# Patient Record
Sex: FEMALE | Race: WHITE | NOT HISPANIC OR LATINO | Employment: OTHER | URBAN - METROPOLITAN AREA
[De-identification: names, ages, dates, MRNs, and addresses within clinical notes are randomized per-mention and may not be internally consistent; named-entity substitution may affect disease eponyms.]

---

## 2017-01-03 ENCOUNTER — PATIENT MESSAGE (OUTPATIENT)
Dept: RHEUMATOLOGY | Facility: CLINIC | Age: 47
End: 2017-01-03

## 2017-01-09 ENCOUNTER — PATIENT MESSAGE (OUTPATIENT)
Dept: RHEUMATOLOGY | Facility: CLINIC | Age: 47
End: 2017-01-09

## 2017-02-21 ENCOUNTER — PATIENT MESSAGE (OUTPATIENT)
Dept: RHEUMATOLOGY | Facility: CLINIC | Age: 47
End: 2017-02-21

## 2017-02-21 DIAGNOSIS — M06.021 RHEUMATOID ARTHRITIS INVOLVING BOTH ELBOWS WITH NEGATIVE RHEUMATOID FACTOR: ICD-10-CM

## 2017-02-21 DIAGNOSIS — M47.817 LUMBAR AND SACRAL OSTEOARTHRITIS: ICD-10-CM

## 2017-02-21 DIAGNOSIS — M06.022 RHEUMATOID ARTHRITIS INVOLVING BOTH ELBOWS WITH NEGATIVE RHEUMATOID FACTOR: ICD-10-CM

## 2017-02-28 RX ORDER — HYDROCODONE BITARTRATE AND ACETAMINOPHEN 7.5; 325 MG/1; MG/1
1 TABLET ORAL EVERY 8 HOURS PRN
Qty: 90 TABLET | Refills: 0 | Status: SHIPPED | OUTPATIENT
Start: 2017-02-28 | End: 2017-03-03 | Stop reason: SDUPTHER

## 2017-03-01 NOTE — TELEPHONE ENCOUNTER
. I have  check [place] prescription monitoring program site and no unusual or abnormal behavior has occured

## 2017-03-03 ENCOUNTER — OFFICE VISIT (OUTPATIENT)
Dept: RHEUMATOLOGY | Facility: CLINIC | Age: 47
End: 2017-03-03
Payer: COMMERCIAL

## 2017-03-03 ENCOUNTER — PATIENT MESSAGE (OUTPATIENT)
Dept: RHEUMATOLOGY | Facility: CLINIC | Age: 47
End: 2017-03-03

## 2017-03-03 VITALS
BODY MASS INDEX: 27.04 KG/M2 | DIASTOLIC BLOOD PRESSURE: 87 MMHG | WEIGHT: 138.44 LBS | HEART RATE: 74 BPM | SYSTOLIC BLOOD PRESSURE: 128 MMHG

## 2017-03-03 DIAGNOSIS — M06.022 RHEUMATOID ARTHRITIS INVOLVING BOTH ELBOWS WITH NEGATIVE RHEUMATOID FACTOR: Primary | ICD-10-CM

## 2017-03-03 DIAGNOSIS — M47.817 LUMBAR AND SACRAL OSTEOARTHRITIS: ICD-10-CM

## 2017-03-03 DIAGNOSIS — L40.50 PSORIATIC ARTHRITIS: ICD-10-CM

## 2017-03-03 DIAGNOSIS — L40.8 PSORIASIS WITH PUSTULES: ICD-10-CM

## 2017-03-03 DIAGNOSIS — N20.0 KIDNEY STONES: ICD-10-CM

## 2017-03-03 DIAGNOSIS — M06.021 RHEUMATOID ARTHRITIS INVOLVING BOTH ELBOWS WITH NEGATIVE RHEUMATOID FACTOR: Primary | ICD-10-CM

## 2017-03-03 PROCEDURE — 99999 PR PBB SHADOW E&M-EST. PATIENT-LVL III: CPT | Mod: PBBFAC,,, | Performed by: INTERNAL MEDICINE

## 2017-03-03 PROCEDURE — 1160F RVW MEDS BY RX/DR IN RCRD: CPT | Mod: S$GLB,,, | Performed by: INTERNAL MEDICINE

## 2017-03-03 PROCEDURE — 99214 OFFICE O/P EST MOD 30 MIN: CPT | Mod: 25,S$GLB,, | Performed by: INTERNAL MEDICINE

## 2017-03-03 PROCEDURE — 96372 THER/PROPH/DIAG INJ SC/IM: CPT | Mod: S$GLB,,, | Performed by: INTERNAL MEDICINE

## 2017-03-03 RX ORDER — HYDROCODONE BITARTRATE AND ACETAMINOPHEN 7.5; 325 MG/1; MG/1
1 TABLET ORAL EVERY 8 HOURS PRN
Qty: 90 TABLET | Refills: 0 | Status: SHIPPED | OUTPATIENT
Start: 2017-03-24 | End: 2017-04-26 | Stop reason: SDUPTHER

## 2017-03-03 RX ORDER — METHYLPREDNISOLONE ACETATE 80 MG/ML
160 INJECTION, SUSPENSION INTRA-ARTICULAR; INTRALESIONAL; INTRAMUSCULAR; SOFT TISSUE
Status: COMPLETED | OUTPATIENT
Start: 2017-03-03 | End: 2017-03-03

## 2017-03-03 RX ORDER — DEXMETHYLPHENIDATE HYDROCHLORIDE 5 MG/1
TABLET ORAL
Refills: 0 | COMMUNITY
Start: 2017-01-18 | End: 2017-07-14

## 2017-03-03 RX ORDER — TRIAMCINOLONE ACETONIDE 0.25 MG/G
CREAM TOPICAL 2 TIMES DAILY
Qty: 80 G | Refills: 5 | Status: SHIPPED | OUTPATIENT
Start: 2017-03-03 | End: 2017-11-06 | Stop reason: SDUPTHER

## 2017-03-03 RX ORDER — LINACLOTIDE 145 UG/1
CAPSULE, GELATIN COATED ORAL
Refills: 11 | COMMUNITY
Start: 2016-12-29 | End: 2018-11-01

## 2017-03-03 RX ORDER — FLUOXETINE HYDROCHLORIDE 20 MG/1
CAPSULE ORAL
Refills: 2 | COMMUNITY
Start: 2017-02-02 | End: 2017-07-14

## 2017-03-03 RX ORDER — CYANOCOBALAMIN 1000 UG/ML
1000 INJECTION, SOLUTION INTRAMUSCULAR; SUBCUTANEOUS
Status: COMPLETED | OUTPATIENT
Start: 2017-03-03 | End: 2017-03-03

## 2017-03-03 RX ORDER — POTASSIUM CITRATE 10 MEQ/1
20 TABLET, EXTENDED RELEASE ORAL 2 TIMES DAILY WITH MEALS
Qty: 120 TABLET | Refills: 5 | Status: SHIPPED | OUTPATIENT
Start: 2017-03-03 | End: 2018-11-01

## 2017-03-03 RX ORDER — KETOROLAC TROMETHAMINE 30 MG/ML
60 INJECTION, SOLUTION INTRAMUSCULAR; INTRAVENOUS
Status: COMPLETED | OUTPATIENT
Start: 2017-03-03 | End: 2017-03-03

## 2017-03-03 RX ADMIN — CYANOCOBALAMIN 1000 MCG: 1000 INJECTION, SOLUTION INTRAMUSCULAR; SUBCUTANEOUS at 05:03

## 2017-03-03 RX ADMIN — METHYLPREDNISOLONE ACETATE 160 MG: 80 INJECTION, SUSPENSION INTRA-ARTICULAR; INTRALESIONAL; INTRAMUSCULAR; SOFT TISSUE at 05:03

## 2017-03-03 RX ADMIN — KETOROLAC TROMETHAMINE 60 MG: 30 INJECTION, SOLUTION INTRAMUSCULAR; INTRAVENOUS at 05:03

## 2017-03-03 ASSESSMENT — ROUTINE ASSESSMENT OF PATIENT INDEX DATA (RAPID3)
AM STIFFNESS SCORE: 1, YES
FATIGUE SCORE: 5
PSYCHOLOGICAL DISTRESS SCORE: 4.4
WHEN YOU AWAKENED IN THE MORNING OVER THE LAST WEEK, PLEASE INDICATE THE AMOUNT OF TIME IT TAKES UNTIL YOU ARE AS LIMBER AS YOU WILL BE FOR THE DAY: ONE HOUR AFTER WAKING
PATIENT GLOBAL ASSESSMENT SCORE: 5
MDHAQ FUNCTION SCORE: 1.5
TOTAL RAPID3 SCORE: 5.5
PAIN SCORE: 6.5

## 2017-03-03 NOTE — PROGRESS NOTES
Subjective:       Patient ID: Osiris Billingsley is a 46 y.o. female.    Chief Complaint: Follow-up    HPI Comments: Follow up: psa and ra, newly dx uveitis.Patient complains of arthralgias and myalgias for which has been present for a few years. Pain is located in multiple joints, both shoulder(s), both elbow(s), both wrist(s), both MCP(s): 1st, 2nd, 3rd, 4th and 5th, both PIP(s): 1st, 2nd, 3rd, 4th and 5th, both DIP(s): 1st and 2nd, both hip(s), both knee(s) and both MTP(s): 1st, 2nd, 3rd, 4th and 5th, is described as aching, pulsating, shooting and throbbing, and is constant, moderate .  Associated symptoms include: crepitation, decreased range of motion, edema, effusion, tenderness and warmth.  Uveitis flare              She complains of lasting between 1 and 2 hours after awakening.    Past treatments include methotrexate (plaquenil, azulfidine, humira).           She complains of lasting between 1 and 2 hours after awakening.              She complains of lasting between 1 and 2 hours after awakening.        Review of Systems   Constitutional: Positive for activity change. Negative for appetite change, chills, diaphoresis and unexpected weight change.   HENT: Negative for congestion, dental problem, ear discharge, ear pain, facial swelling, mouth sores, nosebleeds, postnasal drip, rhinorrhea, sinus pressure, sneezing, sore throat, tinnitus and voice change.    Eyes: Positive for pain and redness. Negative for photophobia, discharge and itching.   Respiratory: Negative for apnea, cough, chest tightness, shortness of breath and wheezing.    Cardiovascular: Positive for leg swelling. Negative for chest pain and palpitations.   Gastrointestinal: Negative for abdominal distention, abdominal pain, constipation, diarrhea, nausea and vomiting.   Endocrine: Negative for cold intolerance, heat intolerance, polydipsia and polyuria.   Genitourinary: Negative for decreased urine volume, difficulty urinating, flank pain,  frequency, hematuria and urgency.   Musculoskeletal: Positive for arthralgias, back pain, gait problem, neck pain and neck stiffness.   Skin: Negative for pallor, rash and wound.   Allergic/Immunologic: Negative for immunocompromised state.   Neurological: Positive for weakness. Negative for dizziness, tremors and numbness.   Hematological: Negative for adenopathy. Does not bruise/bleed easily.   Psychiatric/Behavioral: Negative for sleep disturbance. The patient is not nervous/anxious.          Objective:     /87 (BP Location: Left arm, Patient Position: Sitting, BP Method: Automatic)  Pulse 74  Wt 62.8 kg (138 lb 7.2 oz)  BMI 27.04 kg/m2     Physical Exam   Vitals reviewed.  Constitutional: She is oriented to person, place, and time and well-developed, well-nourished, and in no distress.   HENT:   Head: Normocephalic and atraumatic.   Mouth/Throat: Oropharynx is clear and moist.   Eyes: EOM are normal. Pupils are equal, round, and reactive to light.   Neck: Neck supple. No thyromegaly present.   Cardiovascular: Normal rate, regular rhythm and normal heart sounds.  Exam reveals no gallop and no friction rub.    No murmur heard.  Pulmonary/Chest: Breath sounds normal. She has no wheezes. She has no rales. She exhibits no tenderness.   Abdominal: There is no tenderness. There is no rebound and no guarding.       Right Side Rheumatological Exam     Examination finds the elbow, 1st MCP, 2nd MCP, 3rd MCP, 4th MCP and 5th MCP normal.    The patient is tender to palpation of the shoulder, wrist, knee, 1st PIP, 1st MCP, 2nd PIP, 2nd MCP, 3rd PIP, 3rd MCP, 4th PIP, 4th MCP, 5th PIP and 5th MCP    She has swelling of the knee    The patient has an enlarged wrist, 1st PIP, 2nd PIP, 3rd PIP, 4th PIP and 5th PIP    Shoulder Exam   Tenderness Location: acromion    Range of Motion   Active Abduction: abnormal   Adduction: abnormal  Effusion: negative  Sensation: normal    Knee Exam   Tenderness Location: medial joint  line  Patellofemoral Crepitus: positive  Effusion: positive  Range of Motion: decreased range of motion  Sensation: normal    Hip Exam   Tenderness Location: no tenderness  Sensation: normal    Elbow/Wrist Exam   Tenderness Location: no tenderness  Sensation: normal    Left Side Rheumatological Exam     Examination finds the elbow, knee, 1st MCP, 2nd MCP, 3rd MCP, 4th MCP and 5th MCP normal.    The patient is tender to palpation of the shoulder, elbow, wrist, knee, 1st PIP, 1st MCP, 2nd PIP, 2nd MCP, 3rd PIP, 3rd MCP, 4th PIP, 4th MCP, 5th PIP and 5th MCP.    The patient has an enlarged wrist, 1st PIP, 2nd PIP, 3rd PIP, 4th PIP and 5th PIP.    Shoulder Exam   Tenderness Location: acromion    Range of Motion   Active Abduction: abnormal   Sensation: normal    Knee Exam   Tenderness Location: lateral joint line and medial joint line    Patellofemoral Crepitus: positive  Effusion: positive  Range of Motion: decreased range of motion  Sensation: normal    Hip Exam   Tenderness Location: no tenderness  Sensation: normal    Elbow/Wrist Exam   Sensation: normal      Back/Neck Exam   General Inspection   Gait: normal       Tenderness Right paramedian tenderness of the Lower C-Spine and Lower L-Spine.Left paramedian tenderness of the Upper C-Spine and Lower L-Spine.      Lymphadenopathy:     She has no cervical adenopathy.   Neurological: She is alert and oriented to person, place, and time. Gait normal.   Reflex Scores:       Patellar reflexes are 3+ on the right side and 3+ on the left side.  Skin: Rash noted. No erythema. There is pallor.     Psoriasis B knee   Psychiatric: Mood, memory, affect and judgment normal.   Musculoskeletal: She exhibits edema, tenderness and deformity.           Results for orders placed or performed in visit on 04/05/16   LUTEINIZING HORMONE   Result Value Ref Range    LH 37.4 See Text mIU/mL   FOLLICLE STIMULATING HORMONE   Result Value Ref Range    FSH 87.10 See Text mIU/mL   Estrogens, total    Result Value Ref Range    Estrogen 41 pg/mL   PROGESTERONE   Result Value Ref Range    Progesterone <0.1 See Text ng/mL   Protein electrophoresis, serum   Result Value Ref Range    Protein, Serum 6.8 6.0 - 8.4 g/dL    Albumin grams/dl 4.28 3.35 - 5.55 g/dL    Alpha-1 grams/dl 0.22 0.17 - 0.41 g/dL    Alpha-2 grams/dl 0.58 0.43 - 0.99 g/dL    Beta grams/dl 0.65 0.50 - 1.10 g/dL    Gamma grams/dl 1.07 0.67 - 1.58 g/dL   PTH, intact   Result Value Ref Range    PTH, Intact 75.0 9.0 - 77.0 pg/mL   CBC W/ AUTO DIFFERENTIAL   Result Value Ref Range    WBC 6.22 3.90 - 12.70 K/uL    RBC 4.37 4.00 - 5.40 M/uL    Hemoglobin 12.9 12.0 - 16.0 g/dL    Hematocrit 38.5 37.0 - 48.5 %    MCV 88 82 - 98 fL    MCH 29.5 27.0 - 31.0 pg    MCHC 33.5 32.0 - 36.0 %    RDW 12.8 11.5 - 14.5 %    Platelets 230 150 - 350 K/uL    MPV 11.6 9.2 - 12.9 fL    Gran # 2.5 1.8 - 7.7 K/uL    Lymph # 3.2 1.0 - 4.8 K/uL    Mono # 0.3 0.3 - 1.0 K/uL    Eos # 0.2 0.0 - 0.5 K/uL    Baso # 0.02 0.00 - 0.20 K/uL    Gran% 39.9 38.0 - 73.0 %    Lymph% 51.9 (H) 18.0 - 48.0 %    Mono% 5.5 4.0 - 15.0 %    Eosinophil% 2.4 0.0 - 8.0 %    Basophil% 0.3 0.0 - 1.9 %    Differential Method Automated    COMPREHENSIVE METABOLIC PANEL   Result Value Ref Range    Sodium 139 136 - 145 mmol/L    Potassium 4.0 3.5 - 5.1 mmol/L    Chloride 104 95 - 110 mmol/L    CO2 25 23 - 29 mmol/L    Glucose 93 70 - 110 mg/dL    BUN, Bld 15 6 - 20 mg/dL    Creatinine 0.9 0.5 - 1.4 mg/dL    Calcium 9.3 8.7 - 10.5 mg/dL    Total Protein 7.0 6.0 - 8.4 g/dL    Albumin 4.0 3.5 - 5.2 g/dL    Total Bilirubin 0.9 0.1 - 1.0 mg/dL    Alkaline Phosphatase 69 55 - 135 U/L    AST 19 10 - 40 U/L    ALT 19 10 - 44 U/L    Anion Gap 10 8 - 16 mmol/L    eGFR if African American >60.0 >60 mL/min/1.73 m^2    eGFR if non African American >60.0 >60 mL/min/1.73 m^2   SEDIMENTATION RATE, MANUAL   Result Value Ref Range    Sed Rate 8 0 - 20 mm/Hr   C-REACTIVE PROTEIN   Result Value Ref Range    CRP 0.5 0.0 - 8.2  mg/L   RHEUMATOID FACTOR   Result Value Ref Range    Rheumatoid Factor <10.0 0.0 - 15.0 IU/mL   CYCLIC CITRUL PEPTIDE ANTIBODY, IGG   Result Value Ref Range    CCP Antibodies <0.5 <5.0 U/mL   Pathologist Interpretation SPE   Result Value Ref Range    Pathologist Interpretation SPE REVIEWED        Assessment:       Encounter Diagnoses   Name Primary?    Rheumatoid arthritis involving both elbows with negative rheumatoid factor Yes    Psoriatic arthritis     Kidney stones     Psoriasis with pustules     Lumbar and sacral osteoarthritis            Plan:       Rheumatoid arthritis involving both elbows with negative rheumatoid factor  -     triamcinolone acetonide 0.025% (KENALOG) 0.025 % cream; Apply topically 2 (two) times daily.  Dispense: 80 g; Refill: 5  -     CBC auto differential; Future; Expected date: 3/3/17  -     Comprehensive metabolic panel; Future; Expected date: 3/3/17  -     TSH; Future; Expected date: 3/3/17  -     T4, free; Future; Expected date: 3/3/17  -     T3, free; Future; Expected date: 3/3/17  -     C-reactive protein; Future; Expected date: 3/3/17  -     Sedimentation rate, manual; Future; Expected date: 3/3/17  -     hydrocodone-acetaminophen 7.5-325mg (NORCO) 7.5-325 mg per tablet; Take 1 tablet by mouth every 8 (eight) hours as needed for Pain.  Dispense: 90 tablet; Refill: 0    Psoriatic arthritis  -     triamcinolone acetonide 0.025% (KENALOG) 0.025 % cream; Apply topically 2 (two) times daily.  Dispense: 80 g; Refill: 5  -     CBC auto differential; Future; Expected date: 3/3/17  -     Comprehensive metabolic panel; Future; Expected date: 3/3/17  -     TSH; Future; Expected date: 3/3/17  -     T4, free; Future; Expected date: 3/3/17  -     T3, free; Future; Expected date: 3/3/17  -     C-reactive protein; Future; Expected date: 3/3/17  -     Sedimentation rate, manual; Future; Expected date: 3/3/17  -     hydrocodone-acetaminophen 7.5-325mg (NORCO) 7.5-325 mg per tablet; Take 1  tablet by mouth every 8 (eight) hours as needed for Pain.  Dispense: 90 tablet; Refill: 0    Kidney stones  -     triamcinolone acetonide 0.025% (KENALOG) 0.025 % cream; Apply topically 2 (two) times daily.  Dispense: 80 g; Refill: 5  -     CBC auto differential; Future; Expected date: 3/3/17  -     Comprehensive metabolic panel; Future; Expected date: 3/3/17  -     TSH; Future; Expected date: 3/3/17  -     T4, free; Future; Expected date: 3/3/17  -     T3, free; Future; Expected date: 3/3/17  -     C-reactive protein; Future; Expected date: 3/3/17  -     Sedimentation rate, manual; Future; Expected date: 3/3/17  -     hydrocodone-acetaminophen 7.5-325mg (NORCO) 7.5-325 mg per tablet; Take 1 tablet by mouth every 8 (eight) hours as needed for Pain.  Dispense: 90 tablet; Refill: 0    Psoriasis with pustules  -     triamcinolone acetonide 0.025% (KENALOG) 0.025 % cream; Apply topically 2 (two) times daily.  Dispense: 80 g; Refill: 5  -     CBC auto differential; Future; Expected date: 3/3/17  -     Comprehensive metabolic panel; Future; Expected date: 3/3/17  -     TSH; Future; Expected date: 3/3/17  -     T4, free; Future; Expected date: 3/3/17  -     T3, free; Future; Expected date: 3/3/17  -     C-reactive protein; Future; Expected date: 3/3/17  -     Sedimentation rate, manual; Future; Expected date: 3/3/17  -     hydrocodone-acetaminophen 7.5-325mg (NORCO) 7.5-325 mg per tablet; Take 1 tablet by mouth every 8 (eight) hours as needed for Pain.  Dispense: 90 tablet; Refill: 0    Lumbar and sacral osteoarthritis  -     CBC auto differential; Future; Expected date: 3/3/17  -     Comprehensive metabolic panel; Future; Expected date: 3/3/17  -     TSH; Future; Expected date: 3/3/17  -     T4, free; Future; Expected date: 3/3/17  -     T3, free; Future; Expected date: 3/3/17  -     C-reactive protein; Future; Expected date: 3/3/17  -     Sedimentation rate, manual; Future; Expected date: 3/3/17  -      hydrocodone-acetaminophen 7.5-325mg (NORCO) 7.5-325 mg per tablet; Take 1 tablet by mouth every 8 (eight) hours as needed for Pain.  Dispense: 90 tablet; Refill: 0    Other orders  -     potassium citrate (UROCIT-K) 10 mEq (1,080 mg) TbSR; Take 2 tablets (20 mEq total) by mouth 2 (two) times daily with meals.  Dispense: 120 tablet; Refill: 5

## 2017-03-03 NOTE — PROGRESS NOTES
Administered 1 cc ( 1000 mcg/ml ) of b 12 to the right upper outer gluteal. Informed of s/s to report verbalized understanding. No adverse reactions noted.    Lot # [id number]  Expiration jul 18    Administered 2 cc ( 80 mg/ml ) of depomedrol to the right upper outer gluteal. Informed of s/s to report verbalized understanding. No adverse reactions noted.    Lot # [id number]  Expiration 01/2018    Administered 2 cc ( 30 mg/ml ) of toradol to the left upper outer gluteal. Informed of s/s to report verbalized understanding. No adverse reactions noted.    Lot # [id number]  Expiration 1 sep 2017

## 2017-03-07 ENCOUNTER — HOSPITAL ENCOUNTER (OUTPATIENT)
Dept: RADIOLOGY | Facility: CLINIC | Age: 47
Discharge: HOME | End: 2017-03-07
Attending: INTERNAL MEDICINE
Payer: COMMERCIAL

## 2017-03-07 DIAGNOSIS — M06.021 RHEUMATOID ARTHRITIS INVOLVING BOTH ELBOWS WITH NEGATIVE RHEUMATOID FACTOR: ICD-10-CM

## 2017-03-07 DIAGNOSIS — M06.022 RHEUMATOID ARTHRITIS INVOLVING BOTH ELBOWS WITH NEGATIVE RHEUMATOID FACTOR: ICD-10-CM

## 2017-03-07 DIAGNOSIS — N20.0 KIDNEY STONES: ICD-10-CM

## 2017-03-07 DIAGNOSIS — L40.50 PSORIATIC ARTHRITIS: ICD-10-CM

## 2017-03-07 DIAGNOSIS — M47.817 LUMBAR AND SACRAL OSTEOARTHRITIS: ICD-10-CM

## 2017-03-07 DIAGNOSIS — L40.8 PSORIASIS WITH PUSTULES: ICD-10-CM

## 2017-03-07 PROCEDURE — 77080 DXA BONE DENSITY AXIAL: CPT | Mod: TC,PO

## 2017-03-07 PROCEDURE — 77080 DXA BONE DENSITY AXIAL: CPT | Mod: 26,,, | Performed by: RADIOLOGY

## 2017-04-25 ENCOUNTER — PATIENT MESSAGE (OUTPATIENT)
Dept: RHEUMATOLOGY | Facility: CLINIC | Age: 47
End: 2017-04-25

## 2017-04-25 DIAGNOSIS — L40.50 PSORIATIC ARTHRITIS: ICD-10-CM

## 2017-04-25 DIAGNOSIS — N20.0 KIDNEY STONES: ICD-10-CM

## 2017-04-25 DIAGNOSIS — M47.817 LUMBAR AND SACRAL OSTEOARTHRITIS: ICD-10-CM

## 2017-04-25 DIAGNOSIS — M06.021 RHEUMATOID ARTHRITIS INVOLVING BOTH ELBOWS WITH NEGATIVE RHEUMATOID FACTOR: ICD-10-CM

## 2017-04-25 DIAGNOSIS — M06.022 RHEUMATOID ARTHRITIS INVOLVING BOTH ELBOWS WITH NEGATIVE RHEUMATOID FACTOR: ICD-10-CM

## 2017-04-25 DIAGNOSIS — L40.8 PSORIASIS WITH PUSTULES: ICD-10-CM

## 2017-04-27 RX ORDER — HYDROCODONE BITARTRATE AND ACETAMINOPHEN 7.5; 325 MG/1; MG/1
1 TABLET ORAL EVERY 8 HOURS PRN
Qty: 90 TABLET | Refills: 0 | Status: SHIPPED | OUTPATIENT
Start: 2017-04-27 | End: 2017-06-08 | Stop reason: SDUPTHER

## 2017-05-09 ENCOUNTER — PATIENT MESSAGE (OUTPATIENT)
Dept: RHEUMATOLOGY | Facility: CLINIC | Age: 47
End: 2017-05-09

## 2017-06-08 DIAGNOSIS — L40.8 PSORIASIS WITH PUSTULES: ICD-10-CM

## 2017-06-08 DIAGNOSIS — M06.021 RHEUMATOID ARTHRITIS INVOLVING BOTH ELBOWS WITH NEGATIVE RHEUMATOID FACTOR: ICD-10-CM

## 2017-06-08 DIAGNOSIS — M47.817 LUMBAR AND SACRAL OSTEOARTHRITIS: ICD-10-CM

## 2017-06-08 DIAGNOSIS — M06.022 RHEUMATOID ARTHRITIS INVOLVING BOTH ELBOWS WITH NEGATIVE RHEUMATOID FACTOR: ICD-10-CM

## 2017-06-08 DIAGNOSIS — N20.0 KIDNEY STONES: ICD-10-CM

## 2017-06-08 DIAGNOSIS — L40.50 PSORIATIC ARTHRITIS: ICD-10-CM

## 2017-06-09 RX ORDER — HYDROCODONE BITARTRATE AND ACETAMINOPHEN 7.5; 325 MG/1; MG/1
1 TABLET ORAL EVERY 8 HOURS PRN
Qty: 90 TABLET | Refills: 0 | Status: SHIPPED | OUTPATIENT
Start: 2017-06-09 | End: 2017-07-14 | Stop reason: SDUPTHER

## 2017-06-11 DIAGNOSIS — M06.9 RHEUMATOID ARTHRITIS: ICD-10-CM

## 2017-06-11 DIAGNOSIS — M47.817 LUMBAR AND SACRAL OSTEOARTHRITIS: ICD-10-CM

## 2017-06-13 RX ORDER — TRAZODONE HYDROCHLORIDE 100 MG/1
TABLET ORAL
Qty: 30 TABLET | Refills: 3 | Status: SHIPPED | OUTPATIENT
Start: 2017-06-13 | End: 2017-10-22 | Stop reason: SDUPTHER

## 2017-07-06 ENCOUNTER — PATIENT MESSAGE (OUTPATIENT)
Dept: RHEUMATOLOGY | Facility: CLINIC | Age: 47
End: 2017-07-06

## 2017-07-10 ENCOUNTER — PATIENT MESSAGE (OUTPATIENT)
Dept: RHEUMATOLOGY | Facility: CLINIC | Age: 47
End: 2017-07-10

## 2017-07-14 ENCOUNTER — OFFICE VISIT (OUTPATIENT)
Dept: RHEUMATOLOGY | Facility: CLINIC | Age: 47
End: 2017-07-14
Payer: COMMERCIAL

## 2017-07-14 ENCOUNTER — LAB VISIT (OUTPATIENT)
Dept: LAB | Facility: HOSPITAL | Age: 47
End: 2017-07-14
Attending: INTERNAL MEDICINE
Payer: COMMERCIAL

## 2017-07-14 VITALS
DIASTOLIC BLOOD PRESSURE: 80 MMHG | WEIGHT: 140.31 LBS | HEART RATE: 90 BPM | BODY MASS INDEX: 27.4 KG/M2 | SYSTOLIC BLOOD PRESSURE: 135 MMHG

## 2017-07-14 DIAGNOSIS — M47.817 LUMBAR AND SACRAL OSTEOARTHRITIS: ICD-10-CM

## 2017-07-14 DIAGNOSIS — L40.8 PSORIASIS WITH PUSTULES: ICD-10-CM

## 2017-07-14 DIAGNOSIS — F32.A DEPRESSION, UNSPECIFIED DEPRESSION TYPE: ICD-10-CM

## 2017-07-14 DIAGNOSIS — M47.817 LUMBAR AND SACRAL OSTEOARTHRITIS: Primary | ICD-10-CM

## 2017-07-14 DIAGNOSIS — N20.0 KIDNEY STONES: ICD-10-CM

## 2017-07-14 DIAGNOSIS — M79.7 FIBROMYALGIA: ICD-10-CM

## 2017-07-14 DIAGNOSIS — L40.50 PSORIATIC ARTHRITIS: ICD-10-CM

## 2017-07-14 LAB
ALBUMIN SERPL BCP-MCNC: 3.9 G/DL
ALP SERPL-CCNC: 63 U/L
ALT SERPL W/O P-5'-P-CCNC: 40 U/L
ANION GAP SERPL CALC-SCNC: 13 MMOL/L
AST SERPL-CCNC: 30 U/L
BASOPHILS # BLD AUTO: 0.02 K/UL
BASOPHILS NFR BLD: 0.3 %
BILIRUB SERPL-MCNC: 0.5 MG/DL
BUN SERPL-MCNC: 16 MG/DL
CALCIUM SERPL-MCNC: 9.5 MG/DL
CHLORIDE SERPL-SCNC: 103 MMOL/L
CO2 SERPL-SCNC: 25 MMOL/L
CREAT SERPL-MCNC: 0.9 MG/DL
CRP SERPL-MCNC: 1.2 MG/L
DIFFERENTIAL METHOD: NORMAL
EOSINOPHIL # BLD AUTO: 0.1 K/UL
EOSINOPHIL NFR BLD: 1.5 %
ERYTHROCYTE [DISTWIDTH] IN BLOOD BY AUTOMATED COUNT: 12.9 %
ERYTHROCYTE [SEDIMENTATION RATE] IN BLOOD BY WESTERGREN METHOD: 16 MM/HR
EST. GFR  (AFRICAN AMERICAN): >60 ML/MIN/1.73 M^2
EST. GFR  (NON AFRICAN AMERICAN): >60 ML/MIN/1.73 M^2
GLUCOSE SERPL-MCNC: 96 MG/DL
HCT VFR BLD AUTO: 38.4 %
HGB BLD-MCNC: 12.7 G/DL
LYMPHOCYTES # BLD AUTO: 3 K/UL
LYMPHOCYTES NFR BLD: 41.5 %
MCH RBC QN AUTO: 30 PG
MCHC RBC AUTO-ENTMCNC: 33.1 %
MCV RBC AUTO: 91 FL
MONOCYTES # BLD AUTO: 0.5 K/UL
MONOCYTES NFR BLD: 6.8 %
NEUTROPHILS # BLD AUTO: 3.6 K/UL
NEUTROPHILS NFR BLD: 49.6 %
PLATELET # BLD AUTO: 279 K/UL
PMV BLD AUTO: 11 FL
POTASSIUM SERPL-SCNC: 3.7 MMOL/L
PROT SERPL-MCNC: 7.6 G/DL
RBC # BLD AUTO: 4.23 M/UL
SODIUM SERPL-SCNC: 141 MMOL/L
T3FREE SERPL-MCNC: 2.8 PG/ML
T4 FREE SERPL-MCNC: 0.9 NG/DL
TSH SERPL DL<=0.005 MIU/L-ACNC: 0.91 UIU/ML
WBC # BLD AUTO: 7.25 K/UL

## 2017-07-14 PROCEDURE — 86617 LYME DISEASE ANTIBODY: CPT | Mod: 91

## 2017-07-14 PROCEDURE — 86235 NUCLEAR ANTIGEN ANTIBODY: CPT | Mod: 59

## 2017-07-14 PROCEDURE — 86140 C-REACTIVE PROTEIN: CPT

## 2017-07-14 PROCEDURE — 86376 MICROSOMAL ANTIBODY EACH: CPT

## 2017-07-14 PROCEDURE — 85025 COMPLETE CBC W/AUTO DIFF WBC: CPT

## 2017-07-14 PROCEDURE — 86038 ANTINUCLEAR ANTIBODIES: CPT

## 2017-07-14 PROCEDURE — 96372 THER/PROPH/DIAG INJ SC/IM: CPT | Mod: S$GLB,,, | Performed by: INTERNAL MEDICINE

## 2017-07-14 PROCEDURE — 84443 ASSAY THYROID STIM HORMONE: CPT

## 2017-07-14 PROCEDURE — 86225 DNA ANTIBODY NATIVE: CPT

## 2017-07-14 PROCEDURE — 80053 COMPREHEN METABOLIC PANEL: CPT

## 2017-07-14 PROCEDURE — 86039 ANTINUCLEAR ANTIBODIES (ANA): CPT

## 2017-07-14 PROCEDURE — 84439 ASSAY OF FREE THYROXINE: CPT

## 2017-07-14 PROCEDURE — 86235 NUCLEAR ANTIGEN ANTIBODY: CPT

## 2017-07-14 PROCEDURE — 84481 FREE ASSAY (FT-3): CPT

## 2017-07-14 PROCEDURE — 99999 PR PBB SHADOW E&M-EST. PATIENT-LVL III: CPT | Mod: PBBFAC,,, | Performed by: INTERNAL MEDICINE

## 2017-07-14 PROCEDURE — 85651 RBC SED RATE NONAUTOMATED: CPT

## 2017-07-14 PROCEDURE — 99215 OFFICE O/P EST HI 40 MIN: CPT | Mod: 25,S$GLB,, | Performed by: INTERNAL MEDICINE

## 2017-07-14 RX ORDER — VALACYCLOVIR HYDROCHLORIDE 1 G/1
TABLET, FILM COATED ORAL
Refills: 2 | COMMUNITY
Start: 2017-04-10 | End: 2018-11-01

## 2017-07-14 RX ORDER — CLONAZEPAM 0.5 MG/1
0.5 TABLET ORAL 2 TIMES DAILY
Qty: 60 TABLET | Refills: 2 | Status: SHIPPED | OUTPATIENT
Start: 2017-07-14 | End: 2019-03-08

## 2017-07-14 RX ORDER — METHYLPREDNISOLONE ACETATE 80 MG/ML
160 INJECTION, SUSPENSION INTRA-ARTICULAR; INTRALESIONAL; INTRAMUSCULAR; SOFT TISSUE
Status: COMPLETED | OUTPATIENT
Start: 2017-07-14 | End: 2017-07-14

## 2017-07-14 RX ORDER — KETOROLAC TROMETHAMINE 30 MG/ML
60 INJECTION, SOLUTION INTRAMUSCULAR; INTRAVENOUS
Status: COMPLETED | OUTPATIENT
Start: 2017-07-14 | End: 2017-07-14

## 2017-07-14 RX ORDER — HYDROCODONE BITARTRATE AND ACETAMINOPHEN 7.5; 325 MG/1; MG/1
1 TABLET ORAL EVERY 8 HOURS PRN
Qty: 90 TABLET | Refills: 0 | Status: SHIPPED | OUTPATIENT
Start: 2017-07-14 | End: 2017-09-20 | Stop reason: SDUPTHER

## 2017-07-14 RX ORDER — CYANOCOBALAMIN 1000 UG/ML
1000 INJECTION, SOLUTION INTRAMUSCULAR; SUBCUTANEOUS
Status: COMPLETED | OUTPATIENT
Start: 2017-07-14 | End: 2017-07-14

## 2017-07-14 RX ADMIN — CYANOCOBALAMIN 1000 MCG: 1000 INJECTION, SOLUTION INTRAMUSCULAR; SUBCUTANEOUS at 05:07

## 2017-07-14 RX ADMIN — METHYLPREDNISOLONE ACETATE 160 MG: 80 INJECTION, SUSPENSION INTRA-ARTICULAR; INTRALESIONAL; INTRAMUSCULAR; SOFT TISSUE at 05:07

## 2017-07-14 RX ADMIN — KETOROLAC TROMETHAMINE 60 MG: 30 INJECTION, SOLUTION INTRAMUSCULAR; INTRAVENOUS at 05:07

## 2017-07-14 ASSESSMENT — ROUTINE ASSESSMENT OF PATIENT INDEX DATA (RAPID3)
AM STIFFNESS SCORE: 1, YES
MDHAQ FUNCTION SCORE: 1.6
PATIENT GLOBAL ASSESSMENT SCORE: 3
FATIGUE SCORE: 3
TOTAL RAPID3 SCORE: 5.78
WHEN YOU AWAKENED IN THE MORNING OVER THE LAST WEEK, PLEASE INDICATE THE AMOUNT OF TIME IT TAKES UNTIL YOU ARE AS LIMBER AS YOU WILL BE FOR THE DAY: ONE HOUR AFTER WAKING
PAIN SCORE: 9
PSYCHOLOGICAL DISTRESS SCORE: 3.3

## 2017-07-14 NOTE — PROGRESS NOTES
Subjective:       Patient ID: Osiris Billingsley is a 46 y.o. female.    Chief Complaint: Follow-up    Follow up: psa and ra, newly dx uveitis.Patient complains of arthralgias and myalgias for which has been present for a few years. Pain is located in multiple joints, both shoulder(s), both elbow(s), both wrist(s), both MCP(s): 1st, 2nd, 3rd, 4th and 5th, both PIP(s): 1st, 2nd, 3rd, 4th and 5th, both DIP(s): 1st and 2nd, both hip(s), both knee(s) and both MTP(s): 1st, 2nd, 3rd, 4th and 5th, is described as aching, pulsating, shooting and throbbing, and is constant, moderate .  Associated symptoms include: crepitation, decreased range of motion, edema, effusion, tenderness and warmth.  Uveitis flare        Review of Systems   Constitutional: Positive for activity change. Negative for appetite change, chills, diaphoresis and unexpected weight change.   HENT: Negative for congestion, dental problem, ear discharge, ear pain, facial swelling, mouth sores, nosebleeds, postnasal drip, rhinorrhea, sinus pressure, sneezing, sore throat, tinnitus and voice change.    Eyes: Positive for pain and redness. Negative for photophobia, discharge and itching.   Respiratory: Negative for apnea, cough, chest tightness, shortness of breath and wheezing.    Cardiovascular: Positive for leg swelling. Negative for chest pain and palpitations.   Gastrointestinal: Negative for abdominal distention, abdominal pain, constipation, diarrhea, nausea and vomiting.   Endocrine: Negative for cold intolerance, heat intolerance, polydipsia and polyuria.   Genitourinary: Negative for decreased urine volume, difficulty urinating, flank pain, frequency, hematuria and urgency.   Musculoskeletal: Positive for arthralgias, back pain, gait problem, neck pain and neck stiffness.   Skin: Negative for pallor, rash and wound.   Allergic/Immunologic: Negative for immunocompromised state.   Neurological: Positive for weakness. Negative for dizziness, tremors and  numbness.   Hematological: Negative for adenopathy. Does not bruise/bleed easily.   Psychiatric/Behavioral: Negative for sleep disturbance. The patient is not nervous/anxious.          Objective:     /80 (BP Location: Left arm, Patient Position: Sitting, BP Method: Automatic)   Pulse 90   Wt 63.6 kg (140 lb 4.8 oz)   BMI 27.40 kg/m²      Physical Exam   Vitals reviewed.  Constitutional: She is oriented to person, place, and time and well-developed, well-nourished, and in no distress.   HENT:   Head: Normocephalic and atraumatic.   Mouth/Throat: Oropharynx is clear and moist.   Eyes: EOM are normal. Pupils are equal, round, and reactive to light.   Neck: Neck supple. No thyromegaly present.   Cardiovascular: Normal rate, regular rhythm and normal heart sounds.  Exam reveals no gallop and no friction rub.    No murmur heard.  Pulmonary/Chest: Breath sounds normal. She has no wheezes. She has no rales. She exhibits no tenderness.   Abdominal: There is no tenderness. There is no rebound and no guarding.       Right Side Rheumatological Exam     Examination finds the elbow, 1st MCP, 2nd MCP, 3rd MCP, 4th MCP and 5th MCP normal.    The patient is tender to palpation of the shoulder, wrist, knee, 1st PIP, 1st MCP, 2nd PIP, 2nd MCP, 3rd PIP, 3rd MCP, 4th PIP, 4th MCP, 5th PIP and 5th MCP    She has swelling of the knee    The patient has an enlarged wrist, 1st PIP, 2nd PIP, 3rd PIP, 4th PIP and 5th PIP    Shoulder Exam   Tenderness Location: acromion    Range of Motion   Active Abduction: abnormal   Adduction: abnormal  Sensation: normal    Knee Exam   Tenderness Location: medial joint line  Patellofemoral Crepitus: positive  Effusion: positive  Sensation: normal    Hip Exam   Tenderness Location: no tenderness  Sensation: normal    Elbow/Wrist Exam   Tenderness Location: no tenderness  Sensation: normal    Left Side Rheumatological Exam     Examination finds the elbow, knee, 1st MCP, 2nd MCP, 3rd MCP, 4th MCP and  5th MCP normal.    The patient is tender to palpation of the shoulder, elbow, wrist, knee, 1st PIP, 1st MCP, 2nd PIP, 2nd MCP, 3rd PIP, 3rd MCP, 4th PIP, 4th MCP, 5th PIP and 5th MCP.    The patient has an enlarged wrist, 1st PIP, 2nd PIP, 3rd PIP, 4th PIP and 5th PIP.    Shoulder Exam   Tenderness Location: acromion    Range of Motion   Active Abduction: abnormal   Sensation: normal    Knee Exam   Tenderness Location: lateral joint line and medial joint line    Patellofemoral Crepitus: positive  Effusion: positive  Sensation: normal    Hip Exam   Tenderness Location: no tenderness  Sensation: normal    Elbow/Wrist Exam   Sensation: normal      Back/Neck Exam   General Inspection   Gait: normal       Tenderness Right paramedian tenderness of the Lower C-Spine and Lower L-Spine.Left paramedian tenderness of the Upper C-Spine and Lower L-Spine.      Lymphadenopathy:     She has no cervical adenopathy.   Neurological: She is alert and oriented to person, place, and time. Gait normal.   Reflex Scores:       Patellar reflexes are 3+ on the right side and 3+ on the left side.  Skin: Rash noted. No erythema. There is pallor.     Psoriasis B knee   Psychiatric: Mood, memory, affect and judgment normal.   Musculoskeletal: She exhibits edema, tenderness and deformity.           Results for orders placed or performed in visit on 03/07/17   Comprehensive metabolic panel   Result Value Ref Range    Sodium 140 136 - 145 mmol/L    Potassium 3.8 3.5 - 5.1 mmol/L    Chloride 103 95 - 110 mmol/L    CO2 29 23 - 29 mmol/L    Glucose 113 (H) 70 - 110 mg/dL    BUN, Bld 11 6 - 20 mg/dL    Creatinine 0.9 0.5 - 1.4 mg/dL    Calcium 9.3 8.7 - 10.5 mg/dL    Total Protein 7.4 6.0 - 8.4 g/dL    Albumin 3.9 3.5 - 5.2 g/dL    Total Bilirubin 0.7 0.1 - 1.0 mg/dL    Alkaline Phosphatase 61 55 - 135 U/L    AST 14 10 - 40 U/L    ALT 16 10 - 44 U/L    Anion Gap 8 8 - 16 mmol/L    eGFR if African American >60.0 >60 mL/min/1.73 m^2    eGFR if non  African American >60.0 >60 mL/min/1.73 m^2   TSH   Result Value Ref Range    TSH 1.478 0.400 - 4.000 uIU/mL   T4, free   Result Value Ref Range    Free T4 1.03 0.71 - 1.51 ng/dL   T3, free   Result Value Ref Range    T3, Free 2.6 2.3 - 4.2 pg/mL   C-reactive protein   Result Value Ref Range    CRP 0.7 0.0 - 8.2 mg/L   Sedimentation rate, manual   Result Value Ref Range    Sed Rate 13 0 - 20 mm/Hr   CBC auto differential   Result Value Ref Range    WBC 6.31 3.90 - 12.70 K/uL    RBC 4.12 4.00 - 5.40 M/uL    Hemoglobin 12.3 12.0 - 16.0 g/dL    Hematocrit 37.1 37.0 - 48.5 %    MCV 90 82 - 98 fL    MCH 29.9 27.0 - 31.0 pg    MCHC 33.2 32.0 - 36.0 %    RDW 12.9 11.5 - 14.5 %    Platelets 270 150 - 350 K/uL    MPV 11.1 9.2 - 12.9 fL    Gran # 3.0 1.8 - 7.7 K/uL    Lymph # 2.6 1.0 - 4.8 K/uL    Mono # 0.5 0.3 - 1.0 K/uL    Eos # 0.1 0.0 - 0.5 K/uL    Baso # 0.02 0.00 - 0.20 K/uL    Gran% 48.1 38.0 - 73.0 %    Lymph% 41.7 18.0 - 48.0 %    Mono% 8.4 4.0 - 15.0 %    Eosinophil% 1.3 0.0 - 8.0 %    Basophil% 0.3 0.0 - 1.9 %    Differential Method Automated        Assessment:       Encounter Diagnoses   Name Primary?    Lumbar and sacral osteoarthritis Yes    Fibromyalgia     Depression, unspecified depression type     Psoriatic arthritis     Kidney stones     Psoriasis with pustules            Plan:       Lumbar and sacral osteoarthritis  -     cyanocobalamin injection 1,000 mcg; Inject 1 mL (1,000 mcg total) into the muscle one time.  -     methylPREDNISolone acetate injection 160 mg; Inject 2 mLs (160 mg total) into the muscle one time.  -     ketorolac injection 60 mg; Inject 2 mLs (60 mg total) into the muscle one time.  -     Comprehensive metabolic panel; Future; Expected date: 07/14/2017  -     CBC auto differential; Future; Expected date: 07/14/2017  -     C-reactive protein; Future; Expected date: 07/14/2017  -     Sedimentation rate, manual; Future; Expected date: 07/14/2017  -     TSH; Future; Expected date:  07/14/2017  -     T4, free; Future; Expected date: 07/14/2017  -     T3, free; Future; Expected date: 07/14/2017  -     Thyroid peroxidase antibody; Future; Expected date: 07/14/2017  -     clonazePAM (KLONOPIN) 0.5 MG tablet; Take 1 tablet (0.5 mg total) by mouth 2 (two) times daily.  Dispense: 60 tablet; Refill: 2  -     LYME DISEASE WESTERN BLOT; Future; Expected date: 07/14/2017  -     hydrocodone-acetaminophen 7.5-325mg (NORCO) 7.5-325 mg per tablet; Take 1 tablet by mouth every 8 (eight) hours as needed for Pain.  Dispense: 90 tablet; Refill: 0  -     ARLIN; Future; Expected date: 07/14/2017  -     Sjogrens syndrome-A extractable nuclear antibody; Future; Expected date: 07/14/2017  -     Sjogrens syndrome-B extractable nuclear antibody; Future; Expected date: 07/14/2017    Fibromyalgia  -     cyanocobalamin injection 1,000 mcg; Inject 1 mL (1,000 mcg total) into the muscle one time.  -     methylPREDNISolone acetate injection 160 mg; Inject 2 mLs (160 mg total) into the muscle one time.  -     ketorolac injection 60 mg; Inject 2 mLs (60 mg total) into the muscle one time.  -     Comprehensive metabolic panel; Future; Expected date: 07/14/2017  -     CBC auto differential; Future; Expected date: 07/14/2017  -     C-reactive protein; Future; Expected date: 07/14/2017  -     Sedimentation rate, manual; Future; Expected date: 07/14/2017  -     TSH; Future; Expected date: 07/14/2017  -     T4, free; Future; Expected date: 07/14/2017  -     T3, free; Future; Expected date: 07/14/2017  -     Thyroid peroxidase antibody; Future; Expected date: 07/14/2017  -     clonazePAM (KLONOPIN) 0.5 MG tablet; Take 1 tablet (0.5 mg total) by mouth 2 (two) times daily.  Dispense: 60 tablet; Refill: 2  -     LYME DISEASE WESTERN BLOT; Future; Expected date: 07/14/2017  -     ARLIN; Future; Expected date: 07/14/2017  -     Sjogrens syndrome-A extractable nuclear antibody; Future; Expected date: 07/14/2017  -     Sjogrens syndrome-B  extractable nuclear antibody; Future; Expected date: 07/14/2017    Depression, unspecified depression type  -     cyanocobalamin injection 1,000 mcg; Inject 1 mL (1,000 mcg total) into the muscle one time.  -     methylPREDNISolone acetate injection 160 mg; Inject 2 mLs (160 mg total) into the muscle one time.  -     ketorolac injection 60 mg; Inject 2 mLs (60 mg total) into the muscle one time.  -     Comprehensive metabolic panel; Future; Expected date: 07/14/2017  -     CBC auto differential; Future; Expected date: 07/14/2017  -     C-reactive protein; Future; Expected date: 07/14/2017  -     Sedimentation rate, manual; Future; Expected date: 07/14/2017  -     TSH; Future; Expected date: 07/14/2017  -     T4, free; Future; Expected date: 07/14/2017  -     T3, free; Future; Expected date: 07/14/2017  -     Thyroid peroxidase antibody; Future; Expected date: 07/14/2017  -     clonazePAM (KLONOPIN) 0.5 MG tablet; Take 1 tablet (0.5 mg total) by mouth 2 (two) times daily.  Dispense: 60 tablet; Refill: 2  -     LYME DISEASE WESTERN BLOT; Future; Expected date: 07/14/2017  -     ARLIN; Future; Expected date: 07/14/2017  -     Sjogrens syndrome-A extractable nuclear antibody; Future; Expected date: 07/14/2017  -     Sjogrens syndrome-B extractable nuclear antibody; Future; Expected date: 07/14/2017    Psoriatic arthritis  -     LYME DISEASE WESTERN BLOT; Future; Expected date: 07/14/2017  -     hydrocodone-acetaminophen 7.5-325mg (NORCO) 7.5-325 mg per tablet; Take 1 tablet by mouth every 8 (eight) hours as needed for Pain.  Dispense: 90 tablet; Refill: 0  -     ARLIN; Future; Expected date: 07/14/2017  -     Sjogrens syndrome-A extractable nuclear antibody; Future; Expected date: 07/14/2017  -     Sjogrens syndrome-B extractable nuclear antibody; Future; Expected date: 07/14/2017    Kidney stones  -     hydrocodone-acetaminophen 7.5-325mg (NORCO) 7.5-325 mg per tablet; Take 1 tablet by mouth every 8 (eight) hours as needed  for Pain.  Dispense: 90 tablet; Refill: 0    Psoriasis with pustules  -     hydrocodone-acetaminophen 7.5-325mg (NORCO) 7.5-325 mg per tablet; Take 1 tablet by mouth every 8 (eight) hours as needed for Pain.  Dispense: 90 tablet; Refill: 0      Will DC Cimzia,  Restart Humira,, if no good will start Remicade, and if still depressed trinrellix

## 2017-07-14 NOTE — PROGRESS NOTES
Administered 1 cc ( 1000 mcg/ml ) of b12 to the right upper outer gluteal. Informed of s/s to report verbalized understanding. No adverse reactions noted.    Lot # [id number]  Expiration aug 18    Administered 2 cc ( 80 mg/ml ) of depomedrol to the right upper outer gluteal. Informed of s/s to report verbalized understanding. No adverse reactions noted.    Lot # [id number]  Expiration 06/2018    Administered 2 cc ( 30 mg/ml ) of toradol to the left upper outer gluteal. Informed of s/s to report verbalized understanding. No adverse reactions noted.    Lot # [id number]  Expiration 1 oct 2018

## 2017-07-15 LAB — THYROPEROXIDASE IGG SERPL-ACNC: <6 IU/ML

## 2017-07-17 LAB
ANA SER QL IF: POSITIVE
ANA TITR SER IF: NORMAL {TITER}

## 2017-07-18 LAB
ANTI SM ANTIBODY: 1.05 EU
ANTI SM/RNP ANTIBODY: 0.68 EU
ANTI-SM INTERPRETATION: NEGATIVE
ANTI-SM/RNP INTERPRETATION: NEGATIVE
ANTI-SSA ANTIBODY: 1.37 EU
ANTI-SSA ANTIBODY: 1.37 EU
ANTI-SSA INTERPRETATION: NEGATIVE
ANTI-SSA INTERPRETATION: NEGATIVE
ANTI-SSB ANTIBODY: 4.1 EU
ANTI-SSB ANTIBODY: 4.1 EU
ANTI-SSB INTERPRETATION: NEGATIVE
ANTI-SSB INTERPRETATION: NEGATIVE
B BURGDOR AB PATRN SER IB-IMP: NORMAL
B BURGDOR IGG PATRN SER IB-IMP: NORMAL KDA
B BURGDOR IGG SER QL IB: NEGATIVE
B BURGDOR IGM PATRN SER IB-IMP: NORMAL KDA
B BURGDOR IGM SER QL IB: NEGATIVE
DSDNA AB SER-ACNC: NORMAL [IU]/ML

## 2017-07-19 ENCOUNTER — PATIENT MESSAGE (OUTPATIENT)
Dept: RHEUMATOLOGY | Facility: CLINIC | Age: 47
End: 2017-07-19

## 2017-08-01 ENCOUNTER — TELEPHONE (OUTPATIENT)
Dept: RHEUMATOLOGY | Facility: CLINIC | Age: 47
End: 2017-08-01

## 2017-08-01 ENCOUNTER — PATIENT MESSAGE (OUTPATIENT)
Dept: RHEUMATOLOGY | Facility: CLINIC | Age: 47
End: 2017-08-01

## 2017-08-01 NOTE — TELEPHONE ENCOUNTER
----- Message from Brenda José sent at 8/1/2017 11:36 AM CDT -----  Contact: Osiris Billingsley said she has been at the [facility] for hours, but the paper Dr. Devi gave is not able to be used for handicap tag she said she needs to have a form faxed directly to them from the office. The copy that she has is not valid because it is not an original. Please fax a for to the [facility] directly [phone] and please call her to let her know.[phone] (home)   Thanks!

## 2017-08-28 RX ORDER — CERTOLIZUMAB PEGOL 200 MG/ML
INJECTION, SOLUTION SUBCUTANEOUS
Qty: 2 EACH | Refills: 11 | Status: SHIPPED | OUTPATIENT
Start: 2017-08-28 | End: 2017-10-27

## 2017-09-17 ENCOUNTER — PATIENT MESSAGE (OUTPATIENT)
Dept: RHEUMATOLOGY | Facility: CLINIC | Age: 47
End: 2017-09-17

## 2017-09-20 ENCOUNTER — PATIENT MESSAGE (OUTPATIENT)
Dept: RHEUMATOLOGY | Facility: CLINIC | Age: 47
End: 2017-09-20

## 2017-09-20 DIAGNOSIS — M47.817 LUMBAR AND SACRAL OSTEOARTHRITIS: ICD-10-CM

## 2017-09-20 DIAGNOSIS — N20.0 KIDNEY STONES: ICD-10-CM

## 2017-09-20 DIAGNOSIS — L40.8 PSORIASIS WITH PUSTULES: ICD-10-CM

## 2017-09-20 DIAGNOSIS — L40.50 PSORIATIC ARTHRITIS: ICD-10-CM

## 2017-09-21 RX ORDER — HYDROCODONE BITARTRATE AND ACETAMINOPHEN 7.5; 325 MG/1; MG/1
1 TABLET ORAL EVERY 8 HOURS PRN
Qty: 90 TABLET | Refills: 0 | Status: SHIPPED | OUTPATIENT
Start: 2017-09-21 | End: 2017-10-27 | Stop reason: SDUPTHER

## 2017-09-27 ENCOUNTER — TELEPHONE (OUTPATIENT)
Dept: RHEUMATOLOGY | Facility: CLINIC | Age: 47
End: 2017-09-27

## 2017-10-02 ENCOUNTER — PATIENT MESSAGE (OUTPATIENT)
Dept: RHEUMATOLOGY | Facility: CLINIC | Age: 47
End: 2017-10-02

## 2017-10-02 DIAGNOSIS — R10.9 ABDOMINAL CRAMPS: ICD-10-CM

## 2017-10-02 DIAGNOSIS — R14.0 BLOATING: Primary | ICD-10-CM

## 2017-10-03 ENCOUNTER — PATIENT MESSAGE (OUTPATIENT)
Dept: RHEUMATOLOGY | Facility: CLINIC | Age: 47
End: 2017-10-03

## 2017-10-04 ENCOUNTER — LAB VISIT (OUTPATIENT)
Dept: LAB | Facility: HOSPITAL | Age: 47
End: 2017-10-04
Attending: INTERNAL MEDICINE
Payer: COMMERCIAL

## 2017-10-04 DIAGNOSIS — R14.0 BLOATING: ICD-10-CM

## 2017-10-04 DIAGNOSIS — R10.9 ABDOMINAL CRAMPS: ICD-10-CM

## 2017-10-04 LAB — IGA SERPL-MCNC: 186 MG/DL

## 2017-10-04 PROCEDURE — 81376 HLA II TYPING 1 LOCUS LR: CPT | Mod: 91

## 2017-10-04 PROCEDURE — 83516 IMMUNOASSAY NONANTIBODY: CPT

## 2017-10-04 PROCEDURE — 82784 ASSAY IGA/IGD/IGG/IGM EACH: CPT

## 2017-10-05 LAB — TTG IGA SER IA-ACNC: 4 UNITS

## 2017-10-17 LAB
HLA CELIAC INTERPRETATION: ABNORMAL
HLA CELIAC SPECIMEN: ABNORMAL
HLA-DQ8 QL: POSITIVE
HLA-DQA1*05:01 QL: NEGATIVE
HLA-DQB1*02:01 QL: NEGATIVE

## 2017-10-18 ENCOUNTER — PATIENT MESSAGE (OUTPATIENT)
Dept: RHEUMATOLOGY | Facility: CLINIC | Age: 47
End: 2017-10-18

## 2017-10-22 DIAGNOSIS — M06.9 RHEUMATOID ARTHRITIS: ICD-10-CM

## 2017-10-22 DIAGNOSIS — M47.817 LUMBAR AND SACRAL OSTEOARTHRITIS: ICD-10-CM

## 2017-10-22 RX ORDER — TRAZODONE HYDROCHLORIDE 100 MG/1
TABLET ORAL
Qty: 30 TABLET | Refills: 3 | Status: SHIPPED | OUTPATIENT
Start: 2017-10-22 | End: 2018-11-24 | Stop reason: SDUPTHER

## 2017-10-23 NOTE — TELEPHONE ENCOUNTER
Yes looks like you likely have celiac disease. If you are able to take the gluten out of your diet it will help your gut and  joints

## 2017-10-25 ENCOUNTER — PATIENT MESSAGE (OUTPATIENT)
Dept: RHEUMATOLOGY | Facility: CLINIC | Age: 47
End: 2017-10-25

## 2017-10-27 ENCOUNTER — PATIENT MESSAGE (OUTPATIENT)
Dept: RHEUMATOLOGY | Facility: CLINIC | Age: 47
End: 2017-10-27

## 2017-10-27 DIAGNOSIS — M47.817 LUMBAR AND SACRAL OSTEOARTHRITIS: ICD-10-CM

## 2017-10-27 DIAGNOSIS — N20.0 KIDNEY STONES: ICD-10-CM

## 2017-10-27 DIAGNOSIS — L40.50 PSORIATIC ARTHRITIS: ICD-10-CM

## 2017-10-27 DIAGNOSIS — L40.8 PSORIASIS WITH PUSTULES: ICD-10-CM

## 2017-10-27 RX ORDER — HYDROCODONE BITARTRATE AND ACETAMINOPHEN 7.5; 325 MG/1; MG/1
1 TABLET ORAL EVERY 8 HOURS PRN
Qty: 90 TABLET | Refills: 0 | Status: SHIPPED | OUTPATIENT
Start: 2017-10-27 | End: 2017-12-13 | Stop reason: SDUPTHER

## 2017-10-30 NOTE — TELEPHONE ENCOUNTER
LVM- Hello [facility] received a prescription for Humira and we will contact their insurance company to find out if the medication is covered. We will update patient of status as more information is received. feel free to give us a call with  any questions at [phone].

## 2017-11-02 NOTE — TELEPHONE ENCOUNTER
DOCUMENTATION ONLY:  Prior authorization for Humira approved from 10/03/2017 to 11/02/2018    Case Id: [id number]    Co-pay: unkown- must fill at [facility]    Patient Assistance IS required  LEN

## 2017-11-03 ENCOUNTER — PATIENT MESSAGE (OUTPATIENT)
Dept: ADMINISTRATIVE | Facility: OTHER | Age: 47
End: 2017-11-03

## 2017-11-03 NOTE — TELEPHONE ENCOUNTER
FOR DOCUMENTATION ONLY:  Financial Assistance for Humira approved  Source: Copay Card  BIN: [id number]  PCN: [id number]  ID: [id number]  Group: [id number]  $5.00 copay    Must use [facility]  Ph: [phone]  Fx: [phone]

## 2017-11-06 ENCOUNTER — OFFICE VISIT (OUTPATIENT)
Dept: RHEUMATOLOGY | Facility: CLINIC | Age: 47
End: 2017-11-06
Payer: COMMERCIAL

## 2017-11-06 VITALS
BODY MASS INDEX: 27.66 KG/M2 | SYSTOLIC BLOOD PRESSURE: 128 MMHG | WEIGHT: 140.88 LBS | HEART RATE: 58 BPM | DIASTOLIC BLOOD PRESSURE: 80 MMHG | HEIGHT: 60 IN

## 2017-11-06 DIAGNOSIS — L40.50 PSORIATIC ARTHRITIS: ICD-10-CM

## 2017-11-06 DIAGNOSIS — M06.021 RHEUMATOID ARTHRITIS INVOLVING BOTH ELBOWS WITH NEGATIVE RHEUMATOID FACTOR: ICD-10-CM

## 2017-11-06 DIAGNOSIS — M06.022 RHEUMATOID ARTHRITIS INVOLVING BOTH ELBOWS WITH NEGATIVE RHEUMATOID FACTOR: ICD-10-CM

## 2017-11-06 DIAGNOSIS — N20.0 KIDNEY STONES: ICD-10-CM

## 2017-11-06 DIAGNOSIS — G56.01 CARPAL TUNNEL SYNDROME OF RIGHT WRIST: Primary | ICD-10-CM

## 2017-11-06 DIAGNOSIS — M47.817 LUMBAR AND SACRAL OSTEOARTHRITIS: ICD-10-CM

## 2017-11-06 DIAGNOSIS — L40.8 PSORIASIS WITH PUSTULES: ICD-10-CM

## 2017-11-06 PROCEDURE — 99214 OFFICE O/P EST MOD 30 MIN: CPT | Mod: 25,S$GLB,, | Performed by: INTERNAL MEDICINE

## 2017-11-06 PROCEDURE — 99999 PR PBB SHADOW E&M-EST. PATIENT-LVL III: CPT | Mod: PBBFAC,,, | Performed by: INTERNAL MEDICINE

## 2017-11-06 PROCEDURE — 96372 THER/PROPH/DIAG INJ SC/IM: CPT | Mod: S$GLB,,, | Performed by: INTERNAL MEDICINE

## 2017-11-06 RX ORDER — KETOROLAC TROMETHAMINE 30 MG/ML
60 INJECTION, SOLUTION INTRAMUSCULAR; INTRAVENOUS
Status: COMPLETED | OUTPATIENT
Start: 2017-11-06 | End: 2017-11-06

## 2017-11-06 RX ORDER — ESCITALOPRAM OXALATE 10 MG/1
TABLET ORAL
COMMUNITY
Start: 2017-10-31 | End: 2019-03-08 | Stop reason: SDUPTHER

## 2017-11-06 RX ORDER — CYANOCOBALAMIN 1000 UG/ML
1000 INJECTION, SOLUTION INTRAMUSCULAR; SUBCUTANEOUS
Status: COMPLETED | OUTPATIENT
Start: 2017-11-06 | End: 2017-11-06

## 2017-11-06 RX ORDER — DEXMETHYLPHENIDATE HYDROCHLORIDE 10 MG/1
TABLET ORAL
Refills: 0 | COMMUNITY
Start: 2017-10-09 | End: 2018-11-01

## 2017-11-06 RX ORDER — LOTEPREDNOL ETABONATE 5 MG/ML
SUSPENSION/ DROPS OPHTHALMIC
Refills: 0 | COMMUNITY
Start: 2017-09-23 | End: 2019-05-17 | Stop reason: SDUPTHER

## 2017-11-06 RX ORDER — METHYLPREDNISOLONE ACETATE 80 MG/ML
160 INJECTION, SUSPENSION INTRA-ARTICULAR; INTRALESIONAL; INTRAMUSCULAR; SOFT TISSUE
Status: COMPLETED | OUTPATIENT
Start: 2017-11-06 | End: 2017-11-06

## 2017-11-06 RX ORDER — TRIAMCINOLONE ACETONIDE 0.25 MG/G
CREAM TOPICAL 2 TIMES DAILY
Qty: 80 G | Refills: 5 | Status: SHIPPED | OUTPATIENT
Start: 2017-11-06 | End: 2019-03-08

## 2017-11-06 RX ORDER — DEXMETHYLPHENIDATE HYDROCHLORIDE 20 MG/1
CAPSULE, EXTENDED RELEASE ORAL
Refills: 0 | COMMUNITY
Start: 2017-10-09 | End: 2018-11-01

## 2017-11-06 RX ADMIN — CYANOCOBALAMIN 1000 MCG: 1000 INJECTION, SOLUTION INTRAMUSCULAR; SUBCUTANEOUS at 01:11

## 2017-11-06 RX ADMIN — KETOROLAC TROMETHAMINE 60 MG: 30 INJECTION, SOLUTION INTRAMUSCULAR; INTRAVENOUS at 01:11

## 2017-11-06 RX ADMIN — METHYLPREDNISOLONE ACETATE 160 MG: 80 INJECTION, SUSPENSION INTRA-ARTICULAR; INTRALESIONAL; INTRAMUSCULAR; SOFT TISSUE at 01:11

## 2017-11-06 NOTE — PROGRESS NOTES
Administered 1 cc ( 1000 mcg/ml ) of b12 to the right upper outer gluteal. Informed of s/s to report verbalized understanding. No adverse reactions noted.    Lot # [id number]  Expiration apr 19    Administered 2 cc ( 80 mg/ml ) of depomedrol to the right upper outer gluteal. Informed of s/s to report verbalized understanding. No adverse reactions noted.    Lot # [id number]  Expiration 09/2018    Administered 2 cc ( 30 mg/ml ) of toradol to the left upper outer gluteal. Informed of s/s to report verbalized understanding. No adverse reactions noted.    Lot # [id number]  Expiration 05/19

## 2017-11-06 NOTE — PROGRESS NOTES
Subjective:       Patient ID: Osiris Billingsley is a 47 y.o. female.    Chief Complaint: Follow-up    Follow up: psa and ra,uveitis is stable. Newly dx  Carpal tunnel .Patient complains of arthralgias and myalgias for which has been present for a few years. Pain is located in multiple joints, both shoulder(s), both elbow(s), both wrist(s), both MCP(s): 1st, 2nd, 3rd, 4th and 5th, both PIP(s): 1st, 2nd, 3rd, 4th and 5th, both DIP(s): 1st and 2nd, both hip(s), both knee(s) and both MTP(s): 1st, 2nd, 3rd, 4th and 5th, is described as aching, pulsating, shooting and throbbing, and is constant, moderate .  Associated symptoms include: crepitation, decreased range of motion, edema, effusion, tenderness and warmth.  Uveitis flare        Review of Systems   Constitutional: Positive for activity change. Negative for appetite change, chills, diaphoresis and unexpected weight change.   HENT: Negative for congestion, dental problem, ear discharge, ear pain, facial swelling, mouth sores, nosebleeds, postnasal drip, rhinorrhea, sinus pressure, sneezing, sore throat, tinnitus and voice change.    Eyes: Positive for pain and redness. Negative for photophobia, discharge and itching.   Respiratory: Negative for apnea, cough, chest tightness, shortness of breath and wheezing.    Cardiovascular: Positive for leg swelling. Negative for chest pain and palpitations.   Gastrointestinal: Negative for abdominal distention, abdominal pain, constipation, diarrhea, nausea and vomiting.   Endocrine: Negative for cold intolerance, heat intolerance, polydipsia and polyuria.   Genitourinary: Negative for decreased urine volume, difficulty urinating, flank pain, frequency, hematuria and urgency.   Musculoskeletal: Positive for arthralgias, back pain, gait problem, neck pain and neck stiffness.   Skin: Negative for pallor, rash and wound.   Allergic/Immunologic: Negative for immunocompromised state.   Neurological: Positive for weakness. Negative  for dizziness, tremors and numbness.   Hematological: Negative for adenopathy. Does not bruise/bleed easily.   Psychiatric/Behavioral: Negative for sleep disturbance. The patient is not nervous/anxious.          Objective:     /80   Pulse (!) 58   Ht 5' (1.524 m)   Wt 63.9 kg (140 lb 14 oz)   BMI 27.51 kg/m²      Physical Exam   Vitals reviewed.  Constitutional: She is oriented to person, place, and time and well-developed, well-nourished, and in no distress.   HENT:   Head: Normocephalic and atraumatic.   Mouth/Throat: Oropharynx is clear and moist.   Eyes: EOM are normal. Pupils are equal, round, and reactive to light.   Neck: Neck supple. No thyromegaly present.   Cardiovascular: Normal rate, regular rhythm and normal heart sounds.  Exam reveals no gallop and no friction rub.    No murmur heard.  Pulmonary/Chest: Breath sounds normal. She has no wheezes. She has no rales. She exhibits no tenderness.   Abdominal: There is no tenderness. There is no rebound and no guarding.       Right Side Rheumatological Exam     Examination finds the elbow, 1st MCP, 2nd MCP, 3rd MCP, 4th MCP and 5th MCP normal.    The patient is tender to palpation of the shoulder, wrist, knee, 1st PIP, 1st MCP, 2nd PIP, 2nd MCP, 3rd PIP, 3rd MCP, 4th PIP, 4th MCP, 5th PIP and 5th MCP    She has swelling of the knee    The patient has an enlarged wrist, 1st PIP, 2nd PIP, 3rd PIP, 4th PIP and 5th PIP    Shoulder Exam   Tenderness Location: acromion    Range of Motion   Active Abduction: abnormal   Adduction: abnormal  Sensation: normal    Knee Exam   Tenderness Location: medial joint line  Patellofemoral Crepitus: positive  Effusion: positive  Sensation: normal    Hip Exam   Tenderness Location: no tenderness  Sensation: normal    Elbow/Wrist Exam   Tenderness Location: no tenderness  Sensation: normal    Left Side Rheumatological Exam     Examination finds the elbow, knee, 1st MCP, 2nd MCP, 3rd MCP, 4th MCP and 5th MCP normal.    The  patient is tender to palpation of the shoulder, elbow, wrist, knee, 1st PIP, 1st MCP, 2nd PIP, 2nd MCP, 3rd PIP, 3rd MCP, 4th PIP, 4th MCP, 5th PIP and 5th MCP.    The patient has an enlarged wrist, 1st PIP, 2nd PIP, 3rd PIP, 4th PIP and 5th PIP.    Shoulder Exam   Tenderness Location: acromion    Range of Motion   Active Abduction: abnormal   Sensation: normal    Knee Exam   Tenderness Location: lateral joint line and medial joint line    Patellofemoral Crepitus: positive  Effusion: positive  Sensation: normal    Hip Exam   Tenderness Location: no tenderness  Sensation: normal    Elbow/Wrist Exam   Sensation: normal      Back/Neck Exam   General Inspection   Gait: normal       Tenderness Right paramedian tenderness of the Lower C-Spine and Lower L-Spine.Left paramedian tenderness of the Upper C-Spine and Lower L-Spine.      Lymphadenopathy:     She has no cervical adenopathy.   Neurological: She is alert and oriented to person, place, and time. Gait normal.   Reflex Scores:       Patellar reflexes are 3+ on the right side and 3+ on the left side.  Skin: Rash noted. No erythema. There is pallor.     Psoriasis B knee   Psychiatric: Mood, memory, affect and judgment normal.   Musculoskeletal: She exhibits edema, tenderness and deformity.           Results for orders placed or performed in visit on 10/04/17   Tissue transglutaminase, IgA   Result Value Ref Range    TTG IgA 4 <20 UNITS   IgA   Result Value Ref Range    IgA 186 40 - 350 mg/dL   Celiac Disease HLA Genotyping   Result Value Ref Range    HLA Celiac Specimen Whole Blood     Celiac (HLA-DQB1*02) Negative     Celiac (HLA-DQ8) Positive (A)     Celiac (HLA-DQA1*05) Negative     HLA Celiac Interpretation See Note        Assessment:       Encounter Diagnoses   Name Primary?    Rheumatoid arthritis involving both elbows with negative rheumatoid factor     Psoriatic arthritis     Kidney stones     Psoriasis with pustules     Lumbar and sacral osteoarthritis             Plan:       Carpal tunnel syndrome of right wrist  -     methylPREDNISolone acetate injection 160 mg; Inject 2 mLs (160 mg total) into the muscle one time.  -     ketorolac injection 60 mg; Inject 2 mLs (60 mg total) into the muscle one time.  -     cyanocobalamin injection 1,000 mcg; Inject 1 mL (1,000 mcg total) into the muscle one time.  -     X-Ray Cervical Spine AP And Lateral; Future; Expected date: 11/06/2017    Rheumatoid arthritis involving both elbows with negative rheumatoid factor  -     triamcinolone acetonide 0.025% (KENALOG) 0.025 % cream; Apply topically 2 (two) times daily.  Dispense: 80 g; Refill: 5  -     methylPREDNISolone acetate injection 160 mg; Inject 2 mLs (160 mg total) into the muscle one time.  -     ketorolac injection 60 mg; Inject 2 mLs (60 mg total) into the muscle one time.  -     cyanocobalamin injection 1,000 mcg; Inject 1 mL (1,000 mcg total) into the muscle one time.  -     X-Ray Cervical Spine AP And Lateral; Future; Expected date: 11/06/2017    Psoriatic arthritis  -     triamcinolone acetonide 0.025% (KENALOG) 0.025 % cream; Apply topically 2 (two) times daily.  Dispense: 80 g; Refill: 5  -     methylPREDNISolone acetate injection 160 mg; Inject 2 mLs (160 mg total) into the muscle one time.  -     ketorolac injection 60 mg; Inject 2 mLs (60 mg total) into the muscle one time.  -     cyanocobalamin injection 1,000 mcg; Inject 1 mL (1,000 mcg total) into the muscle one time.  -     X-Ray Cervical Spine AP And Lateral; Future; Expected date: 11/06/2017    Kidney stones  -     triamcinolone acetonide 0.025% (KENALOG) 0.025 % cream; Apply topically 2 (two) times daily.  Dispense: 80 g; Refill: 5  -     methylPREDNISolone acetate injection 160 mg; Inject 2 mLs (160 mg total) into the muscle one time.  -     ketorolac injection 60 mg; Inject 2 mLs (60 mg total) into the muscle one time.  -     cyanocobalamin injection 1,000 mcg; Inject 1 mL (1,000 mcg total) into the  muscle one time.  -     X-Ray Cervical Spine AP And Lateral; Future; Expected date: 11/06/2017    Psoriasis with pustules  -     triamcinolone acetonide 0.025% (KENALOG) 0.025 % cream; Apply topically 2 (two) times daily.  Dispense: 80 g; Refill: 5  -     methylPREDNISolone acetate injection 160 mg; Inject 2 mLs (160 mg total) into the muscle one time.  -     ketorolac injection 60 mg; Inject 2 mLs (60 mg total) into the muscle one time.  -     cyanocobalamin injection 1,000 mcg; Inject 1 mL (1,000 mcg total) into the muscle one time.  -     X-Ray Cervical Spine AP And Lateral; Future; Expected date: 11/06/2017    Lumbar and sacral osteoarthritis  -     methylPREDNISolone acetate injection 160 mg; Inject 2 mLs (160 mg total) into the muscle one time.  -     ketorolac injection 60 mg; Inject 2 mLs (60 mg total) into the muscle one time.  -     cyanocobalamin injection 1,000 mcg; Inject 1 mL (1,000 mcg total) into the muscle one time.  -     X-Ray Cervical Spine AP And Lateral; Future; Expected date: 11/06/2017     carpal tunnel injection, 40 mgR

## 2017-11-07 ENCOUNTER — PATIENT MESSAGE (OUTPATIENT)
Dept: ADMINISTRATIVE | Facility: OTHER | Age: 47
End: 2017-11-07

## 2017-11-21 ENCOUNTER — PATIENT MESSAGE (OUTPATIENT)
Dept: ADMINISTRATIVE | Facility: OTHER | Age: 47
End: 2017-11-21

## 2017-11-21 NOTE — TELEPHONE ENCOUNTER
Humira prior authorization has been approved through 11/2/18. Patient's insurance requires the patient to fill through [facility]. Please send prescription to [facility], which has been added to the patient's EPIC profile. Patient has been notified and provided with the necessary info to call and schedule a delivery.     To complete this in EPIC, the original order MUST be discontinued and re-typed as a new prescription with the updated pharmacy listed. Clicking reorder will continue to route the rx to [facility] even if the pharmacy is changed. Please opt the patient out of [facility] when the BPA is fired.      Detail Level: Detailed

## 2017-12-01 ENCOUNTER — PATIENT MESSAGE (OUTPATIENT)
Dept: RHEUMATOLOGY | Facility: CLINIC | Age: 47
End: 2017-12-01

## 2017-12-03 ENCOUNTER — PATIENT MESSAGE (OUTPATIENT)
Dept: RHEUMATOLOGY | Facility: CLINIC | Age: 47
End: 2017-12-03

## 2017-12-12 ENCOUNTER — PATIENT MESSAGE (OUTPATIENT)
Dept: RHEUMATOLOGY | Facility: CLINIC | Age: 47
End: 2017-12-12

## 2017-12-12 DIAGNOSIS — N20.0 KIDNEY STONES: ICD-10-CM

## 2017-12-12 DIAGNOSIS — M47.817 LUMBAR AND SACRAL OSTEOARTHRITIS: ICD-10-CM

## 2017-12-12 DIAGNOSIS — L40.50 PSORIATIC ARTHRITIS: ICD-10-CM

## 2017-12-12 DIAGNOSIS — L40.8 PSORIASIS WITH PUSTULES: ICD-10-CM

## 2017-12-15 RX ORDER — HYDROCODONE BITARTRATE AND ACETAMINOPHEN 7.5; 325 MG/1; MG/1
1 TABLET ORAL EVERY 8 HOURS PRN
Qty: 90 TABLET | Refills: 0 | Status: SHIPPED | OUTPATIENT
Start: 2017-12-15 | End: 2017-12-18 | Stop reason: SDUPTHER

## 2017-12-18 ENCOUNTER — PATIENT MESSAGE (OUTPATIENT)
Dept: RHEUMATOLOGY | Facility: CLINIC | Age: 47
End: 2017-12-18

## 2017-12-18 DIAGNOSIS — L40.50 PSORIATIC ARTHRITIS: ICD-10-CM

## 2017-12-18 DIAGNOSIS — N20.0 KIDNEY STONES: ICD-10-CM

## 2017-12-18 DIAGNOSIS — L40.8 PSORIASIS WITH PUSTULES: ICD-10-CM

## 2017-12-18 DIAGNOSIS — M47.817 LUMBAR AND SACRAL OSTEOARTHRITIS: ICD-10-CM

## 2017-12-18 RX ORDER — HYDROCODONE BITARTRATE AND ACETAMINOPHEN 7.5; 325 MG/1; MG/1
1 TABLET ORAL EVERY 8 HOURS PRN
Qty: 90 TABLET | Refills: 0 | Status: SHIPPED | OUTPATIENT
Start: 2017-12-18 | End: 2018-02-09 | Stop reason: SDUPTHER

## 2018-02-08 ENCOUNTER — PATIENT MESSAGE (OUTPATIENT)
Dept: RHEUMATOLOGY | Facility: CLINIC | Age: 48
End: 2018-02-08

## 2018-02-08 DIAGNOSIS — M47.817 LUMBAR AND SACRAL OSTEOARTHRITIS: ICD-10-CM

## 2018-02-08 DIAGNOSIS — L40.8 PSORIASIS WITH PUSTULES: ICD-10-CM

## 2018-02-08 DIAGNOSIS — N20.0 KIDNEY STONES: ICD-10-CM

## 2018-02-08 DIAGNOSIS — L40.50 PSORIATIC ARTHRITIS: ICD-10-CM

## 2018-02-15 RX ORDER — HYDROCODONE BITARTRATE AND ACETAMINOPHEN 7.5; 325 MG/1; MG/1
1 TABLET ORAL EVERY 8 HOURS PRN
Qty: 90 TABLET | Refills: 0 | Status: SHIPPED | OUTPATIENT
Start: 2018-02-15 | End: 2018-04-03 | Stop reason: SDUPTHER

## 2018-03-06 DIAGNOSIS — K51.90 ULCERATIVE COLITIS WITHOUT COMPLICATIONS: ICD-10-CM

## 2018-03-06 DIAGNOSIS — M47.817 LUMBAR AND SACRAL OSTEOARTHRITIS: ICD-10-CM

## 2018-03-06 DIAGNOSIS — M79.7 FIBROMYALGIA: ICD-10-CM

## 2018-03-07 RX ORDER — VALACYCLOVIR HYDROCHLORIDE 1 G/1
TABLET, FILM COATED ORAL
Qty: 42 TABLET | Refills: 3 | Status: SHIPPED | OUTPATIENT
Start: 2018-03-07 | End: 2018-08-07 | Stop reason: SDUPTHER

## 2018-04-03 ENCOUNTER — PATIENT MESSAGE (OUTPATIENT)
Dept: RHEUMATOLOGY | Facility: CLINIC | Age: 48
End: 2018-04-03

## 2018-04-03 DIAGNOSIS — L40.8 PSORIASIS WITH PUSTULES: ICD-10-CM

## 2018-04-03 DIAGNOSIS — M47.817 LUMBAR AND SACRAL OSTEOARTHRITIS: ICD-10-CM

## 2018-04-03 DIAGNOSIS — N20.0 KIDNEY STONES: ICD-10-CM

## 2018-04-03 DIAGNOSIS — L40.50 PSORIATIC ARTHRITIS: ICD-10-CM

## 2018-04-04 RX ORDER — HYDROCODONE BITARTRATE AND ACETAMINOPHEN 7.5; 325 MG/1; MG/1
1 TABLET ORAL EVERY 8 HOURS PRN
Qty: 90 TABLET | Refills: 0 | Status: SHIPPED | OUTPATIENT
Start: 2018-04-04 | End: 2018-06-04 | Stop reason: SDUPTHER

## 2018-04-18 ENCOUNTER — PATIENT MESSAGE (OUTPATIENT)
Dept: RHEUMATOLOGY | Facility: CLINIC | Age: 48
End: 2018-04-18

## 2018-04-18 DIAGNOSIS — M06.9 RHEUMATOID ARTHRITIS, INVOLVING UNSPECIFIED SITE, UNSPECIFIED RHEUMATOID FACTOR PRESENCE: Primary | ICD-10-CM

## 2018-04-18 NOTE — TELEPHONE ENCOUNTER
Spoke to pt, rescheduled for May 17th, and placed orders for follow up labs. Pt verbalized understanding.

## 2018-04-19 ENCOUNTER — LAB VISIT (OUTPATIENT)
Dept: LAB | Facility: HOSPITAL | Age: 48
End: 2018-04-19
Attending: INTERNAL MEDICINE
Payer: COMMERCIAL

## 2018-04-19 DIAGNOSIS — M06.9 RHEUMATOID ARTHRITIS, INVOLVING UNSPECIFIED SITE, UNSPECIFIED RHEUMATOID FACTOR PRESENCE: ICD-10-CM

## 2018-04-19 LAB
ALBUMIN SERPL BCP-MCNC: 4.1 G/DL
ALP SERPL-CCNC: 66 U/L
ALT SERPL W/O P-5'-P-CCNC: 19 U/L
ANION GAP SERPL CALC-SCNC: 8 MMOL/L
AST SERPL-CCNC: 19 U/L
BASOPHILS # BLD AUTO: 0.04 K/UL
BASOPHILS NFR BLD: 0.7 %
BILIRUB SERPL-MCNC: 0.7 MG/DL
BUN SERPL-MCNC: 17 MG/DL
CALCIUM SERPL-MCNC: 9.9 MG/DL
CHLORIDE SERPL-SCNC: 103 MMOL/L
CO2 SERPL-SCNC: 28 MMOL/L
CREAT SERPL-MCNC: 0.9 MG/DL
CRP SERPL-MCNC: 0.8 MG/L
DIFFERENTIAL METHOD: ABNORMAL
EOSINOPHIL # BLD AUTO: 0.1 K/UL
EOSINOPHIL NFR BLD: 1.6 %
ERYTHROCYTE [DISTWIDTH] IN BLOOD BY AUTOMATED COUNT: 13.1 %
ERYTHROCYTE [SEDIMENTATION RATE] IN BLOOD BY WESTERGREN METHOD: 8 MM/HR
EST. GFR  (AFRICAN AMERICAN): >60 ML/MIN/1.73 M^2
EST. GFR  (NON AFRICAN AMERICAN): >60 ML/MIN/1.73 M^2
GLUCOSE SERPL-MCNC: 74 MG/DL
HCT VFR BLD AUTO: 40 %
HGB BLD-MCNC: 12.5 G/DL
IMM GRANULOCYTES # BLD AUTO: 0.01 K/UL
IMM GRANULOCYTES NFR BLD AUTO: 0.2 %
LYMPHOCYTES # BLD AUTO: 1.9 K/UL
LYMPHOCYTES NFR BLD: 33.4 %
MCH RBC QN AUTO: 29.6 PG
MCHC RBC AUTO-ENTMCNC: 31.3 G/DL
MCV RBC AUTO: 95 FL
MONOCYTES # BLD AUTO: 0.3 K/UL
MONOCYTES NFR BLD: 6 %
NEUTROPHILS # BLD AUTO: 3.3 K/UL
NEUTROPHILS NFR BLD: 58.1 %
NRBC BLD-RTO: 0 /100 WBC
PLATELET # BLD AUTO: 241 K/UL
PMV BLD AUTO: 11.6 FL
POTASSIUM SERPL-SCNC: 4.9 MMOL/L
PROT SERPL-MCNC: 7.3 G/DL
RBC # BLD AUTO: 4.22 M/UL
RHEUMATOID FACT SERPL-ACNC: <10 IU/ML
SODIUM SERPL-SCNC: 139 MMOL/L
T3FREE SERPL-MCNC: 2.7 PG/ML
T4 FREE SERPL-MCNC: 0.97 NG/DL
TSH SERPL DL<=0.005 MIU/L-ACNC: 0.9 UIU/ML
WBC # BLD AUTO: 5.63 K/UL

## 2018-04-19 PROCEDURE — 86431 RHEUMATOID FACTOR QUANT: CPT

## 2018-04-19 PROCEDURE — 85025 COMPLETE CBC W/AUTO DIFF WBC: CPT

## 2018-04-19 PROCEDURE — 85651 RBC SED RATE NONAUTOMATED: CPT | Mod: PO

## 2018-04-19 PROCEDURE — 84481 FREE ASSAY (FT-3): CPT

## 2018-04-19 PROCEDURE — 36415 COLL VENOUS BLD VENIPUNCTURE: CPT | Mod: PO

## 2018-04-19 PROCEDURE — 80053 COMPREHEN METABOLIC PANEL: CPT

## 2018-04-19 PROCEDURE — 84443 ASSAY THYROID STIM HORMONE: CPT

## 2018-04-19 PROCEDURE — 86038 ANTINUCLEAR ANTIBODIES: CPT

## 2018-04-19 PROCEDURE — 84439 ASSAY OF FREE THYROXINE: CPT

## 2018-04-19 PROCEDURE — 86140 C-REACTIVE PROTEIN: CPT

## 2018-04-20 LAB — ANA SER QL IF: NORMAL

## 2018-05-09 DIAGNOSIS — Z79.60 LONG-TERM USE OF IMMUNOSUPPRESSANT MEDICATION: Primary | ICD-10-CM

## 2018-05-29 ENCOUNTER — PATIENT MESSAGE (OUTPATIENT)
Dept: RHEUMATOLOGY | Facility: CLINIC | Age: 48
End: 2018-05-29

## 2018-06-01 ENCOUNTER — PATIENT MESSAGE (OUTPATIENT)
Dept: RHEUMATOLOGY | Facility: CLINIC | Age: 48
End: 2018-06-01

## 2018-06-01 DIAGNOSIS — N20.0 KIDNEY STONES: ICD-10-CM

## 2018-06-01 DIAGNOSIS — M47.817 LUMBAR AND SACRAL OSTEOARTHRITIS: ICD-10-CM

## 2018-06-01 DIAGNOSIS — L40.50 PSORIATIC ARTHRITIS: ICD-10-CM

## 2018-06-01 DIAGNOSIS — L40.8 PSORIASIS WITH PUSTULES: ICD-10-CM

## 2018-06-04 NOTE — TELEPHONE ENCOUNTER
Pt having TB and hepatic panel drawn tomorrow. Were not drawn with other lab work. Nurse injections appt scheduled. Pt advised that rx request sent, just waiting on approval. Pt has also scheduled next available appt.

## 2018-06-05 ENCOUNTER — LAB VISIT (OUTPATIENT)
Dept: LAB | Facility: HOSPITAL | Age: 48
End: 2018-06-05
Attending: INTERNAL MEDICINE
Payer: COMMERCIAL

## 2018-06-05 DIAGNOSIS — Z79.60 LONG-TERM USE OF IMMUNOSUPPRESSANT MEDICATION: ICD-10-CM

## 2018-06-05 LAB
HAV IGM SERPL QL IA: NEGATIVE
HBV CORE IGM SERPL QL IA: NEGATIVE
HBV SURFACE AG SERPL QL IA: NEGATIVE
HCV AB SERPL QL IA: NEGATIVE

## 2018-06-05 PROCEDURE — 80074 ACUTE HEPATITIS PANEL: CPT

## 2018-06-05 PROCEDURE — 86480 TB TEST CELL IMMUN MEASURE: CPT

## 2018-06-05 PROCEDURE — 36415 COLL VENOUS BLD VENIPUNCTURE: CPT | Mod: PO

## 2018-06-05 RX ORDER — HYDROCODONE BITARTRATE AND ACETAMINOPHEN 7.5; 325 MG/1; MG/1
1 TABLET ORAL EVERY 8 HOURS PRN
Qty: 90 TABLET | Refills: 0 | Status: SHIPPED | OUTPATIENT
Start: 2018-06-05 | End: 2018-11-01 | Stop reason: SDUPTHER

## 2018-06-07 LAB
MITOGEN NIL: >10 IU/ML
NIL: 0.06 IU/ML
TB ANTIGEN NIL: 0.06 IU/ML
TB ANTIGEN: 0.11 IU/ML
TB GOLD: NEGATIVE

## 2018-06-08 ENCOUNTER — CLINICAL SUPPORT (OUTPATIENT)
Dept: RHEUMATOLOGY | Facility: CLINIC | Age: 48
End: 2018-06-08
Payer: COMMERCIAL

## 2018-06-08 VITALS — SYSTOLIC BLOOD PRESSURE: 117 MMHG | DIASTOLIC BLOOD PRESSURE: 80 MMHG | HEART RATE: 63 BPM

## 2018-06-08 DIAGNOSIS — L40.50 PSORIATIC ARTHRITIS: Primary | ICD-10-CM

## 2018-06-08 DIAGNOSIS — M47.817 LUMBAR AND SACRAL OSTEOARTHRITIS: ICD-10-CM

## 2018-06-08 DIAGNOSIS — M06.9 RHEUMATOID ARTHRITIS, INVOLVING UNSPECIFIED SITE, UNSPECIFIED RHEUMATOID FACTOR PRESENCE: ICD-10-CM

## 2018-06-08 PROCEDURE — 96372 THER/PROPH/DIAG INJ SC/IM: CPT | Mod: S$GLB,,, | Performed by: INTERNAL MEDICINE

## 2018-06-08 PROCEDURE — 99999 PR PBB SHADOW E&M-EST. PATIENT-LVL III: CPT | Mod: PBBFAC,,,

## 2018-06-08 RX ORDER — CYANOCOBALAMIN 1000 UG/ML
1000 INJECTION, SOLUTION INTRAMUSCULAR; SUBCUTANEOUS
Status: COMPLETED | OUTPATIENT
Start: 2018-06-08 | End: 2018-06-08

## 2018-06-08 RX ORDER — KETOROLAC TROMETHAMINE 30 MG/ML
60 INJECTION, SOLUTION INTRAMUSCULAR; INTRAVENOUS
Status: COMPLETED | OUTPATIENT
Start: 2018-06-08 | End: 2018-06-08

## 2018-06-08 RX ORDER — METHYLPREDNISOLONE ACETATE 80 MG/ML
160 INJECTION, SUSPENSION INTRA-ARTICULAR; INTRALESIONAL; INTRAMUSCULAR; SOFT TISSUE
Status: COMPLETED | OUTPATIENT
Start: 2018-06-08 | End: 2018-06-08

## 2018-06-08 RX ADMIN — CYANOCOBALAMIN 1000 MCG: 1000 INJECTION, SOLUTION INTRAMUSCULAR; SUBCUTANEOUS at 11:06

## 2018-06-08 RX ADMIN — KETOROLAC TROMETHAMINE 60 MG: 30 INJECTION, SOLUTION INTRAMUSCULAR; INTRAVENOUS at 11:06

## 2018-06-08 RX ADMIN — METHYLPREDNISOLONE ACETATE 160 MG: 80 INJECTION, SUSPENSION INTRA-ARTICULAR; INTRALESIONAL; INTRAMUSCULAR; SOFT TISSUE at 11:06

## 2018-06-08 NOTE — PROGRESS NOTES
Pt presents for nurse injections. Reports pain 6/10 on pain scale in wrists, elbows and feet. C/C of increased numbness and difficulty with wrists along with functionality. Discussed seeing a hand specialist for further evaluation as pt is concerned with carpal tunnel. Pt will let us know who she wishes to see under her insurance. Labs reviewed, Dr. Shandra WEBSTER 160mg DepoMedrol, 60mg Toradol, 1000mcg B12. Administered 1 cc Vitamin B12 1000mcg/cc to right upper outer gluteal. Pt tolerated well. No acute reaction noted to site. Pt instructed on S/S to report. Advised patient to wait in lobby 15 minutes after receiving injection to monitor for any reactions. Pt verbalized understanding.     Lot: [id number]  Exp: [id number]  Administered 2 cc DepoMedrol 80mg/cc  to right upper outer gluteal. Pt tolerated well. No acute reaction noted to site. Pt instructed on S/S to report. Advised patient to wait in lobby 15 minutes after receiving injection to monitor for any reactions..  Pt verbalized understanding.     Lot: [id number]  Exp: 05/19  Administered 2 cc  Toradol 30mg/cc  to left upper outer gluteal. Pt tolerated well. No acute reaction noted to site. Pt instructed on S/S to report. Advised patient to wait in lobby 15 minutes after receiving injection to monitor for any reactions. Pt verbalized understanding    Lot: [id number]  Exp: [id number]

## 2018-07-31 ENCOUNTER — DOCUMENTATION ONLY (OUTPATIENT)
Dept: RHEUMATOLOGY | Facility: CLINIC | Age: 48
End: 2018-07-31

## 2018-08-01 DIAGNOSIS — M47.817 LUMBAR AND SACRAL OSTEOARTHRITIS: ICD-10-CM

## 2018-08-01 DIAGNOSIS — L40.50 PSORIATIC ARTHRITIS: ICD-10-CM

## 2018-08-01 DIAGNOSIS — L40.8 PSORIASIS WITH PUSTULES: ICD-10-CM

## 2018-08-01 DIAGNOSIS — N20.0 KIDNEY STONES: ICD-10-CM

## 2018-08-03 RX ORDER — HYDROCODONE BITARTRATE AND ACETAMINOPHEN 7.5; 325 MG/1; MG/1
1 TABLET ORAL EVERY 8 HOURS PRN
Qty: 90 TABLET | Refills: 0 | OUTPATIENT
Start: 2018-08-03

## 2018-08-07 DIAGNOSIS — L40.50 PSORIATIC ARTHRITIS: ICD-10-CM

## 2018-08-07 DIAGNOSIS — M79.7 FIBROMYALGIA: ICD-10-CM

## 2018-08-07 DIAGNOSIS — L40.8 PSORIASIS WITH PUSTULES: ICD-10-CM

## 2018-08-07 DIAGNOSIS — K51.90 ULCERATIVE COLITIS WITHOUT COMPLICATIONS: ICD-10-CM

## 2018-08-07 DIAGNOSIS — N20.0 KIDNEY STONES: ICD-10-CM

## 2018-08-07 DIAGNOSIS — M47.817 LUMBAR AND SACRAL OSTEOARTHRITIS: ICD-10-CM

## 2018-08-08 RX ORDER — HYDROCODONE BITARTRATE AND ACETAMINOPHEN 7.5; 325 MG/1; MG/1
1 TABLET ORAL EVERY 8 HOURS PRN
Qty: 90 TABLET | Refills: 0 | OUTPATIENT
Start: 2018-08-08

## 2018-08-08 RX ORDER — VALACYCLOVIR HYDROCHLORIDE 1 G/1
1000 TABLET, FILM COATED ORAL EVERY 12 HOURS
Qty: 42 TABLET | Refills: 3 | Status: SHIPPED | OUTPATIENT
Start: 2018-08-08 | End: 2019-03-08

## 2018-09-11 ENCOUNTER — PATIENT MESSAGE (OUTPATIENT)
Dept: RHEUMATOLOGY | Facility: CLINIC | Age: 48
End: 2018-09-11

## 2018-11-01 ENCOUNTER — OFFICE VISIT (OUTPATIENT)
Dept: RHEUMATOLOGY | Facility: CLINIC | Age: 48
End: 2018-11-01
Payer: COMMERCIAL

## 2018-11-01 VITALS
DIASTOLIC BLOOD PRESSURE: 91 MMHG | WEIGHT: 119.81 LBS | SYSTOLIC BLOOD PRESSURE: 145 MMHG | HEIGHT: 60 IN | HEART RATE: 68 BPM | BODY MASS INDEX: 23.52 KG/M2

## 2018-11-01 DIAGNOSIS — L40.50 PSORIATIC ARTHRITIS: ICD-10-CM

## 2018-11-01 DIAGNOSIS — M06.021 RHEUMATOID ARTHRITIS INVOLVING BOTH ELBOWS WITH NEGATIVE RHEUMATOID FACTOR: Primary | ICD-10-CM

## 2018-11-01 DIAGNOSIS — N20.0 KIDNEY STONES: ICD-10-CM

## 2018-11-01 DIAGNOSIS — M47.817 LUMBAR AND SACRAL OSTEOARTHRITIS: ICD-10-CM

## 2018-11-01 DIAGNOSIS — L40.8 PSORIASIS WITH PUSTULES: ICD-10-CM

## 2018-11-01 DIAGNOSIS — M06.022 RHEUMATOID ARTHRITIS INVOLVING BOTH ELBOWS WITH NEGATIVE RHEUMATOID FACTOR: Primary | ICD-10-CM

## 2018-11-01 PROCEDURE — 3008F BODY MASS INDEX DOCD: CPT | Mod: CPTII,S$GLB,, | Performed by: INTERNAL MEDICINE

## 2018-11-01 PROCEDURE — 99999 PR PBB SHADOW E&M-EST. PATIENT-LVL III: CPT | Mod: PBBFAC,,, | Performed by: INTERNAL MEDICINE

## 2018-11-01 PROCEDURE — 99214 OFFICE O/P EST MOD 30 MIN: CPT | Mod: 25,S$GLB,, | Performed by: INTERNAL MEDICINE

## 2018-11-01 PROCEDURE — 96372 THER/PROPH/DIAG INJ SC/IM: CPT | Mod: S$GLB,,, | Performed by: INTERNAL MEDICINE

## 2018-11-01 RX ORDER — HYDROCODONE BITARTRATE AND ACETAMINOPHEN 7.5; 325 MG/1; MG/1
1 TABLET ORAL EVERY 8 HOURS PRN
Qty: 90 TABLET | Refills: 0 | Status: SHIPPED | OUTPATIENT
Start: 2019-01-01 | End: 2019-02-13 | Stop reason: SDUPTHER

## 2018-11-01 RX ORDER — METHYLPREDNISOLONE ACETATE 80 MG/ML
160 INJECTION, SUSPENSION INTRA-ARTICULAR; INTRALESIONAL; INTRAMUSCULAR; SOFT TISSUE
Status: COMPLETED | OUTPATIENT
Start: 2018-11-01 | End: 2018-11-01

## 2018-11-01 RX ORDER — LISDEXAMFETAMINE DIMESYLATE 40 MG/1
40 CAPSULE ORAL DAILY
Refills: 0 | COMMUNITY
Start: 2018-10-29 | End: 2024-02-01

## 2018-11-01 RX ORDER — HYDROCODONE BITARTRATE AND ACETAMINOPHEN 7.5; 325 MG/1; MG/1
1 TABLET ORAL EVERY 8 HOURS PRN
Qty: 90 TABLET | Refills: 0 | Status: SHIPPED | OUTPATIENT
Start: 2018-11-01 | End: 2018-11-01 | Stop reason: SDUPTHER

## 2018-11-01 RX ORDER — KETOROLAC TROMETHAMINE 30 MG/ML
60 INJECTION, SOLUTION INTRAMUSCULAR; INTRAVENOUS
Status: COMPLETED | OUTPATIENT
Start: 2018-11-01 | End: 2018-11-01

## 2018-11-01 RX ORDER — SPIRONOLACTONE 100 MG/1
TABLET, FILM COATED ORAL
Refills: 6 | COMMUNITY
Start: 2018-08-28 | End: 2019-03-08

## 2018-11-01 RX ORDER — HYDROCODONE BITARTRATE AND ACETAMINOPHEN 7.5; 325 MG/1; MG/1
1 TABLET ORAL EVERY 8 HOURS PRN
Qty: 90 TABLET | Refills: 0 | Status: SHIPPED | OUTPATIENT
Start: 2018-12-01 | End: 2018-11-01 | Stop reason: SDUPTHER

## 2018-11-01 RX ORDER — PROGESTERONE 200 MG/1
CAPSULE ORAL
Refills: 4 | COMMUNITY
Start: 2018-08-28 | End: 2019-10-23

## 2018-11-01 RX ADMIN — METHYLPREDNISOLONE ACETATE 160 MG: 80 INJECTION, SUSPENSION INTRA-ARTICULAR; INTRALESIONAL; INTRAMUSCULAR; SOFT TISSUE at 05:11

## 2018-11-01 RX ADMIN — KETOROLAC TROMETHAMINE 60 MG: 30 INJECTION, SOLUTION INTRAMUSCULAR; INTRAVENOUS at 05:11

## 2018-11-01 NOTE — PROGRESS NOTES
Subjective:       Patient ID: Osiris Billingsley is a 48 y.o. female.    Chief Complaint: Follow-up    Follow up: psa Ra UC, uveitis  Off all tx  April..Patient complains of arthralgias and myalgias for which has been present for a few years. Pain is located in multiple joints, both shoulder(s), both elbow(s), both wrist(s), both MCP(s): 1st, 2nd, 3rd, 4th and 5th, both PIP(s): 1st, 2nd, 3rd, 4th and 5th, both DIP(s): 1st and 2nd, both hip(s), both knee(s) and both MTP(s): 1st, 2nd, 3rd, 4th and 5th, is described as aching, pulsating, shooting and throbbing, and is constant, moderate .  Associated symptoms include: crepitation, decreased range of motion, edema, effusion, tenderness and warmth.  Uveitis flare        Review of Systems   Constitutional: Positive for activity change. Negative for appetite change, chills, diaphoresis and unexpected weight change.   HENT: Negative for congestion, dental problem, ear discharge, ear pain, facial swelling, mouth sores, nosebleeds, postnasal drip, rhinorrhea, sinus pressure, sneezing, sore throat, tinnitus and voice change.    Eyes: Positive for pain and redness. Negative for photophobia, discharge and itching.   Respiratory: Negative for apnea, cough, chest tightness, shortness of breath and wheezing.    Cardiovascular: Positive for leg swelling. Negative for chest pain and palpitations.   Gastrointestinal: Negative for abdominal distention, abdominal pain, constipation, diarrhea, nausea and vomiting.   Endocrine: Negative for cold intolerance, heat intolerance, polydipsia and polyuria.   Genitourinary: Negative for decreased urine volume, difficulty urinating, flank pain, frequency, hematuria and urgency.   Musculoskeletal: Positive for arthralgias, back pain, joint swelling, neck pain and neck stiffness. Negative for gait problem.   Skin: Negative for pallor, rash and wound.   Allergic/Immunologic: Negative for immunocompromised state.   Neurological: Positive for  weakness. Negative for dizziness, tremors and numbness.   Hematological: Negative for adenopathy. Does not bruise/bleed easily.   Psychiatric/Behavioral: Negative for sleep disturbance. The patient is not nervous/anxious.          Objective:     BP (!) 145/91 (BP Location: Right arm, Patient Position: Sitting, BP Method: Medium (Automatic))   Pulse 68   Ht 5' (1.524 m)   Wt 54.3 kg (119 lb 13.1 oz)   BMI 23.40 kg/m²      Physical Exam   Vitals reviewed.  Constitutional: She is oriented to person, place, and time and well-developed, well-nourished, and in no distress.   HENT:   Head: Normocephalic and atraumatic.   Mouth/Throat: Oropharynx is clear and moist.   Eyes: EOM are normal. Pupils are equal, round, and reactive to light.   Neck: Neck supple. No thyromegaly present.   Cardiovascular: Normal rate, regular rhythm and normal heart sounds.  Exam reveals no gallop and no friction rub.    No murmur heard.  Pulmonary/Chest: Breath sounds normal. She has no wheezes. She has no rales. She exhibits no tenderness.   Abdominal: There is no tenderness. There is no rebound and no guarding.       Right Side Rheumatological Exam     Examination finds the elbow, 1st MCP, 2nd MCP, 3rd MCP, 4th MCP and 5th MCP normal.    The patient is tender to palpation of the shoulder, wrist, knee, 1st PIP, 1st MCP, 2nd PIP, 2nd MCP, 3rd PIP, 3rd MCP, 4th PIP, 4th MCP, 5th PIP and 5th MCP    She has swelling of the knee    The patient has an enlarged wrist, 1st PIP, 2nd PIP, 3rd PIP, 4th PIP and 5th PIP    Shoulder Exam   Tenderness Location: acromion    Range of Motion   Active abduction: abnormal   Adduction: abnormal  Sensation: normal    Knee Exam   Tenderness Location: medial joint line  Patellofemoral Crepitus: positive  Effusion: positive  Sensation: normal    Hip Exam   Tenderness Location: no tenderness  Sensation: normal    Elbow/Wrist Exam   Tenderness Location: no tenderness  Sensation: normal    Left Side Rheumatological  Exam     Examination finds the elbow, knee, 1st MCP, 2nd MCP, 3rd MCP, 4th MCP and 5th MCP normal.    The patient is tender to palpation of the shoulder, elbow, wrist, knee, 1st PIP, 1st MCP, 2nd PIP, 2nd MCP, 3rd PIP, 3rd MCP, 4th PIP, 4th MCP, 5th PIP and 5th MCP.    The patient has an enlarged wrist, 1st PIP, 2nd PIP, 3rd PIP, 4th PIP and 5th PIP.    Shoulder Exam   Tenderness Location: acromion    Range of Motion   Active abduction: abnormal   Sensation: normal    Knee Exam   Tenderness Location: lateral joint line and medial joint line    Patellofemoral Crepitus: positive  Effusion: positive  Sensation: normal    Hip Exam   Tenderness Location: no tenderness  Sensation: normal    Elbow/Wrist Exam   Sensation: normal      Back/Neck Exam   General Inspection   Gait: normal       Tenderness Right paramedian tenderness of the Lower C-Spine and Lower L-Spine.Left paramedian tenderness of the Upper C-Spine and Lower L-Spine.      Lymphadenopathy:     She has no cervical adenopathy.   Neurological: She is alert and oriented to person, place, and time. Gait normal.   Reflex Scores:       Patellar reflexes are 3+ on the right side and 3+ on the left side.  Skin: No rash noted. No erythema. No pallor.     Psoriasis B knee   Psychiatric: Mood, memory, affect and judgment normal.   Musculoskeletal: She exhibits tenderness and deformity. She exhibits no edema.   Wrist B and B hip joints B           Results for orders placed or performed in visit on 06/05/18   Quantiferon Gold TB   Result Value Ref Range    NIL 0.055 See text IU/mL    TB Antigen 0.114 See text IU/mL    TB Antigen - Nil 0.059 See Text IU/mL    Mitogen - Nil >10.000 See text IU/mL    TB Gold Negative    HEPATITIS PANEL, ACUTE   Result Value Ref Range    Hepatitis B Surface Ag Negative     Hep B C IgM Negative     Hep A IgM Negative     Hepatitis C Ab Negative        Assessment:       Encounter Diagnoses   Name Primary?    Rheumatoid arthritis involving both  elbows with negative rheumatoid factor Yes    Lumbar and sacral osteoarthritis     Psoriatic arthritis     Kidney stones     Psoriasis with pustules            Plan:       Rheumatoid arthritis involving both elbows with negative rheumatoid factor    Lumbar and sacral osteoarthritis  -     Discontinue: HYDROcodone-acetaminophen (NORCO) 7.5-325 mg per tablet; Take 1 tablet by mouth every 8 (eight) hours as needed for Pain.  Dispense: 90 tablet; Refill: 0  -     ketorolac injection 60 mg  -     methylPREDNISolone acetate injection 160 mg  -     Discontinue: HYDROcodone-acetaminophen (NORCO) 7.5-325 mg per tablet; Take 1 tablet by mouth every 8 (eight) hours as needed for Pain.  Dispense: 90 tablet; Refill: 0  -     HYDROcodone-acetaminophen (NORCO) 7.5-325 mg per tablet; Take 1 tablet by mouth every 8 (eight) hours as needed for Pain.  Dispense: 90 tablet; Refill: 0  -     CBC auto differential; Future; Expected date: 11/01/2018  -     Comprehensive metabolic panel; Future; Expected date: 11/01/2018  -     Sedimentation rate; Future; Expected date: 11/01/2018  -     C-reactive protein; Future; Expected date: 11/01/2018    Psoriatic arthritis  -     Discontinue: HYDROcodone-acetaminophen (NORCO) 7.5-325 mg per tablet; Take 1 tablet by mouth every 8 (eight) hours as needed for Pain.  Dispense: 90 tablet; Refill: 0  -     ketorolac injection 60 mg  -     methylPREDNISolone acetate injection 160 mg  -     Discontinue: HYDROcodone-acetaminophen (NORCO) 7.5-325 mg per tablet; Take 1 tablet by mouth every 8 (eight) hours as needed for Pain.  Dispense: 90 tablet; Refill: 0  -     HYDROcodone-acetaminophen (NORCO) 7.5-325 mg per tablet; Take 1 tablet by mouth every 8 (eight) hours as needed for Pain.  Dispense: 90 tablet; Refill: 0  -     CBC auto differential; Future; Expected date: 11/01/2018  -     Comprehensive metabolic panel; Future; Expected date: 11/01/2018  -     Sedimentation rate; Future; Expected date:  11/01/2018  -     C-reactive protein; Future; Expected date: 11/01/2018    Kidney stones  -     Discontinue: HYDROcodone-acetaminophen (NORCO) 7.5-325 mg per tablet; Take 1 tablet by mouth every 8 (eight) hours as needed for Pain.  Dispense: 90 tablet; Refill: 0  -     ketorolac injection 60 mg  -     methylPREDNISolone acetate injection 160 mg  -     Discontinue: HYDROcodone-acetaminophen (NORCO) 7.5-325 mg per tablet; Take 1 tablet by mouth every 8 (eight) hours as needed for Pain.  Dispense: 90 tablet; Refill: 0  -     HYDROcodone-acetaminophen (NORCO) 7.5-325 mg per tablet; Take 1 tablet by mouth every 8 (eight) hours as needed for Pain.  Dispense: 90 tablet; Refill: 0  -     CBC auto differential; Future; Expected date: 11/01/2018  -     Comprehensive metabolic panel; Future; Expected date: 11/01/2018  -     Sedimentation rate; Future; Expected date: 11/01/2018  -     C-reactive protein; Future; Expected date: 11/01/2018    Psoriasis with pustules  -     Discontinue: HYDROcodone-acetaminophen (NORCO) 7.5-325 mg per tablet; Take 1 tablet by mouth every 8 (eight) hours as needed for Pain.  Dispense: 90 tablet; Refill: 0  -     ketorolac injection 60 mg  -     methylPREDNISolone acetate injection 160 mg  -     Discontinue: HYDROcodone-acetaminophen (NORCO) 7.5-325 mg per tablet; Take 1 tablet by mouth every 8 (eight) hours as needed for Pain.  Dispense: 90 tablet; Refill: 0  -     HYDROcodone-acetaminophen (NORCO) 7.5-325 mg per tablet; Take 1 tablet by mouth every 8 (eight) hours as needed for Pain.  Dispense: 90 tablet; Refill: 0  -     CBC auto differential; Future; Expected date: 11/01/2018  -     Comprehensive metabolic panel; Future; Expected date: 11/01/2018  -     Sedimentation rate; Future; Expected date: 11/01/2018  -     C-reactive protein; Future; Expected date: 11/01/2018      We will restart Humira

## 2018-11-01 NOTE — PROGRESS NOTES
Administered 2 cc ( 80 mg/ml ) of depomedrol to the right upper outer gluteal. Informed of s/s to report verbalized understanding. No adverse reactions noted.    Lot # [id number]  Expiration 11/19    Administered 2 cc ( 30 mg/ml ) of toradol to the left upper outer gluteal. Informed of s/s to report verbalized understanding. No adverse reactions noted.    Lot # [id number]  Expiration 1 dec 2019

## 2018-11-02 ENCOUNTER — TELEPHONE (OUTPATIENT)
Dept: RHEUMATOLOGY | Facility: CLINIC | Age: 48
End: 2018-11-02

## 2018-11-02 NOTE — TELEPHONE ENCOUNTER
Attempted to reach patient gastro doctor regarding notes from last colonoscopy. Left message to contact office with fax number to sen release of information to.

## 2018-11-24 DIAGNOSIS — M47.817 LUMBAR AND SACRAL OSTEOARTHRITIS: ICD-10-CM

## 2018-11-24 DIAGNOSIS — M06.9 RHEUMATOID ARTHRITIS: ICD-10-CM

## 2018-11-26 RX ORDER — TRAZODONE HYDROCHLORIDE 100 MG/1
TABLET ORAL
Qty: 30 TABLET | Refills: 3 | Status: SHIPPED | OUTPATIENT
Start: 2018-11-26 | End: 2019-03-08 | Stop reason: SDUPTHER

## 2019-01-30 ENCOUNTER — PATIENT MESSAGE (OUTPATIENT)
Dept: RHEUMATOLOGY | Facility: CLINIC | Age: 49
End: 2019-01-30

## 2019-01-30 DIAGNOSIS — L40.50 PSORIATIC ARTHRITIS: Primary | ICD-10-CM

## 2019-01-31 ENCOUNTER — TELEPHONE (OUTPATIENT)
Dept: RHEUMATOLOGY | Facility: CLINIC | Age: 49
End: 2019-01-31

## 2019-01-31 NOTE — TELEPHONE ENCOUNTER
----- Message from Dora Johnson sent at 1/31/2019  8:37 AM CST -----  Contact: Augustina with [facility]  Augustina with [facility]  pharmacy [phone] option [phone] called regarding Humira for above patient. They are requesting one of the following: prior authorization.Please call patient's insurance company at [phone] Thanks!

## 2019-02-04 ENCOUNTER — TELEPHONE (OUTPATIENT)
Dept: PHARMACY | Facility: CLINIC | Age: 49
End: 2019-02-04

## 2019-02-04 ENCOUNTER — PATIENT MESSAGE (OUTPATIENT)
Dept: RHEUMATOLOGY | Facility: CLINIC | Age: 49
End: 2019-02-04

## 2019-02-05 ENCOUNTER — PATIENT MESSAGE (OUTPATIENT)
Dept: RHEUMATOLOGY | Facility: CLINIC | Age: 49
End: 2019-02-05

## 2019-02-06 ENCOUNTER — TELEPHONE (OUTPATIENT)
Dept: RHEUMATOLOGY | Facility: CLINIC | Age: 49
End: 2019-02-06

## 2019-02-06 NOTE — TELEPHONE ENCOUNTER
----- Message from Eddie Anguiano sent at 2/6/2019 12:44 PM CST -----  Contact: [facility]  Type:  Pharmacy Calling to Clarify an RX    Pharmacy Name:  [facility]  Prescription Name:  adalimumab (HUMIRA,CF, PEN) 40 mg/0.4 mL PnKt  What do they need to clarify?:  Needs PA completed  Best Call Back Number:  [phone] opt [phone]   Invoice ID: [id number]  Order has been out longer than 7 days. If no response is received in the next 48 business hours order will be placed on hold

## 2019-02-13 DIAGNOSIS — L40.50 PSORIATIC ARTHRITIS: ICD-10-CM

## 2019-02-13 DIAGNOSIS — L40.8 PSORIASIS WITH PUSTULES: ICD-10-CM

## 2019-02-13 DIAGNOSIS — M47.817 LUMBAR AND SACRAL OSTEOARTHRITIS: ICD-10-CM

## 2019-02-13 DIAGNOSIS — N20.0 KIDNEY STONES: ICD-10-CM

## 2019-02-13 RX ORDER — HYDROCODONE BITARTRATE AND ACETAMINOPHEN 7.5; 325 MG/1; MG/1
1 TABLET ORAL EVERY 8 HOURS PRN
Qty: 90 TABLET | Refills: 0 | Status: SHIPPED | OUTPATIENT
Start: 2019-02-13 | End: 2019-03-08 | Stop reason: SDUPTHER

## 2019-02-13 NOTE — TELEPHONE ENCOUNTER
----- Message from Balbina Carrillo sent at 2019 11:11 AM CST -----  Type: Needs Medical Advice    Who Called:  Patient    Best Call Back Number: [phone] (home)     Additional Information: Checking status of Humira pa and also her pain medication , wanted to know if she can bring hard copy in for replacement Rx

## 2019-02-13 NOTE — TELEPHONE ENCOUNTER
----- Message from Balbina Carrillo sent at 2019 11:11 AM CST -----  Type: Needs Medical Advice    Who Called:  Patient    Best Call Back Number: [phone] (home)     Additional Information: Checking status of Humira pa and also her pain medication , wanted to know if she can bring hard copy in for replacement Rx

## 2019-02-13 NOTE — TELEPHONE ENCOUNTER
----- Message from Doug Matamoros sent at 2/13/2019  9:26 AM CST -----  Contact: Monse  Type: Needs Medical Advice    Who Called:  Monse with [facility]  Symptoms (please be specific):    How long has patient had these symptoms:    Pharmacy name and phone #:    Best Call Back Number: [phone] option 3 ref#[phone]  Additional Information: requesting a call back regarding prior auth for patient's medication,stated medication can't be sent to patient without authorization.place call to pod

## 2019-02-14 DIAGNOSIS — L40.50 PSORIATIC ARTHRITIS: ICD-10-CM

## 2019-02-14 RX ORDER — ADALIMUMAB 40MG/0.8ML
40 KIT SUBCUTANEOUS
Qty: 2 PEN | Refills: 11 | Status: CANCELLED | OUTPATIENT
Start: 2019-02-14 | End: 2019-03-16

## 2019-02-14 NOTE — TELEPHONE ENCOUNTER
**The following message was sent to MD and staff via staff message on 02.14.2019 @ 8:14am **      Dr. Valdez and Staff,    Morgan LENZ prior authorization has been approved through 05.13.2019.     Insurance requires the patient to fill specialty medications through [facility]. Directions are below.     The patient has been provided with the necessary information to schedule a delivery.          To complete this in EPIC  1. The original order MUST be discontinued and re-typed as a new prescription with the updated pharmacy listed.     2. I have added [facility] to the patient's preferred pharmacies.      3. Uncheck the box that says [facility] AND Check box with [facility] (pharmacy)    *do not  click reorder -- as it will continue to route the rx to [facility] even if the pharmacy is changed.     Please opt the patient out of [facility] when the BPA is fired.     Thank You,  Ellen Ford, [facility]  Patient Care Advocate   [facility]   Direct ext: [phone]   Fax: [phone]   [email or web address]

## 2019-02-14 NOTE — TELEPHONE ENCOUNTER
Documentation Only:    Prior Authorization for Humira CF has been approved until 05.13.2019    Case ID#: [id number]    Patient co-pay: unknown -- must fill at [facility]    Patient Assistance may be required.    Patient has been enrolled in Humira copay card program which will reduce patient copay to $5. Copay card info is listed below.    Humira Copay Card  RxBIN: [id number]  RxPCN: [id number]  RxGRP: [id number]  ID: [id number]    Will notify MD of need to transfer to [facility] -- patient already aware.    AKSTEFFANIE 8:21am

## 2019-02-14 NOTE — TELEPHONE ENCOUNTER
"----- Message from Ellen Ford sent at 2/14/2019  8:23 AM CST -----  Regarding: Humira  Transfer  Dr. Valdez and Staff,    Morgan LENZ prior authorization has been approved through 05.13.2019.     Insurance requires the patient to fill specialty medications through [facility]. Directions are below.     The patient has been provided with the necessary information to schedule a delivery.          To complete this in EPIC  1. The original order MUST be discontinued and re-typed as a new prescription with the updated pharmacy listed.     2. I have added [facility] to the patient's preferred pharmacies.      3. Uncheck the box that says [facility] AND Check box with [facility] (pharmacy)    #do not  click "reorder" -- as it will continue to route the rx [facility] even if the pharmacy is changed.     Please opt the patient [facility] when the BPA is fired.     Thank You,  Ellen Ford, [facility]  Patient Care Advocate   [facility]   Direct ext: [phone]   Fax: [phone]   [email or web address]          "

## 2019-02-15 ENCOUNTER — PATIENT MESSAGE (OUTPATIENT)
Dept: ADMINISTRATIVE | Facility: OTHER | Age: 49
End: 2019-02-15

## 2019-03-01 ENCOUNTER — LAB VISIT (OUTPATIENT)
Dept: LAB | Facility: HOSPITAL | Age: 49
End: 2019-03-01
Attending: INTERNAL MEDICINE
Payer: COMMERCIAL

## 2019-03-01 DIAGNOSIS — N20.0 KIDNEY STONES: ICD-10-CM

## 2019-03-01 DIAGNOSIS — L40.50 PSORIATIC ARTHRITIS: ICD-10-CM

## 2019-03-01 DIAGNOSIS — M47.817 LUMBAR AND SACRAL OSTEOARTHRITIS: ICD-10-CM

## 2019-03-01 DIAGNOSIS — L40.8 PSORIASIS WITH PUSTULES: ICD-10-CM

## 2019-03-01 LAB
ALBUMIN SERPL BCP-MCNC: 3.9 G/DL
ALP SERPL-CCNC: 60 U/L
ALT SERPL W/O P-5'-P-CCNC: 12 U/L
ANION GAP SERPL CALC-SCNC: 8 MMOL/L
AST SERPL-CCNC: 17 U/L
BASOPHILS # BLD AUTO: 0.03 K/UL
BASOPHILS NFR BLD: 0.6 %
BILIRUB SERPL-MCNC: 0.8 MG/DL
BUN SERPL-MCNC: 13 MG/DL
CALCIUM SERPL-MCNC: 9.3 MG/DL
CHLORIDE SERPL-SCNC: 103 MMOL/L
CO2 SERPL-SCNC: 27 MMOL/L
CREAT SERPL-MCNC: 0.9 MG/DL
CRP SERPL-MCNC: 0.3 MG/L
DIFFERENTIAL METHOD: NORMAL
EOSINOPHIL # BLD AUTO: 0.1 K/UL
EOSINOPHIL NFR BLD: 1.9 %
ERYTHROCYTE [DISTWIDTH] IN BLOOD BY AUTOMATED COUNT: 13.3 %
ERYTHROCYTE [SEDIMENTATION RATE] IN BLOOD BY WESTERGREN METHOD: 8 MM/HR
EST. GFR  (AFRICAN AMERICAN): >60 ML/MIN/1.73 M^2
EST. GFR  (NON AFRICAN AMERICAN): >60 ML/MIN/1.73 M^2
GLUCOSE SERPL-MCNC: 94 MG/DL
HCT VFR BLD AUTO: 39.7 %
HGB BLD-MCNC: 12.7 G/DL
IMM GRANULOCYTES # BLD AUTO: 0.01 K/UL
IMM GRANULOCYTES NFR BLD AUTO: 0.2 %
LYMPHOCYTES # BLD AUTO: 2.1 K/UL
LYMPHOCYTES NFR BLD: 40.9 %
MCH RBC QN AUTO: 30.6 PG
MCHC RBC AUTO-ENTMCNC: 32 G/DL
MCV RBC AUTO: 96 FL
MONOCYTES # BLD AUTO: 0.3 K/UL
MONOCYTES NFR BLD: 6.4 %
NEUTROPHILS # BLD AUTO: 2.6 K/UL
NEUTROPHILS NFR BLD: 50 %
NRBC BLD-RTO: 0 /100 WBC
PLATELET # BLD AUTO: 245 K/UL
PMV BLD AUTO: 11.2 FL
POTASSIUM SERPL-SCNC: 3.9 MMOL/L
PROT SERPL-MCNC: 6.9 G/DL
RBC # BLD AUTO: 4.15 M/UL
SODIUM SERPL-SCNC: 138 MMOL/L
WBC # BLD AUTO: 5.16 K/UL

## 2019-03-01 PROCEDURE — 85651 RBC SED RATE NONAUTOMATED: CPT | Mod: PO

## 2019-03-01 PROCEDURE — 36415 COLL VENOUS BLD VENIPUNCTURE: CPT | Mod: PO

## 2019-03-01 PROCEDURE — 86140 C-REACTIVE PROTEIN: CPT

## 2019-03-01 PROCEDURE — 85025 COMPLETE CBC W/AUTO DIFF WBC: CPT

## 2019-03-01 PROCEDURE — 80053 COMPREHEN METABOLIC PANEL: CPT

## 2019-03-08 ENCOUNTER — OFFICE VISIT (OUTPATIENT)
Dept: RHEUMATOLOGY | Facility: CLINIC | Age: 49
End: 2019-03-08
Payer: COMMERCIAL

## 2019-03-08 VITALS
DIASTOLIC BLOOD PRESSURE: 72 MMHG | SYSTOLIC BLOOD PRESSURE: 132 MMHG | HEIGHT: 60 IN | BODY MASS INDEX: 24.11 KG/M2 | WEIGHT: 122.81 LBS | HEART RATE: 77 BPM

## 2019-03-08 DIAGNOSIS — L40.50 PSORIATIC ARTHRITIS: ICD-10-CM

## 2019-03-08 DIAGNOSIS — F41.1 GENERALIZED ANXIETY DISORDER: ICD-10-CM

## 2019-03-08 DIAGNOSIS — H20.9 UVEITIS: ICD-10-CM

## 2019-03-08 DIAGNOSIS — G47.00 INSOMNIA, UNSPECIFIED TYPE: ICD-10-CM

## 2019-03-08 DIAGNOSIS — M47.817 LUMBAR AND SACRAL OSTEOARTHRITIS: ICD-10-CM

## 2019-03-08 DIAGNOSIS — M06.00 SERONEGATIVE RHEUMATOID ARTHRITIS: Primary | ICD-10-CM

## 2019-03-08 DIAGNOSIS — J32.9 SINUSITIS, UNSPECIFIED CHRONICITY, UNSPECIFIED LOCATION: ICD-10-CM

## 2019-03-08 DIAGNOSIS — K51.919 ULCERATIVE COLITIS WITH COMPLICATION, UNSPECIFIED LOCATION: ICD-10-CM

## 2019-03-08 DIAGNOSIS — B02.9 HERPES ZOSTER WITHOUT COMPLICATION: ICD-10-CM

## 2019-03-08 PROCEDURE — 3008F BODY MASS INDEX DOCD: CPT | Mod: CPTII,S$GLB,, | Performed by: PHYSICIAN ASSISTANT

## 2019-03-08 PROCEDURE — 96372 PR INJECTION,THERAP/PROPH/DIAG2ST, IM OR SUBCUT: ICD-10-PCS | Mod: S$GLB,,, | Performed by: PHYSICIAN ASSISTANT

## 2019-03-08 PROCEDURE — 96372 THER/PROPH/DIAG INJ SC/IM: CPT | Mod: S$GLB,,, | Performed by: PHYSICIAN ASSISTANT

## 2019-03-08 PROCEDURE — 3008F PR BODY MASS INDEX (BMI) DOCUMENTED: ICD-10-PCS | Mod: CPTII,S$GLB,, | Performed by: PHYSICIAN ASSISTANT

## 2019-03-08 PROCEDURE — 99999 PR PBB SHADOW E&M-EST. PATIENT-LVL III: ICD-10-PCS | Mod: PBBFAC,,, | Performed by: PHYSICIAN ASSISTANT

## 2019-03-08 PROCEDURE — 99214 OFFICE O/P EST MOD 30 MIN: CPT | Mod: 25,S$GLB,, | Performed by: PHYSICIAN ASSISTANT

## 2019-03-08 PROCEDURE — 99214 PR OFFICE/OUTPT VISIT, EST, LEVL IV, 30-39 MIN: ICD-10-PCS | Mod: 25,S$GLB,, | Performed by: PHYSICIAN ASSISTANT

## 2019-03-08 PROCEDURE — 99999 PR PBB SHADOW E&M-EST. PATIENT-LVL III: CPT | Mod: PBBFAC,,, | Performed by: PHYSICIAN ASSISTANT

## 2019-03-08 RX ORDER — KETOROLAC TROMETHAMINE 30 MG/ML
60 INJECTION, SOLUTION INTRAMUSCULAR; INTRAVENOUS
Status: COMPLETED | OUTPATIENT
Start: 2019-03-08 | End: 2019-03-08

## 2019-03-08 RX ORDER — METHYLPREDNISOLONE ACETATE 80 MG/ML
160 INJECTION, SUSPENSION INTRA-ARTICULAR; INTRALESIONAL; INTRAMUSCULAR; SOFT TISSUE
Status: COMPLETED | OUTPATIENT
Start: 2019-03-08 | End: 2019-03-08

## 2019-03-08 RX ORDER — AMOXICILLIN AND CLAVULANATE POTASSIUM 875; 125 MG/1; MG/1
1 TABLET, FILM COATED ORAL 2 TIMES DAILY
Qty: 20 TABLET | Refills: 0 | Status: SHIPPED | OUTPATIENT
Start: 2019-03-08 | End: 2019-03-18

## 2019-03-08 RX ORDER — TRAZODONE HYDROCHLORIDE 100 MG/1
TABLET ORAL
Qty: 90 TABLET | Refills: 3 | Status: SHIPPED | OUTPATIENT
Start: 2019-03-08 | End: 2020-05-07 | Stop reason: SDUPTHER

## 2019-03-08 RX ORDER — HYDROCODONE BITARTRATE AND ACETAMINOPHEN 7.5; 325 MG/1; MG/1
1 TABLET ORAL EVERY 8 HOURS PRN
Qty: 90 TABLET | Refills: 0 | Status: SHIPPED | OUTPATIENT
Start: 2019-05-08 | End: 2019-05-17 | Stop reason: SDUPTHER

## 2019-03-08 RX ORDER — ESCITALOPRAM OXALATE 10 MG/1
10 TABLET ORAL DAILY
Qty: 90 TABLET | Refills: 3 | Status: SHIPPED | OUTPATIENT
Start: 2019-03-08 | End: 2020-04-03 | Stop reason: SDUPTHER

## 2019-03-08 RX ORDER — HYDROCODONE BITARTRATE AND ACETAMINOPHEN 7.5; 325 MG/1; MG/1
1 TABLET ORAL EVERY 8 HOURS PRN
Qty: 90 TABLET | Refills: 0 | Status: SHIPPED | OUTPATIENT
Start: 2019-03-08 | End: 2019-03-08 | Stop reason: SDUPTHER

## 2019-03-08 RX ORDER — VALACYCLOVIR HYDROCHLORIDE 1 G/1
1000 TABLET, FILM COATED ORAL 3 TIMES DAILY
Qty: 21 TABLET | Refills: 0 | Status: SHIPPED | OUTPATIENT
Start: 2019-03-08 | End: 2019-03-15

## 2019-03-08 RX ORDER — HYDROCODONE BITARTRATE AND ACETAMINOPHEN 7.5; 325 MG/1; MG/1
1 TABLET ORAL EVERY 8 HOURS PRN
Qty: 90 TABLET | Refills: 0 | Status: SHIPPED | OUTPATIENT
Start: 2019-04-08 | End: 2019-03-08 | Stop reason: SDUPTHER

## 2019-03-08 RX ADMIN — METHYLPREDNISOLONE ACETATE 160 MG: 80 INJECTION, SUSPENSION INTRA-ARTICULAR; INTRALESIONAL; INTRAMUSCULAR; SOFT TISSUE at 11:03

## 2019-03-08 RX ADMIN — KETOROLAC TROMETHAMINE 60 MG: 30 INJECTION, SOLUTION INTRAMUSCULAR; INTRAVENOUS at 11:03

## 2019-03-08 NOTE — PROGRESS NOTES
Subjective:       Patient ID: Osiris Billingsley is a 48 y.o. female.    Chief Complaint: No chief complaint on file.    HPI  Review of Systems      Objective:   There were no vitals filed for this visit.    Past Medical History:   Diagnosis Date    Colitis     Dry eyes     Dry mouth     Neuromuscular disorder     Premature menopause     Rheumatoid arthritis      Past Surgical History:   Procedure Laterality Date     SECTION            Physical Exam      Recent labs:  Component      Latest Ref Rng & Units 3/1/2019 2018   WBC      3.90 - 12.70 K/uL 5.16    RBC      4.00 - 5.40 M/uL 4.15    Hemoglobin      12.0 - 16.0 g/dL 12.7    Hematocrit      37.0 - 48.5 % 39.7    MCV      82 - 98 fL 96    MCH      27.0 - 31.0 pg 30.6    MCHC      32.0 - 36.0 g/dL 32.0    RDW      11.5 - 14.5 % 13.3    Platelets      150 - 350 K/uL 245    MPV      9.2 - 12.9 fL 11.2    Immature Granulocytes      0.0 - 0.5 % 0.2    Gran # (ANC)      1.8 - 7.7 K/uL 2.6    Immature Grans (Abs)      0.00 - 0.04 K/uL 0.01    Lymph #      1.0 - 4.8 K/uL 2.1    Mono #      0.3 - 1.0 K/uL 0.3    Eos #      0.0 - 0.5 K/uL 0.1    Baso #      0.00 - 0.20 K/uL 0.03    nRBC      0 /100 WBC 0    Gran%      38.0 - 73.0 % 50.0    Lymph%      18.0 - 48.0 % 40.9    Mono%      4.0 - 15.0 % 6.4    Eosinophil%      0.0 - 8.0 % 1.9    Basophil%      0.0 - 1.9 % 0.6    Differential Method       Automated    Sodium      136 - 145 mmol/L 138    Potassium      3.5 - 5.1 mmol/L 3.9    Chloride      95 - 110 mmol/L 103    CO2      23 - 29 mmol/L 27    Glucose      70 - 110 mg/dL 94    BUN, Bld      6 - 20 mg/dL 13    Creatinine      0.5 - 1.4 mg/dL 0.9    Calcium      8.7 - 10.5 mg/dL 9.3    Total Protein      6.0 - 8.4 g/dL 6.9    Albumin      3.5 - 5.2 g/dL 3.9    Total Bilirubin      0.1 - 1.0 mg/dL 0.8    Alkaline Phosphatase      55 - 135 U/L 60    AST      10 - 40 U/L 17    ALT      10 - 44 U/L 12    Anion Gap      8 - 16 mmol/L 8    eGFR if  African American      >60 mL/min/1.73 m:2 >60.0    eGFR if non African American      >60 mL/min/1.73 m:2 >60.0    NIL      See text IU/mL  0.055   TB Antigen      See text IU/mL  0.114   TB Antigen - Nil      See Text IU/mL  0.059   Mitogen - Nil      See text IU/mL  >10.000   TB Gold        Negative   Hepatitis B Surface Ag        Negative   Hep B C IgM        Negative   Hep A IgM        Negative   Hepatitis C Ab        Negative   Sed Rate      0 - 20 mm/Hr 8    CRP      0.0 - 8.2 mg/L 0.3      Assessment:       No diagnosis found.        Plan:       There are no diagnoses linked to this encounter.

## 2019-03-08 NOTE — PROGRESS NOTES
Subjective:       Patient ID: Osiris Billingsley is a 48 y.o. female.    Chief Complaint: Rheumatoid Arthritis (4 month)    Mrs. Billingsley is a 48 year old female who presents to clinic for follow up on rheumatoid arthritis. She is a new patient to me. She has not been doing well since her last visit. 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 She complains of R hand numbness, increasing swelling in her hands as well as L midfoot swelling and pain. She has new rash on the face that is unchanged and evolved into blister like lesions. She has experience once episode of uveitis since her last visit as well. No recent psoriasis. She complains of sore throat, post-nasal drip, sinus pressure/congestion x 7 days with no improvement.       Review of Systems   Constitutional: Negative for activity change, appetite change, chills, fatigue and fever.   HENT: Positive for postnasal drip, rhinorrhea, sinus pressure, sinus pain and sore throat.    Eyes: Negative for visual disturbance.   Respiratory: Negative for cough and shortness of breath.    Cardiovascular: Negative for chest pain, palpitations and leg swelling.   Gastrointestinal: Negative for abdominal pain, constipation, diarrhea, nausea and vomiting.   Musculoskeletal: Positive for arthralgias and joint swelling.   Skin: Positive for rash.   Allergic/Immunologic: Positive for immunocompromised state.   Neurological: Negative for dizziness, weakness, light-headedness and headaches.         Objective:     Vitals:    19 1033   BP: 132/72   Pulse: 77       Past Medical History:   Diagnosis Date    Colitis     Dry eyes     Dry mouth     Neuromuscular disorder     Premature menopause     Rheumatoid arthritis      Past Surgical History:   Procedure Laterality Date     SECTION            Physical Exam   Constitutional: She is well-developed, well-nourished, and in no distress.   Eyes: Right conjunctiva is not injected. Left  conjunctiva is not injected. Right eye exhibits normal extraocular motion. Left eye exhibits normal extraocular motion.   Neck: No JVD present. No thyromegaly present.   Cardiovascular: Normal rate and regular rhythm.  Exam reveals no decreased pulses.    Pulmonary/Chest: She has no wheezes. She has no rhonchi. She has no rales.       Right Side Rheumatological Exam     Examination finds the shoulder, elbow, knee, 2nd PIP, 3rd PIP, 4th PIP and 5th PIP normal.    The patient is tender to palpation of the wrist, 1st MCP, 2nd MCP, 3rd MCP, 4th MCP and 5th MCP    Left Side Rheumatological Exam     Examination finds the shoulder, elbow, knee, 1st PIP, 1st MCP, 2nd PIP, 2nd MCP, 3rd PIP, 3rd MCP, 4th PIP, 4th MCP, 5th PIP and 5th MCP normal.    The patient is tender to palpation of the wrist.      Lymphadenopathy:     She has no cervical adenopathy.   Neurological: Gait normal.   Skin: Rash (scabbed lesions on the chin and at the nose) noted.     Psychiatric: Mood and affect normal.   Musculoskeletal: She exhibits tenderness (L midfoot).      Recent labs:  Component      Latest Ref Rng & Units 3/1/2019   WBC      3.90 - 12.70 K/uL 5.16   RBC      4.00 - 5.40 M/uL 4.15   Hemoglobin      12.0 - 16.0 g/dL 12.7   Hematocrit      37.0 - 48.5 % 39.7   MCV      82 - 98 fL 96   MCH      27.0 - 31.0 pg 30.6   MCHC      32.0 - 36.0 g/dL 32.0   RDW      11.5 - 14.5 % 13.3   Platelets      150 - 350 K/uL 245   MPV      9.2 - 12.9 fL 11.2   Immature Granulocytes      0.0 - 0.5 % 0.2   Gran # (ANC)      1.8 - 7.7 K/uL 2.6   Immature Grans (Abs)      0.00 - 0.04 K/uL 0.01   Lymph #      1.0 - 4.8 K/uL 2.1   Mono #      0.3 - 1.0 K/uL 0.3   Eos #      0.0 - 0.5 K/uL 0.1   Baso #      0.00 - 0.20 K/uL 0.03   nRBC      0 /100 WBC 0   Gran%      38.0 - 73.0 % 50.0   Lymph%      18.0 - 48.0 % 40.9   Mono%      4.0 - 15.0 % 6.4   Eosinophil%      0.0 - 8.0 % 1.9   Basophil%      0.0 - 1.9 % 0.6   Differential Method       Automated   Sodium       136 - 145 mmol/L 138   Potassium      3.5 - 5.1 mmol/L 3.9   Chloride      95 - 110 mmol/L 103   CO2      23 - 29 mmol/L 27   Glucose      70 - 110 mg/dL 94   BUN, Bld      6 - 20 mg/dL 13   Creatinine      0.5 - 1.4 mg/dL 0.9   Calcium      8.7 - 10.5 mg/dL 9.3   Total Protein      6.0 - 8.4 g/dL 6.9   Albumin      3.5 - 5.2 g/dL 3.9   Total Bilirubin      0.1 - 1.0 mg/dL 0.8   Alkaline Phosphatase      55 - 135 U/L 60   AST      10 - 40 U/L 17   ALT      10 - 44 U/L 12   Anion Gap      8 - 16 mmol/L 8   eGFR if African American      >60 mL/min/1.73 m:2 >60.0   eGFR if non African American      >60 mL/min/1.73 m:2 >60.0   Sed Rate      0 - 20 mm/Hr 8   CRP      0.0 - 8.2 mg/L 0.3     Assessment:       1. Seronegative rheumatoid arthritis    2. Psoriatic arthritis    3. Insomnia, unspecified type    4. Herpes zoster without complication    5. Sinusitis, unspecified chronicity, unspecified location    6. Generalized anxiety disorder    7. Uveitis    8. Ulcerative colitis with complication, unspecified location    9. Lumbar and sacral osteoarthritis            Plan:       Seronegative rheumatoid arthritis  -     ketorolac injection 60 mg  -     methylPREDNISolone acetate injection 160 mg    Psoriatic arthritis  -     ketorolac injection 60 mg  -     methylPREDNISolone acetate injection 160 mg  -     Discontinue: HYDROcodone-acetaminophen (NORCO) 7.5-325 mg per tablet; Take 1 tablet by mouth every 8 (eight) hours as needed for Pain.  Dispense: 90 tablet; Refill: 0  -     Discontinue: HYDROcodone-acetaminophen (NORCO) 7.5-325 mg per tablet; Take 1 tablet by mouth every 8 (eight) hours as needed for Pain.  Dispense: 90 tablet; Refill: 0  -     HYDROcodone-acetaminophen (NORCO) 7.5-325 mg per tablet; Take 1 tablet by mouth every 8 (eight) hours as needed for Pain.  Dispense: 90 tablet; Refill: 0    Insomnia, unspecified type  -     traZODone (DESYREL) 100 MG tablet; TAKE 1 TABLET(100 MG) BY MOUTH EVERY EVENING   Dispense: 90 tablet; Refill: 3    Herpes zoster without complication  -     valACYclovir (VALTREX) 1000 MG tablet; Take 1 tablet (1,000 mg total) by mouth 3 (three) times daily. for 7 days  Dispense: 21 tablet; Refill: 0    Sinusitis, unspecified chronicity, unspecified location  -     amoxicillin-clavulanate 875-125mg (AUGMENTIN) 875-125 mg per tablet; Take 1 tablet by mouth 2 (two) times daily. for 10 days  Dispense: 20 tablet; Refill: 0    Generalized anxiety disorder  -     escitalopram oxalate (LEXAPRO) 10 MG tablet; Take 1 tablet (10 mg total) by mouth once daily.  Dispense: 90 tablet; Refill: 3    Uveitis    Ulcerative colitis with complication, unspecified location    Lumbar and sacral osteoarthritis  -     Discontinue: HYDROcodone-acetaminophen (NORCO) 7.5-325 mg per tablet; Take 1 tablet by mouth every 8 (eight) hours as needed for Pain.  Dispense: 90 tablet; Refill: 0  -     Discontinue: HYDROcodone-acetaminophen (NORCO) 7.5-325 mg per tablet; Take 1 tablet by mouth every 8 (eight) hours as needed for Pain.  Dispense: 90 tablet; Refill: 0  -     HYDROcodone-acetaminophen (NORCO) 7.5-325 mg per tablet; Take 1 tablet by mouth every 8 (eight) hours as needed for Pain.  Dispense: 90 tablet; Refill: 0        Assessment:  48 year old female with  Seronegative rheumatoid arthritis, psoriatic arthritis, psoriasis, ulcerative colitis, and uveitis  --history of ARLIN 1:160 homogenous    Plan:  1. Treat current infection: start augmentin BID x 10 days if sinus symptoms persist. Start valtrex for possible shingles.   2. Start Humira SC every 14 days once infection has resolved  3. Toradol 60 mg, depomedrol 160 mg IM given today in clinic  4. Norco TID PRN. Pain medication refilled.    Follow up: 2 months with Dr. Devi as scheduled

## 2019-03-08 NOTE — PROGRESS NOTES
Administered 2 cc DepoMedrol 80mg/cc  to left upper outer gluteal. Pt tolerated well. No acute reaction noted to site. Pt instructed on S/S to report. Advised patient to wait in lobby 15 minutes after receiving injection to monitor for any reactions..  Pt verbalized understanding.     Lot: [id number]  Exp: 03/20  Administered 2 cc  Toradol 30mg/cc  to right upper outer gluteal. Pt tolerated well. No acute reaction noted to site. Pt instructed on S/S to report. Advised patient to wait in lobby 15 minutes after receiving injection to monitor for any reactions. Pt verbalized understanding.     Lot: -[id number]  Exp: [id number]

## 2019-05-17 ENCOUNTER — OFFICE VISIT (OUTPATIENT)
Dept: RHEUMATOLOGY | Facility: CLINIC | Age: 49
End: 2019-05-17
Payer: COMMERCIAL

## 2019-05-17 VITALS
HEART RATE: 70 BPM | DIASTOLIC BLOOD PRESSURE: 74 MMHG | HEIGHT: 60 IN | BODY MASS INDEX: 22.78 KG/M2 | WEIGHT: 116 LBS | SYSTOLIC BLOOD PRESSURE: 125 MMHG

## 2019-05-17 DIAGNOSIS — H20.9 UVEITIS: ICD-10-CM

## 2019-05-17 DIAGNOSIS — K21.00 GERD WITH ESOPHAGITIS: ICD-10-CM

## 2019-05-17 DIAGNOSIS — K51.919 ULCERATIVE COLITIS WITH COMPLICATION, UNSPECIFIED LOCATION: ICD-10-CM

## 2019-05-17 DIAGNOSIS — M06.00 SERONEGATIVE RHEUMATOID ARTHRITIS: ICD-10-CM

## 2019-05-17 DIAGNOSIS — L40.50 PSORIATIC ARTHRITIS: Primary | ICD-10-CM

## 2019-05-17 DIAGNOSIS — M47.817 LUMBAR AND SACRAL OSTEOARTHRITIS: ICD-10-CM

## 2019-05-17 DIAGNOSIS — K25.9 GASTRIC ULCER, UNSPECIFIED CHRONICITY, UNSPECIFIED WHETHER GASTRIC ULCER HEMORRHAGE OR PERFORATION PRESENT: ICD-10-CM

## 2019-05-17 PROCEDURE — 99999 PR PBB SHADOW E&M-EST. PATIENT-LVL III: ICD-10-PCS | Mod: PBBFAC,,, | Performed by: INTERNAL MEDICINE

## 2019-05-17 PROCEDURE — 99999 PR PBB SHADOW E&M-EST. PATIENT-LVL III: CPT | Mod: PBBFAC,,, | Performed by: INTERNAL MEDICINE

## 2019-05-17 PROCEDURE — 96372 PR INJECTION,THERAP/PROPH/DIAG2ST, IM OR SUBCUT: ICD-10-PCS | Mod: S$GLB,,, | Performed by: INTERNAL MEDICINE

## 2019-05-17 PROCEDURE — 3008F BODY MASS INDEX DOCD: CPT | Mod: CPTII,S$GLB,, | Performed by: INTERNAL MEDICINE

## 2019-05-17 PROCEDURE — 96372 THER/PROPH/DIAG INJ SC/IM: CPT | Mod: S$GLB,,, | Performed by: INTERNAL MEDICINE

## 2019-05-17 PROCEDURE — 3008F PR BODY MASS INDEX (BMI) DOCUMENTED: ICD-10-PCS | Mod: CPTII,S$GLB,, | Performed by: INTERNAL MEDICINE

## 2019-05-17 PROCEDURE — 99214 OFFICE O/P EST MOD 30 MIN: CPT | Mod: 25,S$GLB,, | Performed by: INTERNAL MEDICINE

## 2019-05-17 PROCEDURE — 99214 PR OFFICE/OUTPT VISIT, EST, LEVL IV, 30-39 MIN: ICD-10-PCS | Mod: 25,S$GLB,, | Performed by: INTERNAL MEDICINE

## 2019-05-17 RX ORDER — CYANOCOBALAMIN 1000 UG/ML
1000 INJECTION, SOLUTION INTRAMUSCULAR; SUBCUTANEOUS
Status: COMPLETED | OUTPATIENT
Start: 2019-05-17 | End: 2019-05-17

## 2019-05-17 RX ORDER — ONDANSETRON 4 MG/1
4 TABLET, ORALLY DISINTEGRATING ORAL EVERY 8 HOURS PRN
Qty: 45 TABLET | Refills: 3 | Status: SHIPPED | OUTPATIENT
Start: 2019-05-17 | End: 2019-07-01

## 2019-05-17 RX ORDER — LOTEPREDNOL ETABONATE 5 MG/ML
2 SUSPENSION/ DROPS OPHTHALMIC 4 TIMES DAILY
Qty: 15 ML | Refills: 6 | Status: SHIPPED | OUTPATIENT
Start: 2019-05-17 | End: 2019-06-16

## 2019-05-17 RX ORDER — HYDROCODONE BITARTRATE AND ACETAMINOPHEN 7.5; 325 MG/1; MG/1
1 TABLET ORAL EVERY 8 HOURS PRN
Qty: 90 TABLET | Refills: 0 | Status: SHIPPED | OUTPATIENT
Start: 2019-05-17 | End: 2019-05-17 | Stop reason: SDUPTHER

## 2019-05-17 RX ORDER — SUCRALFATE 1 G/1
1 TABLET ORAL 4 TIMES DAILY
Qty: 120 TABLET | Refills: 3 | Status: SHIPPED | OUTPATIENT
Start: 2019-05-17 | End: 2019-06-16

## 2019-05-17 RX ORDER — HYDROCODONE BITARTRATE AND ACETAMINOPHEN 7.5; 325 MG/1; MG/1
1 TABLET ORAL EVERY 8 HOURS PRN
Qty: 90 TABLET | Refills: 0 | Status: SHIPPED | OUTPATIENT
Start: 2019-06-17 | End: 2019-05-17 | Stop reason: SDUPTHER

## 2019-05-17 RX ORDER — KETOROLAC TROMETHAMINE 30 MG/ML
60 INJECTION, SOLUTION INTRAMUSCULAR; INTRAVENOUS
Status: COMPLETED | OUTPATIENT
Start: 2019-05-17 | End: 2019-05-17

## 2019-05-17 RX ORDER — HYDROCODONE BITARTRATE AND ACETAMINOPHEN 7.5; 325 MG/1; MG/1
1 TABLET ORAL EVERY 8 HOURS PRN
Qty: 90 TABLET | Refills: 0 | Status: SHIPPED | OUTPATIENT
Start: 2019-07-17 | End: 2019-08-16

## 2019-05-17 RX ADMIN — CYANOCOBALAMIN 1000 MCG: 1000 INJECTION, SOLUTION INTRAMUSCULAR; SUBCUTANEOUS at 01:05

## 2019-05-17 RX ADMIN — KETOROLAC TROMETHAMINE 60 MG: 30 INJECTION, SOLUTION INTRAMUSCULAR; INTRAVENOUS at 01:05

## 2019-05-17 NOTE — PROGRESS NOTES
Administered 2 cc  Toradol 30mg/cc  to left dorsogluteal. Pt tolerated well. No acute reaction noted to site. Pt instructed on S/S to report. Advised patient to wait in lobby 15 minutes after receiving injection to monitor for any reactions. Pt verbalized understanding.     Lot: -[id number]  Exp: 1May2020  Administered 1 cc B12 1000mcg to left upper outer gluteal .  Pt tolerated well.No acute reaction noted to site. Pt instructed on S/S to report. Advised pt to waitn in lobby 15 minutes after receiving injection to monitor for any reactions. Pt verbalized understanding.     Lot: [id number]  Exp:[id number]

## 2019-05-17 NOTE — PROGRESS NOTES
Subjective:       Patient ID: Osiris Billingsley is a 48 y.o. female.    Chief Complaint: Rheumatoid Arthritis and Osteoarthritis (lumbar and sacral)    Follow up: 48 year on rheumatoid arthritis.She has new dx of  Celiac and now is biopsy proven 2017, possible starting a clinical study. She complains of R hand numbness, increasing swelling in her hands as well as L midfoot swelling and pain. She has new rash on the face that is unchanged and evolved into blister like lesions. She has experience once episode of uveitis since her last visit as well.     Osteoarthritis   Associated symptoms include arthralgias, joint swelling, myalgias, a rash and a sore throat. Pertinent negatives include no abdominal pain, chest pain, chills, coughing, fatigue, fever, headaches, nausea, vomiting or weakness.     Review of Systems   Constitutional: Negative for activity change, appetite change, chills, fatigue and fever.   HENT: Positive for postnasal drip, rhinorrhea, sinus pressure, sinus pain and sore throat.    Eyes: Negative for visual disturbance.   Respiratory: Negative for cough and shortness of breath.    Cardiovascular: Negative for chest pain, palpitations and leg swelling.   Gastrointestinal: Negative for abdominal pain, constipation, diarrhea, nausea and vomiting.   Musculoskeletal: Positive for arthralgias, back pain, joint swelling and myalgias.   Skin: Positive for rash.   Allergic/Immunologic: Positive for immunocompromised state.   Neurological: Negative for dizziness, weakness, light-headedness and headaches.         Objective:     Vitals:    19 1123   BP: 125/74   Pulse: 70       Past Medical History:   Diagnosis Date    Colitis     Dry eyes     Dry mouth     Neuromuscular disorder     Premature menopause     Rheumatoid arthritis      Past Surgical History:   Procedure Laterality Date     SECTION            Physical Exam   Constitutional: She is well-developed, well-nourished, and in no  distress.   Eyes: Right conjunctiva is not injected. Left conjunctiva is not injected. Right eye exhibits normal extraocular motion. Left eye exhibits normal extraocular motion.   Neck: No JVD present. No thyromegaly present.   Cardiovascular: Normal rate and regular rhythm.  Exam reveals no decreased pulses.    Pulmonary/Chest: She has no wheezes. She has no rhonchi. She has no rales.       Right Side Rheumatological Exam     Examination finds the shoulder, elbow, knee, 2nd PIP, 3rd PIP, 4th PIP and 5th PIP normal.    The patient is tender to palpation of the wrist, 1st MCP, 2nd MCP, 3rd MCP, 4th MCP and 5th MCP    Left Side Rheumatological Exam     Examination finds the shoulder, elbow, knee, 1st PIP, 1st MCP, 2nd PIP, 2nd MCP, 3rd PIP, 3rd MCP, 4th PIP, 4th MCP, 5th PIP and 5th MCP normal.    The patient is tender to palpation of the wrist.      Lymphadenopathy:     She has no cervical adenopathy.   Neurological: Gait normal.   Skin: No rash (scabbed lesions on the chin and at the nose) noted.     Psychiatric: Mood and affect normal.   Musculoskeletal: She exhibits tenderness (L midfoot) and deformity.          Assessment:       1. Psoriatic arthritis    2. Ulcerative colitis with complication, unspecified location    3. Uveitis    4. Seronegative rheumatoid arthritis    5. GERD with esophagitis    6. Lumbar and sacral osteoarthritis    7. Gastric ulcer, unspecified chronicity, unspecified whether gastric ulcer hemorrhage or perforation present            Plan:       Psoriatic arthritis  -     LOTEMAX 0.5 % ophthalmic suspension; Place 2 drops into both eyes 4 (four) times daily.  Dispense: 15 mL; Refill: 6  -     Ambulatory consult to Gastroenterology  -     sucralfate (CARAFATE) 1 gram tablet; Take 1 tablet (1 g total) by mouth 4 (four) times daily.  Dispense: 120 tablet; Refill: 3  -     ondansetron (ZOFRAN-ODT) 4 MG TbDL; Take 1 tablet (4 mg total) by mouth every 8 (eight) hours as needed.  Dispense: 45  tablet; Refill: 3  -     Discontinue: HYDROcodone-acetaminophen (NORCO) 7.5-325 mg per tablet; Take 1 tablet by mouth every 8 (eight) hours as needed for Pain.  Dispense: 90 tablet; Refill: 0  -     Discontinue: HYDROcodone-acetaminophen (NORCO) 7.5-325 mg per tablet; Take 1 tablet by mouth every 8 (eight) hours as needed for Pain.  Dispense: 90 tablet; Refill: 0  -     HYDROcodone-acetaminophen (NORCO) 7.5-325 mg per tablet; Take 1 tablet by mouth every 8 (eight) hours as needed for Pain.  Dispense: 90 tablet; Refill: 0  -     Helicobacter Pylori Ab (IgM); Future; Expected date: 05/17/2019  -     H. PYLORI ANTIBODY, IGG; Future; Expected date: 05/17/2019  -     H. pylori antibody, IgA; Future; Expected date: 05/17/2019  -     ketorolac injection 60 mg  -     cyanocobalamin injection 1,000 mcg    Ulcerative colitis with complication, unspecified location  -     LOTEMAX 0.5 % ophthalmic suspension; Place 2 drops into both eyes 4 (four) times daily.  Dispense: 15 mL; Refill: 6  -     Ambulatory consult to Gastroenterology  -     sucralfate (CARAFATE) 1 gram tablet; Take 1 tablet (1 g total) by mouth 4 (four) times daily.  Dispense: 120 tablet; Refill: 3  -     ondansetron (ZOFRAN-ODT) 4 MG TbDL; Take 1 tablet (4 mg total) by mouth every 8 (eight) hours as needed.  Dispense: 45 tablet; Refill: 3  -     Helicobacter Pylori Ab (IgM); Future; Expected date: 05/17/2019  -     H. PYLORI ANTIBODY, IGG; Future; Expected date: 05/17/2019  -     H. pylori antibody, IgA; Future; Expected date: 05/17/2019  -     ketorolac injection 60 mg  -     cyanocobalamin injection 1,000 mcg  -     H. pylori antibody, IgA; Future; Expected date: 05/17/2019  -     Helicobacter Pylori Ab (IgM); Future; Expected date: 05/17/2019  -     H. PYLORI ANTIBODY, IGG; Future; Expected date: 05/17/2019    Uveitis  -     LOTEMAX 0.5 % ophthalmic suspension; Place 2 drops into both eyes 4 (four) times daily.  Dispense: 15 mL; Refill: 6  -     Ambulatory  consult to Gastroenterology  -     sucralfate (CARAFATE) 1 gram tablet; Take 1 tablet (1 g total) by mouth 4 (four) times daily.  Dispense: 120 tablet; Refill: 3  -     ondansetron (ZOFRAN-ODT) 4 MG TbDL; Take 1 tablet (4 mg total) by mouth every 8 (eight) hours as needed.  Dispense: 45 tablet; Refill: 3  -     Helicobacter Pylori Ab (IgM); Future; Expected date: 05/17/2019  -     H. PYLORI ANTIBODY, IGG; Future; Expected date: 05/17/2019  -     H. pylori antibody, IgA; Future; Expected date: 05/17/2019    Seronegative rheumatoid arthritis  -     sucralfate (CARAFATE) 1 gram tablet; Take 1 tablet (1 g total) by mouth 4 (four) times daily.  Dispense: 120 tablet; Refill: 3  -     ondansetron (ZOFRAN-ODT) 4 MG TbDL; Take 1 tablet (4 mg total) by mouth every 8 (eight) hours as needed.  Dispense: 45 tablet; Refill: 3  -     Helicobacter Pylori Ab (IgM); Future; Expected date: 05/17/2019  -     H. PYLORI ANTIBODY, IGG; Future; Expected date: 05/17/2019  -     H. pylori antibody, IgA; Future; Expected date: 05/17/2019  -     ketorolac injection 60 mg  -     cyanocobalamin injection 1,000 mcg    GERD with esophagitis  -     sucralfate (CARAFATE) 1 gram tablet; Take 1 tablet (1 g total) by mouth 4 (four) times daily.  Dispense: 120 tablet; Refill: 3  -     ondansetron (ZOFRAN-ODT) 4 MG TbDL; Take 1 tablet (4 mg total) by mouth every 8 (eight) hours as needed.  Dispense: 45 tablet; Refill: 3  -     Helicobacter Pylori Ab (IgM); Future; Expected date: 05/17/2019  -     H. PYLORI ANTIBODY, IGG; Future; Expected date: 05/17/2019  -     H. pylori antibody, IgA; Future; Expected date: 05/17/2019    Lumbar and sacral osteoarthritis  -     Discontinue: HYDROcodone-acetaminophen (NORCO) 7.5-325 mg per tablet; Take 1 tablet by mouth every 8 (eight) hours as needed for Pain.  Dispense: 90 tablet; Refill: 0  -     Discontinue: HYDROcodone-acetaminophen (NORCO) 7.5-325 mg per tablet; Take 1 tablet by mouth every 8 (eight) hours as needed  for Pain.  Dispense: 90 tablet; Refill: 0  -     HYDROcodone-acetaminophen (NORCO) 7.5-325 mg per tablet; Take 1 tablet by mouth every 8 (eight) hours as needed for Pain.  Dispense: 90 tablet; Refill: 0  -     Helicobacter Pylori Ab (IgM); Future; Expected date: 05/17/2019  -     H. PYLORI ANTIBODY, IGG; Future; Expected date: 05/17/2019  -     H. pylori antibody, IgA; Future; Expected date: 05/17/2019  -     ketorolac injection 60 mg  -     cyanocobalamin injection 1,000 mcg    Gastric ulcer, unspecified chronicity, unspecified whether gastric ulcer hemorrhage or perforation present  -     H. pylori antibody, IgA; Future; Expected date: 05/17/2019  -     Helicobacter Pylori Ab (IgM); Future; Expected date: 05/17/2019  -     H. PYLORI ANTIBODY, IGG; Future; Expected date: 05/17/2019

## 2019-05-20 ENCOUNTER — DOCUMENTATION ONLY (OUTPATIENT)
Dept: RHEUMATOLOGY | Facility: CLINIC | Age: 49
End: 2019-05-20

## 2019-05-24 ENCOUNTER — DOCUMENTATION ONLY (OUTPATIENT)
Dept: RHEUMATOLOGY | Facility: CLINIC | Age: 49
End: 2019-05-24

## 2019-05-24 NOTE — PROGRESS NOTES
Received office notes form Dr Ollie Bowie from colonoscopy on 5-22-19. Impressions: mild inflammation secondary to UC. Recommendations return to normal activities, resume previous diet, continue current medications, return  To [facility] as scheduled. Progress notes sent to scan

## 2019-05-31 ENCOUNTER — TELEPHONE (OUTPATIENT)
Dept: RHEUMATOLOGY | Facility: CLINIC | Age: 49
End: 2019-05-31

## 2019-05-31 NOTE — TELEPHONE ENCOUNTER
Filed PA for Lotemax 0.5% ophtalmic susp 15 mL on CoverMyMeds-No PA required, covered under plan. Attempted to reach [facility] to notify them, no answer.

## 2019-08-14 DIAGNOSIS — M47.817 LUMBAR AND SACRAL OSTEOARTHRITIS: ICD-10-CM

## 2019-08-14 DIAGNOSIS — K51.90 ULCERATIVE COLITIS WITHOUT COMPLICATIONS: ICD-10-CM

## 2019-08-14 DIAGNOSIS — M79.7 FIBROMYALGIA: ICD-10-CM

## 2019-08-14 RX ORDER — VALACYCLOVIR HYDROCHLORIDE 1 G/1
TABLET, FILM COATED ORAL
Qty: 42 TABLET | Refills: 3 | Status: SHIPPED | OUTPATIENT
Start: 2019-08-14 | End: 2020-11-23 | Stop reason: SDUPTHER

## 2019-08-20 ENCOUNTER — PATIENT MESSAGE (OUTPATIENT)
Dept: RHEUMATOLOGY | Facility: CLINIC | Age: 49
End: 2019-08-20

## 2019-08-20 RX ORDER — HYDROCODONE BITARTRATE AND ACETAMINOPHEN 7.5; 325 MG/1; MG/1
1 TABLET ORAL EVERY 6 HOURS PRN
COMMUNITY
End: 2019-08-20 | Stop reason: SDUPTHER

## 2019-08-21 RX ORDER — HYDROCODONE BITARTRATE AND ACETAMINOPHEN 7.5; 325 MG/1; MG/1
1 TABLET ORAL EVERY 8 HOURS PRN
Qty: 90 TABLET | Refills: 0 | Status: SHIPPED | OUTPATIENT
Start: 2019-08-21 | End: 2019-10-23 | Stop reason: SDUPTHER

## 2019-09-24 ENCOUNTER — PATIENT MESSAGE (OUTPATIENT)
Dept: RHEUMATOLOGY | Facility: CLINIC | Age: 49
End: 2019-09-24

## 2019-09-24 NOTE — TELEPHONE ENCOUNTER
You can offer her to come in for nurse shots: toradol 60, depomedrol 160 for her arthritis flare and to keep her appt with Dr. Devi next month. Or you can offer her an appointment with me this week if she would like to be seen sooner. I can order x-ray of her hands/feet to be done before her visit with Dr. Devi. I don't see any recent x-rays.

## 2019-09-25 ENCOUNTER — PATIENT MESSAGE (OUTPATIENT)
Dept: RHEUMATOLOGY | Facility: CLINIC | Age: 49
End: 2019-09-25

## 2019-09-25 ENCOUNTER — CLINICAL SUPPORT (OUTPATIENT)
Dept: RHEUMATOLOGY | Facility: CLINIC | Age: 49
End: 2019-09-25
Payer: COMMERCIAL

## 2019-09-25 VITALS — DIASTOLIC BLOOD PRESSURE: 81 MMHG | HEART RATE: 59 BPM | SYSTOLIC BLOOD PRESSURE: 124 MMHG

## 2019-09-25 DIAGNOSIS — M06.00 SERONEGATIVE RHEUMATOID ARTHRITIS: Primary | ICD-10-CM

## 2019-09-25 PROCEDURE — 96372 PR INJECTION,THERAP/PROPH/DIAG2ST, IM OR SUBCUT: ICD-10-PCS | Mod: S$GLB,,, | Performed by: PHYSICIAN ASSISTANT

## 2019-09-25 PROCEDURE — 99999 PR PBB SHADOW E&M-EST. PATIENT-LVL III: CPT | Mod: PBBFAC,,,

## 2019-09-25 PROCEDURE — 99999 PR PBB SHADOW E&M-EST. PATIENT-LVL III: ICD-10-PCS | Mod: PBBFAC,,,

## 2019-09-25 PROCEDURE — 96372 THER/PROPH/DIAG INJ SC/IM: CPT | Mod: S$GLB,,, | Performed by: PHYSICIAN ASSISTANT

## 2019-09-25 RX ORDER — KETOROLAC TROMETHAMINE 30 MG/ML
60 INJECTION, SOLUTION INTRAMUSCULAR; INTRAVENOUS
Status: COMPLETED | OUTPATIENT
Start: 2019-09-25 | End: 2019-09-25

## 2019-09-25 RX ORDER — METHYLPREDNISOLONE ACETATE 80 MG/ML
160 INJECTION, SUSPENSION INTRA-ARTICULAR; INTRALESIONAL; INTRAMUSCULAR; SOFT TISSUE
Status: COMPLETED | OUTPATIENT
Start: 2019-09-25 | End: 2019-09-25

## 2019-09-25 RX ADMIN — KETOROLAC TROMETHAMINE 60 MG: 30 INJECTION, SOLUTION INTRAMUSCULAR; INTRAVENOUS at 10:09

## 2019-09-25 RX ADMIN — METHYLPREDNISOLONE ACETATE 160 MG: 80 INJECTION, SUSPENSION INTRA-ARTICULAR; INTRALESIONAL; INTRAMUSCULAR; SOFT TISSUE at 10:09

## 2019-09-25 NOTE — PROGRESS NOTES
Patient presented to clinic needing nurse injections. Complaining of level 8 out of 10 pain level to elbows, neck, hands and feet. Nurse observed swelling to both hands. Visible know to side of left foot. Patient stated having numbness to hands. Stated having a lot of pain in hands and elbows at this current time. Requested xrays of hands and feet to see if the arthritis is getting worse. Stated has been off of Humira since February trying out a UC study to see if she can improve her symptoms. Mrs Simmons has already reviewed recent lab results and advised patient can have 60 mg Toradol and 160 mg DepoMedrol.     Administered 2 cc ( 80 mg/ml ) of depomedrol to the right upper outer gluteal. Informed of s/s to report verbalized understanding. No adverse reactions noted.    Lot # [id number]  Expiration 12/2020    Administered 2 cc ( 30 mg/ml ) of toradol to the left upper outer gluteal. Informed of s/s to report verbalized understanding. No adverse reactions noted.    Lot # [id number]  Expiration 1 aug 2020

## 2019-10-22 ENCOUNTER — HOSPITAL ENCOUNTER (OUTPATIENT)
Dept: RADIOLOGY | Facility: CLINIC | Age: 49
Discharge: HOME | End: 2019-10-22
Attending: PHYSICIAN ASSISTANT
Payer: COMMERCIAL

## 2019-10-22 DIAGNOSIS — M06.00 SERONEGATIVE RHEUMATOID ARTHRITIS: ICD-10-CM

## 2019-10-22 PROCEDURE — 73630 XR FOOT COMPLETE 3 VIEW BILATERAL: ICD-10-PCS | Mod: 26,S$GLB,, | Performed by: RADIOLOGY

## 2019-10-22 PROCEDURE — 73630 X-RAY EXAM OF FOOT: CPT | Mod: 50,TC,FY,PO

## 2019-10-22 PROCEDURE — 73130 XR HAND COMPLETE 3 VIEWS BILATERAL: ICD-10-PCS | Mod: 26,S$GLB,, | Performed by: RADIOLOGY

## 2019-10-22 PROCEDURE — 73630 X-RAY EXAM OF FOOT: CPT | Mod: 26,S$GLB,, | Performed by: RADIOLOGY

## 2019-10-22 PROCEDURE — 73130 X-RAY EXAM OF HAND: CPT | Mod: 50,TC,FY,PO

## 2019-10-22 PROCEDURE — 73130 X-RAY EXAM OF HAND: CPT | Mod: 26,S$GLB,, | Performed by: RADIOLOGY

## 2019-10-23 ENCOUNTER — LAB VISIT (OUTPATIENT)
Dept: LAB | Facility: HOSPITAL | Age: 49
End: 2019-10-23
Attending: INTERNAL MEDICINE
Payer: COMMERCIAL

## 2019-10-23 ENCOUNTER — OFFICE VISIT (OUTPATIENT)
Dept: RHEUMATOLOGY | Facility: CLINIC | Age: 49
End: 2019-10-23
Payer: COMMERCIAL

## 2019-10-23 VITALS
HEART RATE: 80 BPM | BODY MASS INDEX: 24.15 KG/M2 | SYSTOLIC BLOOD PRESSURE: 112 MMHG | DIASTOLIC BLOOD PRESSURE: 63 MMHG | WEIGHT: 123 LBS | HEIGHT: 60 IN

## 2019-10-23 DIAGNOSIS — G89.4 CHRONIC PAIN SYNDROME: ICD-10-CM

## 2019-10-23 DIAGNOSIS — L40.50 PSORIATIC ARTHRITIS: Primary | ICD-10-CM

## 2019-10-23 DIAGNOSIS — K51.919 ULCERATIVE COLITIS WITH COMPLICATION, UNSPECIFIED LOCATION: ICD-10-CM

## 2019-10-23 DIAGNOSIS — M06.00 SERONEGATIVE RHEUMATOID ARTHRITIS: ICD-10-CM

## 2019-10-23 DIAGNOSIS — M47.817 LUMBAR AND SACRAL OSTEOARTHRITIS: ICD-10-CM

## 2019-10-23 DIAGNOSIS — K21.00 GERD WITH ESOPHAGITIS: ICD-10-CM

## 2019-10-23 DIAGNOSIS — R30.0 DYSURIA: ICD-10-CM

## 2019-10-23 LAB
BILIRUB UR QL STRIP: NEGATIVE
CLARITY UR: CLEAR
COLOR UR: YELLOW
GLUCOSE UR QL STRIP: NEGATIVE
HGB UR QL STRIP: NEGATIVE
KETONES UR QL STRIP: NEGATIVE
LEUKOCYTE ESTERASE UR QL STRIP: NEGATIVE
NITRITE UR QL STRIP: NEGATIVE
PH UR STRIP: 6 [PH] (ref 5–8)
PROT UR QL STRIP: NEGATIVE
SP GR UR STRIP: >=1.03 (ref 1–1.03)
URN SPEC COLLECT METH UR: ABNORMAL

## 2019-10-23 PROCEDURE — 96372 PR INJECTION,THERAP/PROPH/DIAG2ST, IM OR SUBCUT: ICD-10-PCS | Mod: S$GLB,,, | Performed by: INTERNAL MEDICINE

## 2019-10-23 PROCEDURE — 99214 PR OFFICE/OUTPT VISIT, EST, LEVL IV, 30-39 MIN: ICD-10-PCS | Mod: 25,S$GLB,, | Performed by: INTERNAL MEDICINE

## 2019-10-23 PROCEDURE — 87086 URINE CULTURE/COLONY COUNT: CPT

## 2019-10-23 PROCEDURE — 3008F BODY MASS INDEX DOCD: CPT | Mod: CPTII,S$GLB,, | Performed by: INTERNAL MEDICINE

## 2019-10-23 PROCEDURE — 3008F PR BODY MASS INDEX (BMI) DOCUMENTED: ICD-10-PCS | Mod: CPTII,S$GLB,, | Performed by: INTERNAL MEDICINE

## 2019-10-23 PROCEDURE — 99999 PR PBB SHADOW E&M-EST. PATIENT-LVL III: CPT | Mod: PBBFAC,,, | Performed by: INTERNAL MEDICINE

## 2019-10-23 PROCEDURE — 99999 PR PBB SHADOW E&M-EST. PATIENT-LVL III: ICD-10-PCS | Mod: PBBFAC,,, | Performed by: INTERNAL MEDICINE

## 2019-10-23 PROCEDURE — 96372 THER/PROPH/DIAG INJ SC/IM: CPT | Mod: S$GLB,,, | Performed by: INTERNAL MEDICINE

## 2019-10-23 PROCEDURE — 81003 URINALYSIS AUTO W/O SCOPE: CPT | Mod: PO

## 2019-10-23 PROCEDURE — 99214 OFFICE O/P EST MOD 30 MIN: CPT | Mod: 25,S$GLB,, | Performed by: INTERNAL MEDICINE

## 2019-10-23 RX ORDER — HYDROCODONE BITARTRATE AND ACETAMINOPHEN 7.5; 325 MG/1; MG/1
1 TABLET ORAL EVERY 8 HOURS PRN
Qty: 90 TABLET | Refills: 0 | Status: SHIPPED | OUTPATIENT
Start: 2019-10-23 | End: 2019-10-23 | Stop reason: SDUPTHER

## 2019-10-23 RX ORDER — HYDROCODONE BITARTRATE AND ACETAMINOPHEN 7.5; 325 MG/1; MG/1
1 TABLET ORAL EVERY 8 HOURS PRN
Qty: 90 TABLET | Refills: 0 | Status: SHIPPED | OUTPATIENT
Start: 2019-12-21 | End: 2020-01-20

## 2019-10-23 RX ORDER — METHYLPREDNISOLONE ACETATE 80 MG/ML
80 INJECTION, SUSPENSION INTRA-ARTICULAR; INTRALESIONAL; INTRAMUSCULAR; SOFT TISSUE
Status: COMPLETED | OUTPATIENT
Start: 2019-10-23 | End: 2019-10-23

## 2019-10-23 RX ORDER — KETOROLAC TROMETHAMINE 30 MG/ML
60 INJECTION, SOLUTION INTRAMUSCULAR; INTRAVENOUS
Status: COMPLETED | OUTPATIENT
Start: 2019-10-23 | End: 2019-10-23

## 2019-10-23 RX ORDER — HYDROCODONE BITARTRATE AND ACETAMINOPHEN 7.5; 325 MG/1; MG/1
1 TABLET ORAL EVERY 8 HOURS PRN
Qty: 90 TABLET | Refills: 0 | Status: SHIPPED | OUTPATIENT
Start: 2019-11-22 | End: 2019-10-23 | Stop reason: SDUPTHER

## 2019-10-23 RX ORDER — CLONAZEPAM 0.5 MG/1
0.5 TABLET ORAL 2 TIMES DAILY PRN
Qty: 60 TABLET | Refills: 3 | Status: SHIPPED | OUTPATIENT
Start: 2019-10-23 | End: 2020-07-28

## 2019-10-23 RX ADMIN — KETOROLAC TROMETHAMINE 60 MG: 30 INJECTION, SOLUTION INTRAMUSCULAR; INTRAVENOUS at 11:10

## 2019-10-23 RX ADMIN — METHYLPREDNISOLONE ACETATE 80 MG: 80 INJECTION, SUSPENSION INTRA-ARTICULAR; INTRALESIONAL; INTRAMUSCULAR; SOFT TISSUE at 11:10

## 2019-10-23 ASSESSMENT — ROUTINE ASSESSMENT OF PATIENT INDEX DATA (RAPID3)
TOTAL RAPID3 SCORE: 5.28
PAIN SCORE: 7.5
MDHAQ FUNCTION SCORE: 1
PATIENT GLOBAL ASSESSMENT SCORE: 5
PSYCHOLOGICAL DISTRESS SCORE: 4.4
FATIGUE SCORE: 1.1

## 2019-10-23 NOTE — PROGRESS NOTES
Subjective:       Patient ID: Osiris Billingsley is a 48 y.o. female.    Chief Complaint: Psoriatic Arthritis; Rheumatoid Arthritis; and Disease Management    Follow up: 48 year on rheumatoid arthritis. Celiac and now is biopsy proven 2017, possible starting a clinical study( pill with good stools products)  She complains of R hand numbness, increasing swelling in her hands as well as L midfoot swelling and pain. She had a joint flare, and  Kidney issues    Review of Systems   Constitutional: Negative for activity change and appetite change.   HENT: Negative for postnasal drip, rhinorrhea and sinus pain.    Eyes: Negative for visual disturbance.   Respiratory: Negative for shortness of breath.    Cardiovascular: Negative for palpitations and leg swelling.   Gastrointestinal: Negative for constipation and diarrhea.   Musculoskeletal: Positive for arthralgias, back pain, joint swelling, myalgias, neck pain and neck stiffness.   Allergic/Immunologic: Positive for immunocompromised state.   Neurological: Negative for dizziness and light-headedness.         Objective:     Vitals:    10/23/19 0923   BP: 112/63   Pulse: 80            Physical Exam   Constitutional: She is well-developed, well-nourished, and in no distress.   Eyes: Right conjunctiva is not injected. Left conjunctiva is not injected. Right eye exhibits normal extraocular motion. Left eye exhibits normal extraocular motion.   Neck: No JVD present. No thyromegaly present.   Cardiovascular: Normal rate and regular rhythm.  Exam reveals no decreased pulses.    Pulmonary/Chest: She has no wheezes. She has no rhonchi. She has no rales.       Right Side Rheumatological Exam     Examination finds the shoulder, elbow, knee, 2nd PIP, 3rd PIP, 4th PIP and 5th PIP normal.    The patient is tender to palpation of the wrist, 1st MCP, 2nd MCP, 3rd MCP, 4th MCP and 5th MCP    Left Side Rheumatological Exam     Examination finds the shoulder, elbow, knee, 1st PIP, 1st MCP,  2nd PIP, 2nd MCP, 3rd PIP, 3rd MCP, 4th PIP, 4th MCP, 5th PIP and 5th MCP normal.    The patient is tender to palpation of the wrist.      Lymphadenopathy:     She has no cervical adenopathy.   Neurological: Gait normal.   Skin: No rash (scabbed lesions on the chin and at the nose) noted.     Psychiatric: Mood and affect normal.   Musculoskeletal: She exhibits tenderness (L midfoot) and deformity.        Results for orders placed or performed in visit on 03/01/19   CBC auto differential   Result Value Ref Range    WBC 5.16 3.90 - 12.70 K/uL    RBC 4.15 4.00 - 5.40 M/uL    Hemoglobin 12.7 12.0 - 16.0 g/dL    Hematocrit 39.7 37.0 - 48.5 %    Mean Corpuscular Volume 96 82 - 98 fL    Mean Corpuscular Hemoglobin 30.6 27.0 - 31.0 pg    Mean Corpuscular Hemoglobin Conc 32.0 32.0 - 36.0 g/dL    RDW 13.3 11.5 - 14.5 %    Platelets 245 150 - 350 K/uL    MPV 11.2 9.2 - 12.9 fL    Immature Granulocytes 0.2 0.0 - 0.5 %    Gran # (ANC) 2.6 1.8 - 7.7 K/uL    Immature Grans (Abs) 0.01 0.00 - 0.04 K/uL    Lymph # 2.1 1.0 - 4.8 K/uL    Mono # 0.3 0.3 - 1.0 K/uL    Eos # 0.1 0.0 - 0.5 K/uL    Baso # 0.03 0.00 - 0.20 K/uL    nRBC 0 0 /100 WBC    Gran% 50.0 38.0 - 73.0 %    Lymph% 40.9 18.0 - 48.0 %    Mono% 6.4 4.0 - 15.0 %    Eosinophil% 1.9 0.0 - 8.0 %    Basophil% 0.6 0.0 - 1.9 %    Differential Method Automated    Comprehensive metabolic panel   Result Value Ref Range    Sodium 138 136 - 145 mmol/L    Potassium 3.9 3.5 - 5.1 mmol/L    Chloride 103 95 - 110 mmol/L    CO2 27 23 - 29 mmol/L    Glucose 94 70 - 110 mg/dL    BUN, Bld 13 6 - 20 mg/dL    Creatinine 0.9 0.5 - 1.4 mg/dL    Calcium 9.3 8.7 - 10.5 mg/dL    Total Protein 6.9 6.0 - 8.4 g/dL    Albumin 3.9 3.5 - 5.2 g/dL    Total Bilirubin 0.8 0.1 - 1.0 mg/dL    Alkaline Phosphatase 60 55 - 135 U/L    AST 17 10 - 40 U/L    ALT 12 10 - 44 U/L    Anion Gap 8 8 - 16 mmol/L    eGFR if African American >60.0 >60 mL/min/1.73 m^2    eGFR if non African American >60.0 >60 mL/min/1.73 m^2    Sedimentation rate   Result Value Ref Range    Sed Rate 8 0 - 20 mm/Hr   C-reactive protein   Result Value Ref Range    CRP 0.3 0.0 - 8.2 mg/L       Assessment:       1. Psoriatic arthritis    2. Ulcerative colitis with complication, unspecified location    3. GERD with esophagitis    4. Lumbar and sacral osteoarthritis    5. Seronegative rheumatoid arthritis    6. Chronic pain syndrome    7. Dysuria            Plan:       Psoriatic arthritis  -     methylPREDNISolone acetate injection 80 mg  -     ketorolac injection 60 mg  -     clonazePAM (KLONOPIN) 0.5 MG tablet; Take 1 tablet (0.5 mg total) by mouth 2 (two) times daily as needed for Anxiety.  Dispense: 60 tablet; Refill: 3    Ulcerative colitis with complication, unspecified location  -     methylPREDNISolone acetate injection 80 mg  -     ketorolac injection 60 mg  -     clonazePAM (KLONOPIN) 0.5 MG tablet; Take 1 tablet (0.5 mg total) by mouth 2 (two) times daily as needed for Anxiety.  Dispense: 60 tablet; Refill: 3    GERD with esophagitis  -     methylPREDNISolone acetate injection 80 mg  -     ketorolac injection 60 mg  -     clonazePAM (KLONOPIN) 0.5 MG tablet; Take 1 tablet (0.5 mg total) by mouth 2 (two) times daily as needed for Anxiety.  Dispense: 60 tablet; Refill: 3    Lumbar and sacral osteoarthritis  -     methylPREDNISolone acetate injection 80 mg  -     ketorolac injection 60 mg  -     clonazePAM (KLONOPIN) 0.5 MG tablet; Take 1 tablet (0.5 mg total) by mouth 2 (two) times daily as needed for Anxiety.  Dispense: 60 tablet; Refill: 3    Seronegative rheumatoid arthritis  -     methylPREDNISolone acetate injection 80 mg  -     ketorolac injection 60 mg  -     clonazePAM (KLONOPIN) 0.5 MG tablet; Take 1 tablet (0.5 mg total) by mouth 2 (two) times daily as needed for Anxiety.  Dispense: 60 tablet; Refill: 3    Chronic pain syndrome  -     Discontinue: HYDROcodone-acetaminophen (NORCO) 7.5-325 mg per tablet; Take 1 tablet by mouth every 8 (eight)  hours as needed. Chronic pain >7 day required, medically necessary dx code G89.4  Dispense: 90 tablet; Refill: 0  -     Discontinue: HYDROcodone-acetaminophen (NORCO) 7.5-325 mg per tablet; Take 1 tablet by mouth every 8 (eight) hours as needed. Chronic pain >7 day required, medically necessary dx code G89.4  Dispense: 90 tablet; Refill: 0  -     HYDROcodone-acetaminophen (NORCO) 7.5-325 mg per tablet; Take 1 tablet by mouth every 8 (eight) hours as needed. Chronic pain >7 day required, medically necessary dx code G89.4  Dispense: 90 tablet; Refill: 0    Dysuria  -     Urinalysis; Future; Expected date: 10/23/2019  -     Urine culture; Future; Expected date: 10/23/2019        I have  check [place] prescription monitoring program site and no unusual or abnormal behavior has occurred pt understand the risk and benefits of taking opioid medications and has decided to continue the  medication

## 2019-10-23 NOTE — PROGRESS NOTES
Administered 1 cc ( 80 mg/ml ) of depomedrol to the right upper outer gluteal. Informed of s/s to report verbalized understanding. No adverse reactions noted.    Lot # [id number]  Expiration 12/2020    Administered 2 cc ( 30 mg/ml ) of toradol to the left upper outer gluteal. Informed of s/s to report verbalized understanding. No adverse reactions noted.    Lot # [id number]  Expiration 1 aug 2020

## 2019-10-24 ENCOUNTER — PATIENT MESSAGE (OUTPATIENT)
Dept: RHEUMATOLOGY | Facility: CLINIC | Age: 49
End: 2019-10-24

## 2019-10-24 LAB — BACTERIA UR CULT: NO GROWTH

## 2020-01-29 ENCOUNTER — OFFICE VISIT (OUTPATIENT)
Dept: RHEUMATOLOGY | Facility: CLINIC | Age: 50
End: 2020-01-29
Payer: COMMERCIAL

## 2020-01-29 VITALS
WEIGHT: 130 LBS | HEIGHT: 60 IN | HEART RATE: 73 BPM | BODY MASS INDEX: 25.52 KG/M2 | SYSTOLIC BLOOD PRESSURE: 140 MMHG | DIASTOLIC BLOOD PRESSURE: 70 MMHG

## 2020-01-29 DIAGNOSIS — M47.817 LUMBAR AND SACRAL OSTEOARTHRITIS: ICD-10-CM

## 2020-01-29 DIAGNOSIS — G89.4 CHRONIC PAIN SYNDROME: ICD-10-CM

## 2020-01-29 DIAGNOSIS — K51.919 ULCERATIVE COLITIS WITH COMPLICATION, UNSPECIFIED LOCATION: ICD-10-CM

## 2020-01-29 DIAGNOSIS — L40.50 PSORIATIC ARTHRITIS: Primary | ICD-10-CM

## 2020-01-29 DIAGNOSIS — L40.8 PSORIASIS WITH PUSTULES: ICD-10-CM

## 2020-01-29 PROCEDURE — 99999 PR PBB SHADOW E&M-EST. PATIENT-LVL III: ICD-10-PCS | Mod: PBBFAC,,, | Performed by: INTERNAL MEDICINE

## 2020-01-29 PROCEDURE — 96372 PR INJECTION,THERAP/PROPH/DIAG2ST, IM OR SUBCUT: ICD-10-PCS | Mod: S$GLB,,, | Performed by: INTERNAL MEDICINE

## 2020-01-29 PROCEDURE — 99999 PR PBB SHADOW E&M-EST. PATIENT-LVL III: CPT | Mod: PBBFAC,,, | Performed by: INTERNAL MEDICINE

## 2020-01-29 PROCEDURE — 3008F BODY MASS INDEX DOCD: CPT | Mod: CPTII,S$GLB,, | Performed by: INTERNAL MEDICINE

## 2020-01-29 PROCEDURE — 99215 OFFICE O/P EST HI 40 MIN: CPT | Mod: 25,S$GLB,, | Performed by: INTERNAL MEDICINE

## 2020-01-29 PROCEDURE — 3008F PR BODY MASS INDEX (BMI) DOCUMENTED: ICD-10-PCS | Mod: CPTII,S$GLB,, | Performed by: INTERNAL MEDICINE

## 2020-01-29 PROCEDURE — 96372 THER/PROPH/DIAG INJ SC/IM: CPT | Mod: S$GLB,,, | Performed by: INTERNAL MEDICINE

## 2020-01-29 PROCEDURE — 99215 PR OFFICE/OUTPT VISIT, EST, LEVL V, 40-54 MIN: ICD-10-PCS | Mod: 25,S$GLB,, | Performed by: INTERNAL MEDICINE

## 2020-01-29 RX ORDER — METHYLPREDNISOLONE ACETATE 80 MG/ML
160 INJECTION, SUSPENSION INTRA-ARTICULAR; INTRALESIONAL; INTRAMUSCULAR; SOFT TISSUE
Status: COMPLETED | OUTPATIENT
Start: 2020-01-29 | End: 2020-01-29

## 2020-01-29 RX ORDER — HYDROCODONE BITARTRATE AND ACETAMINOPHEN 7.5; 325 MG/1; MG/1
1 TABLET ORAL EVERY 8 HOURS PRN
Qty: 90 TABLET | Refills: 0 | Status: SHIPPED | OUTPATIENT
Start: 2020-03-29 | End: 2020-04-03 | Stop reason: SDUPTHER

## 2020-01-29 RX ORDER — HYDROCODONE BITARTRATE AND ACETAMINOPHEN 7.5; 325 MG/1; MG/1
1 TABLET ORAL EVERY 8 HOURS PRN
Qty: 90 TABLET | Refills: 0 | Status: SHIPPED | OUTPATIENT
Start: 2020-02-29 | End: 2020-01-29 | Stop reason: SDUPTHER

## 2020-01-29 RX ORDER — KETOROLAC TROMETHAMINE 30 MG/ML
60 INJECTION, SOLUTION INTRAMUSCULAR; INTRAVENOUS
Status: COMPLETED | OUTPATIENT
Start: 2020-01-29 | End: 2020-01-29

## 2020-01-29 RX ORDER — CYANOCOBALAMIN 1000 UG/ML
1000 INJECTION, SOLUTION INTRAMUSCULAR; SUBCUTANEOUS
Status: COMPLETED | OUTPATIENT
Start: 2020-01-29 | End: 2020-01-29

## 2020-01-29 RX ORDER — HYDROCODONE BITARTRATE AND ACETAMINOPHEN 7.5; 325 MG/1; MG/1
1 TABLET ORAL EVERY 8 HOURS PRN
Qty: 90 TABLET | Refills: 0 | Status: SHIPPED | OUTPATIENT
Start: 2020-01-29 | End: 2020-01-29 | Stop reason: SDUPTHER

## 2020-01-29 RX ORDER — HYDROCODONE BITARTRATE AND ACETAMINOPHEN 7.5; 325 MG/1; MG/1
1 TABLET ORAL EVERY 6 HOURS PRN
COMMUNITY
End: 2020-01-29 | Stop reason: SDUPTHER

## 2020-01-29 RX ADMIN — KETOROLAC TROMETHAMINE 60 MG: 30 INJECTION, SOLUTION INTRAMUSCULAR; INTRAVENOUS at 12:01

## 2020-01-29 RX ADMIN — METHYLPREDNISOLONE ACETATE 160 MG: 80 INJECTION, SUSPENSION INTRA-ARTICULAR; INTRALESIONAL; INTRAMUSCULAR; SOFT TISSUE at 12:01

## 2020-01-29 RX ADMIN — CYANOCOBALAMIN 1000 MCG: 1000 INJECTION, SOLUTION INTRAMUSCULAR; SUBCUTANEOUS at 12:01

## 2020-01-29 ASSESSMENT — ROUTINE ASSESSMENT OF PATIENT INDEX DATA (RAPID3)
PAIN SCORE: 4.5
PATIENT GLOBAL ASSESSMENT SCORE: 5.5
FATIGUE SCORE: 1.1
MDHAQ FUNCTION SCORE: .6
TOTAL RAPID3 SCORE: 4
PSYCHOLOGICAL DISTRESS SCORE: 2.2

## 2020-01-29 NOTE — PROGRESS NOTES
Subjective:       Patient ID: Osiris Billingsley is a 49 y.o. female.    Chief Complaint: Disease Management; Psoriatic Arthritis; and Rheumatoid Arthritis    Follow up: 48 year on rheumatoid arthritis. Celiac and now is biopsy proven 2017, possible starting a clinical study( pill with good stools products)  She complains of R hand numbness, increasing swelling in her hands as well as L midfoot swelling and pain. She had a joint flare, and  Kidney issues    Review of Systems   Constitutional: Negative for activity change and appetite change.   HENT: Negative for postnasal drip, rhinorrhea and sinus pain.    Eyes: Negative for visual disturbance.   Respiratory: Negative for shortness of breath.    Cardiovascular: Negative for palpitations and leg swelling.   Gastrointestinal: Negative for constipation and diarrhea.   Musculoskeletal: Positive for arthralgias, back pain, neck pain and neck stiffness.   Allergic/Immunologic: Positive for immunocompromised state.   Neurological: Negative for dizziness and light-headedness.         Objective:     Vitals:    01/29/20 1113   BP: (!) 140/70   Pulse: 73        Physical Exam   Constitutional: She is well-developed, well-nourished, and in no distress.   Eyes: Right conjunctiva is not injected. Left conjunctiva is not injected. Right eye exhibits normal extraocular motion. Left eye exhibits normal extraocular motion.   Neck: No JVD present. No thyromegaly present.   Cardiovascular: Normal rate and regular rhythm.  Exam reveals no decreased pulses.    Pulmonary/Chest: She has no wheezes. She has no rhonchi. She has no rales.       Right Side Rheumatological Exam     Examination finds the shoulder, elbow, knee, 2nd PIP, 3rd PIP, 4th PIP and 5th PIP normal.    The patient is tender to palpation of the wrist, 1st MCP, 2nd MCP, 3rd MCP, 4th MCP and 5th MCP    Left Side Rheumatological Exam     Examination finds the shoulder, elbow, knee, 1st PIP, 1st MCP, 2nd PIP, 2nd MCP, 3rd PIP,  3rd MCP, 4th PIP, 4th MCP, 5th PIP and 5th MCP normal.    The patient is tender to palpation of the wrist.      Lymphadenopathy:     She has no cervical adenopathy.   Neurological: Gait normal.   Skin: No rash (scabbed lesions on the chin and at the nose) noted.     Psychiatric: Mood and affect normal.   Musculoskeletal: She exhibits tenderness (L midfoot) and deformity.        Results for orders placed or performed in visit on 10/23/19   Urine culture   Result Value Ref Range    Urine Culture, Routine No growth    Urinalysis   Result Value Ref Range    Specimen UA Urine, Clean Catch     Color, UA Yellow Yellow, Straw, Loretta    Appearance, UA Clear Clear    pH, UA 6.0 5.0 - 8.0    Specific Gravity, UA >=1.030 (A) 1.005 - 1.030    Protein, UA Negative Negative    Glucose, UA Negative Negative    Ketones, UA Negative Negative    Bilirubin (UA) Negative Negative    Occult Blood UA Negative Negative    Nitrite, UA Negative Negative    Leukocytes, UA Negative Negative       Assessment:       1. Psoriatic arthritis    2. Ulcerative colitis with complication, unspecified location    3. Lumbar and sacral osteoarthritis    4. Psoriasis with pustules    5. Chronic pain syndrome            Plan:       Psoriatic arthritis  -     cyanocobalamin injection 1,000 mcg  -     methylPREDNISolone acetate injection 160 mg  -     ketorolac injection 60 mg  -     adalimumab (HUMIRA) 20 mg/0.4 mL SyKt injection; Inject 0.4 mLs (20 mg total) into the skin every 14 (fourteen) days.  Dispense: 2 Syringe; Refill: 2  -     Discontinue: HYDROcodone-acetaminophen (NORCO) 7.5-325 mg per tablet; Take 1 tablet by mouth every 8 (eight) hours as needed for Pain.  Dispense: 90 tablet; Refill: 0  -     Discontinue: HYDROcodone-acetaminophen (NORCO) 7.5-325 mg per tablet; Take 1 tablet by mouth every 8 (eight) hours as needed for Pain.  Dispense: 90 tablet; Refill: 0  -     HYDROcodone-acetaminophen (NORCO) 7.5-325 mg per tablet; Take 1 tablet by mouth  every 8 (eight) hours as needed for Pain.  Dispense: 90 tablet; Refill: 0    Ulcerative colitis with complication, unspecified location  -     cyanocobalamin injection 1,000 mcg  -     methylPREDNISolone acetate injection 160 mg  -     ketorolac injection 60 mg  -     adalimumab (HUMIRA) 20 mg/0.4 mL SyKt injection; Inject 0.4 mLs (20 mg total) into the skin every 14 (fourteen) days.  Dispense: 2 Syringe; Refill: 2  -     Discontinue: HYDROcodone-acetaminophen (NORCO) 7.5-325 mg per tablet; Take 1 tablet by mouth every 8 (eight) hours as needed for Pain.  Dispense: 90 tablet; Refill: 0  -     Discontinue: HYDROcodone-acetaminophen (NORCO) 7.5-325 mg per tablet; Take 1 tablet by mouth every 8 (eight) hours as needed for Pain.  Dispense: 90 tablet; Refill: 0  -     HYDROcodone-acetaminophen (NORCO) 7.5-325 mg per tablet; Take 1 tablet by mouth every 8 (eight) hours as needed for Pain.  Dispense: 90 tablet; Refill: 0    Lumbar and sacral osteoarthritis  -     cyanocobalamin injection 1,000 mcg  -     methylPREDNISolone acetate injection 160 mg  -     ketorolac injection 60 mg  -     adalimumab (HUMIRA) 20 mg/0.4 mL SyKt injection; Inject 0.4 mLs (20 mg total) into the skin every 14 (fourteen) days.  Dispense: 2 Syringe; Refill: 2  -     Discontinue: HYDROcodone-acetaminophen (NORCO) 7.5-325 mg per tablet; Take 1 tablet by mouth every 8 (eight) hours as needed for Pain.  Dispense: 90 tablet; Refill: 0  -     Discontinue: HYDROcodone-acetaminophen (NORCO) 7.5-325 mg per tablet; Take 1 tablet by mouth every 8 (eight) hours as needed for Pain.  Dispense: 90 tablet; Refill: 0  -     HYDROcodone-acetaminophen (NORCO) 7.5-325 mg per tablet; Take 1 tablet by mouth every 8 (eight) hours as needed for Pain.  Dispense: 90 tablet; Refill: 0    Psoriasis with pustules  -     cyanocobalamin injection 1,000 mcg  -     methylPREDNISolone acetate injection 160 mg  -     ketorolac injection 60 mg  -     adalimumab (HUMIRA) 20 mg/0.4 mL  SyKt injection; Inject 0.4 mLs (20 mg total) into the skin every 14 (fourteen) days.  Dispense: 2 Syringe; Refill: 2  -     Discontinue: HYDROcodone-acetaminophen (NORCO) 7.5-325 mg per tablet; Take 1 tablet by mouth every 8 (eight) hours as needed for Pain.  Dispense: 90 tablet; Refill: 0  -     Discontinue: HYDROcodone-acetaminophen (NORCO) 7.5-325 mg per tablet; Take 1 tablet by mouth every 8 (eight) hours as needed for Pain.  Dispense: 90 tablet; Refill: 0  -     HYDROcodone-acetaminophen (NORCO) 7.5-325 mg per tablet; Take 1 tablet by mouth every 8 (eight) hours as needed for Pain.  Dispense: 90 tablet; Refill: 0    Chronic pain syndrome  -     Discontinue: HYDROcodone-acetaminophen (NORCO) 7.5-325 mg per tablet; Take 1 tablet by mouth every 8 (eight) hours as needed for Pain.  Dispense: 90 tablet; Refill: 0  -     Discontinue: HYDROcodone-acetaminophen (NORCO) 7.5-325 mg per tablet; Take 1 tablet by mouth every 8 (eight) hours as needed for Pain.  Dispense: 90 tablet; Refill: 0  -     HYDROcodone-acetaminophen (NORCO) 7.5-325 mg per tablet; Take 1 tablet by mouth every 8 (eight) hours as needed for Pain.  Dispense: 90 tablet; Refill: 0        I have  check [place] prescription monitoring program site and no unusual or abnormal behavior has occurred pt understand the risk and benefits of taking opioid medications and has decided to continue the  medication

## 2020-01-29 NOTE — PROGRESS NOTES
Administered 1 cc ( 1000 mcg/ml ) of b12 to the right upper outer gluteal. Informed of s/s to report verbalized understanding. No adverse reactions noted.    Lot # [id number]  Expiration jan 21    Administered 2 cc ( 80 mg/ml ) of depomedrol to the right upper outer gluteal. Informed of s/s to report verbalized understanding. No adverse reactions noted.    Lot # [id number]  Expiration 03/2021    Administered 2 cc ( 30 mg/ml ) of toradol to the left upper outer gluteal. Informed of s/s to report verbalized understanding. No adverse reactions noted.    Lot # [id number]  Expiration 1 aug 2020

## 2020-01-31 ENCOUNTER — TELEPHONE (OUTPATIENT)
Dept: PHARMACY | Facility: CLINIC | Age: 50
End: 2020-01-31

## 2020-01-31 NOTE — TELEPHONE ENCOUNTER
LVM for callback to inform patient that [facility] received prescription for Humira and prior authorization is required.  [facility] will be back in touch once insurance determination is received.

## 2020-02-13 ENCOUNTER — PATIENT MESSAGE (OUTPATIENT)
Dept: RHEUMATOLOGY | Facility: CLINIC | Age: 50
End: 2020-02-13

## 2020-02-13 DIAGNOSIS — R21 RASH: Primary | ICD-10-CM

## 2020-03-02 DIAGNOSIS — M06.00 SERONEGATIVE RHEUMATOID ARTHRITIS: ICD-10-CM

## 2020-03-02 DIAGNOSIS — L40.50 PSORIATIC ARTHRITIS: Primary | ICD-10-CM

## 2020-03-02 NOTE — TELEPHONE ENCOUNTER
I do not see any hep b, c or TB testing within the last year. I also ordered cbc/cmp/esr/crp as well. If she has done those recently I can cancel whatever needed so we don't duplicate orders.

## 2020-03-02 NOTE — TELEPHONE ENCOUNTER
DOCUMENTATION ONLY:     Prior Authorization for Humira APPROVED from 2/28/20 to 2/28/21.      Case ID# [id number]    Co-Pay: $unknown    Patient Assistance IS NOT required.     [facility] is NOT in network patient MUST fill with [facility].     [facility]:   [phone] (Phone)  [phone] (Fax)     JEB

## 2020-03-02 NOTE — TELEPHONE ENCOUNTER
"----- Message from Susan Syed sent at 3/2/2020 10:48 AM CST -----  Regarding: Humira-[facility]   RENETTA Apdoaca: Humira prior authorization has been approved through 2/28/21. Patients insurance requires the patient to fill through [facility]. Please send prescription to [facility], which has been added to patient's Epic profile. Patient has been notified and provided with the necessary information to call and schedule a delivery.     To complete this in Epic, the original order MUST be discontinued and re-typed as a new prescription with the updated pharmacy listed. Clicking "reorder" will continue to route the prescription to [facility] even if the pharmacy is changed. Please OPT the patient out of [facility] when the BPA is fired.     Thanks,   Susan Syed   [phone]    "

## 2020-03-10 ENCOUNTER — PATIENT MESSAGE (OUTPATIENT)
Dept: RHEUMATOLOGY | Facility: CLINIC | Age: 50
End: 2020-03-10

## 2020-04-03 ENCOUNTER — OFFICE VISIT (OUTPATIENT)
Dept: RHEUMATOLOGY | Facility: CLINIC | Age: 50
End: 2020-04-03
Payer: COMMERCIAL

## 2020-04-03 ENCOUNTER — PATIENT MESSAGE (OUTPATIENT)
Dept: RHEUMATOLOGY | Facility: CLINIC | Age: 50
End: 2020-04-03

## 2020-04-03 DIAGNOSIS — L40.8 PSORIASIS WITH PUSTULES: ICD-10-CM

## 2020-04-03 DIAGNOSIS — G89.4 CHRONIC PAIN SYNDROME: ICD-10-CM

## 2020-04-03 DIAGNOSIS — F41.1 GENERALIZED ANXIETY DISORDER: ICD-10-CM

## 2020-04-03 DIAGNOSIS — L40.50 PSORIATIC ARTHRITIS: ICD-10-CM

## 2020-04-03 DIAGNOSIS — K51.919 ULCERATIVE COLITIS WITH COMPLICATION, UNSPECIFIED LOCATION: ICD-10-CM

## 2020-04-03 DIAGNOSIS — J32.9 SINUSITIS, UNSPECIFIED CHRONICITY, UNSPECIFIED LOCATION: Primary | ICD-10-CM

## 2020-04-03 DIAGNOSIS — M47.817 LUMBAR AND SACRAL OSTEOARTHRITIS: ICD-10-CM

## 2020-04-03 PROCEDURE — 99214 OFFICE O/P EST MOD 30 MIN: CPT | Mod: 95,,, | Performed by: INTERNAL MEDICINE

## 2020-04-03 PROCEDURE — 99214 PR OFFICE/OUTPT VISIT, EST, LEVL IV, 30-39 MIN: ICD-10-PCS | Mod: 95,,, | Performed by: INTERNAL MEDICINE

## 2020-04-03 RX ORDER — ESCITALOPRAM OXALATE 10 MG/1
10 TABLET ORAL DAILY
Qty: 90 TABLET | Refills: 1 | Status: SHIPPED | OUTPATIENT
Start: 2020-04-03 | End: 2021-03-05

## 2020-04-03 RX ORDER — PREDNISONE 2.5 MG/1
5 TABLET ORAL DAILY
Qty: 60 TABLET | Refills: 3 | Status: SHIPPED | OUTPATIENT
Start: 2020-04-03 | End: 2020-05-03

## 2020-04-03 RX ORDER — HYDROCODONE BITARTRATE AND ACETAMINOPHEN 7.5; 325 MG/1; MG/1
1 TABLET ORAL EVERY 8 HOURS PRN
Qty: 90 TABLET | Refills: 0 | Status: SHIPPED | OUTPATIENT
Start: 2020-04-03 | End: 2020-05-21 | Stop reason: SDUPTHER

## 2020-04-03 RX ORDER — AZITHROMYCIN 250 MG/1
TABLET, FILM COATED ORAL
Qty: 6 TABLET | Refills: 0 | Status: SHIPPED | OUTPATIENT
Start: 2020-04-03 | End: 2020-07-20

## 2020-04-03 NOTE — PROGRESS NOTES
Subjective:       Patient ID: Osiris Billingsley is a 49 y.o. female.    Chief Complaint: Disease Management and Pain    Follow up: 48 year on rheumatoid arthritis. Celiac and now is biopsy proven 2017, she had uveitis x 3 day She complains of R hand numbness, increasing swelling in her hands as well as L midfoot swelling and pain. She had a joint flare, and  Kidney issues, held humira,. Patient complains of arthralgias and myalgias for which has been present for a few years. Pain is located in multiple joints, both shoulder(s), both elbow(s), both wrist(s), both MCP(s): 1st, 2nd, 3rd, 4th and 5th, both PIP(s): 1st, 2nd, 3rd, 4th and 5th, both DIP(s): 1st and 2nd, both hip(s), both knee(s) and both MTP(s): 1st, 2nd, 3rd, 4th and 5th, is described as aching, pulsating, shooting and throbbing, and is constant, moderate .  Associated symptoms include: crepitation, decreased range of motion, edema, effusion, tenderness and warmth.     Review of Systems   Constitutional: Positive for fatigue. Negative for activity change and appetite change.   HENT: Negative for postnasal drip, rhinorrhea and sinus pain.    Eyes: Negative for visual disturbance.   Respiratory: Negative for shortness of breath.    Cardiovascular: Negative for palpitations and leg swelling.   Gastrointestinal: Negative for constipation and diarrhea.   Musculoskeletal: Positive for arthralgias, back pain, neck pain and neck stiffness.   Allergic/Immunologic: Positive for immunocompromised state.   Neurological: Negative for dizziness and light-headedness.         Objective:     There were no vitals filed for this visit.     Physical Exam   Constitutional: She is oriented to person, place, and time and well-developed, well-nourished, and in no distress.   HENT:   uveitis   Neurological: She is alert and oriented to person, place, and time.   Psychiatric: Mood, affect and judgment normal.      Results for orders placed or performed in visit on 10/23/19    Urine culture   Result Value Ref Range    Urine Culture, Routine No growth    Urinalysis   Result Value Ref Range    Specimen UA Urine, Clean Catch     Color, UA Yellow Yellow, Straw, Loretta    Appearance, UA Clear Clear    pH, UA 6.0 5.0 - 8.0    Specific Gravity, UA >=1.030 (A) 1.005 - 1.030    Protein, UA Negative Negative    Glucose, UA Negative Negative    Ketones, UA Negative Negative    Bilirubin (UA) Negative Negative    Occult Blood UA Negative Negative    Nitrite, UA Negative Negative    Leukocytes, UA Negative Negative       Assessment:       1. Sinusitis, unspecified chronicity, unspecified location    2. Psoriatic arthritis    3. Ulcerative colitis with complication, unspecified location    4. Lumbar and sacral osteoarthritis    5. Psoriasis with pustules    6. Chronic pain syndrome            Plan:       Sinusitis, unspecified chronicity, unspecified location  -     HYDROcodone-acetaminophen (NORCO) 7.5-325 mg per tablet; Take 1 tablet by mouth every 8 (eight) hours as needed for Pain.  Dispense: 90 tablet; Refill: 0  -     azithromycin (Z-JANNY) 250 MG tablet; Take 2 tablets by mouth on day 1; Take 1 tablet by mouth on days 2-5  Dispense: 6 tablet; Refill: 0  -     CBC auto differential; Future; Expected date: 04/05/2020  -     Comprehensive metabolic panel; Future; Expected date: 04/05/2020  -     C-Reactive Protein; Future; Expected date: 04/05/2020  -     Sedimentation rate; Future; Expected date: 04/05/2020    Psoriatic arthritis  -     HYDROcodone-acetaminophen (NORCO) 7.5-325 mg per tablet; Take 1 tablet by mouth every 8 (eight) hours as needed for Pain.  Dispense: 90 tablet; Refill: 0  -     azithromycin (Z-JANNY) 250 MG tablet; Take 2 tablets by mouth on day 1; Take 1 tablet by mouth on days 2-5  Dispense: 6 tablet; Refill: 0  -     predniSONE (DELTASONE) 2.5 MG tablet; Take 2 tablets (5 mg total) by mouth once daily.  Dispense: 60 tablet; Refill: 3    Ulcerative colitis with complication,  unspecified location  -     HYDROcodone-acetaminophen (NORCO) 7.5-325 mg per tablet; Take 1 tablet by mouth every 8 (eight) hours as needed for Pain.  Dispense: 90 tablet; Refill: 0  -     azithromycin (Z-JANNY) 250 MG tablet; Take 2 tablets by mouth on day 1; Take 1 tablet by mouth on days 2-5  Dispense: 6 tablet; Refill: 0  -     predniSONE (DELTASONE) 2.5 MG tablet; Take 2 tablets (5 mg total) by mouth once daily.  Dispense: 60 tablet; Refill: 3  -     CBC auto differential; Future; Expected date: 04/05/2020  -     Comprehensive metabolic panel; Future; Expected date: 04/05/2020  -     C-Reactive Protein; Future; Expected date: 04/05/2020  -     Sedimentation rate; Future; Expected date: 04/05/2020    Lumbar and sacral osteoarthritis  -     HYDROcodone-acetaminophen (NORCO) 7.5-325 mg per tablet; Take 1 tablet by mouth every 8 (eight) hours as needed for Pain.  Dispense: 90 tablet; Refill: 0  -     azithromycin (Z-JANNY) 250 MG tablet; Take 2 tablets by mouth on day 1; Take 1 tablet by mouth on days 2-5  Dispense: 6 tablet; Refill: 0  -     predniSONE (DELTASONE) 2.5 MG tablet; Take 2 tablets (5 mg total) by mouth once daily.  Dispense: 60 tablet; Refill: 3  -     CBC auto differential; Future; Expected date: 04/05/2020  -     Comprehensive metabolic panel; Future; Expected date: 04/05/2020  -     C-Reactive Protein; Future; Expected date: 04/05/2020  -     Sedimentation rate; Future; Expected date: 04/05/2020    Psoriasis with pustules  -     HYDROcodone-acetaminophen (NORCO) 7.5-325 mg per tablet; Take 1 tablet by mouth every 8 (eight) hours as needed for Pain.  Dispense: 90 tablet; Refill: 0  -     azithromycin (Z-JANNY) 250 MG tablet; Take 2 tablets by mouth on day 1; Take 1 tablet by mouth on days 2-5  Dispense: 6 tablet; Refill: 0  -     CBC auto differential; Future; Expected date: 04/05/2020  -     Comprehensive metabolic panel; Future; Expected date: 04/05/2020  -     C-Reactive Protein; Future; Expected date:  04/05/2020  -     Sedimentation rate; Future; Expected date: 04/05/2020    Chronic pain syndrome  -     HYDROcodone-acetaminophen (NORCO) 7.5-325 mg per tablet; Take 1 tablet by mouth every 8 (eight) hours as needed for Pain.  Dispense: 90 tablet; Refill: 0  -     azithromycin (Z-JANNY) 250 MG tablet; Take 2 tablets by mouth on day 1; Take 1 tablet by mouth on days 2-5  Dispense: 6 tablet; Refill: 0  -     predniSONE (DELTASONE) 2.5 MG tablet; Take 2 tablets (5 mg total) by mouth once daily.  Dispense: 60 tablet; Refill: 3          I have  check [place] prescription monitoring program site and no unusual or abnormal behavior has occurred pt understand the risk and benefits of taking opioid medications and has decided to continue the  Medication.  Patient will restart her [place] marrow once pandemic has improved  Patient is aware that there is a COVID B-19 pandemic and that the immunosuppressants being taken to treat the arthritis but can increased the  risk for infection/Viruses.  It is understood to hold all DMARD with the exception of Plaquenil,  if having fever chills, cough, shortness of breath loss of sense of smell and or myalgias.  It is understood to call the office as well as call their primary care physician and observe  social isolation    The patient location is:  home  The chief complaint leading to consultation is:  Psoriatic Crohn's disease psoriatic arthritis  Visit type: Virtual visit with synchronous audio and video  Total time spent with patient:  25 min  Each patient to whom he or she provides medical services by telemedicine is:  (1) informed of the relationship between the physician and patient and the respective role of any other health care provider with respect to management of the patient; and (2) notified that he or she may decline to receive medical services by telemedicine and may withdraw from such care at any time.

## 2020-04-05 ENCOUNTER — PATIENT MESSAGE (OUTPATIENT)
Dept: RHEUMATOLOGY | Facility: CLINIC | Age: 50
End: 2020-04-05

## 2020-05-07 DIAGNOSIS — G47.00 INSOMNIA, UNSPECIFIED TYPE: ICD-10-CM

## 2020-05-08 RX ORDER — TRAZODONE HYDROCHLORIDE 100 MG/1
TABLET ORAL
Qty: 90 TABLET | Refills: 3 | Status: SHIPPED | OUTPATIENT
Start: 2020-05-08 | End: 2021-06-03 | Stop reason: SDUPTHER

## 2020-05-21 ENCOUNTER — PATIENT MESSAGE (OUTPATIENT)
Dept: RHEUMATOLOGY | Facility: CLINIC | Age: 50
End: 2020-05-21

## 2020-05-21 DIAGNOSIS — M47.817 LUMBAR AND SACRAL OSTEOARTHRITIS: ICD-10-CM

## 2020-05-21 DIAGNOSIS — K51.919 ULCERATIVE COLITIS WITH COMPLICATION, UNSPECIFIED LOCATION: ICD-10-CM

## 2020-05-21 DIAGNOSIS — G89.4 CHRONIC PAIN SYNDROME: ICD-10-CM

## 2020-05-21 DIAGNOSIS — J32.9 SINUSITIS, UNSPECIFIED CHRONICITY, UNSPECIFIED LOCATION: ICD-10-CM

## 2020-05-21 DIAGNOSIS — L40.50 PSORIATIC ARTHRITIS: ICD-10-CM

## 2020-05-21 DIAGNOSIS — L40.8 PSORIASIS WITH PUSTULES: ICD-10-CM

## 2020-05-22 RX ORDER — HYDROCODONE BITARTRATE AND ACETAMINOPHEN 7.5; 325 MG/1; MG/1
1 TABLET ORAL EVERY 8 HOURS PRN
Qty: 90 TABLET | Refills: 0 | Status: SHIPPED | OUTPATIENT
Start: 2020-05-22 | End: 2020-06-21

## 2020-07-08 DIAGNOSIS — G89.4 CHRONIC PAIN SYNDROME: Primary | ICD-10-CM

## 2020-07-08 RX ORDER — HYDROCODONE BITARTRATE AND ACETAMINOPHEN 5; 325 MG/1; MG/1
1 TABLET ORAL EVERY 6 HOURS PRN
Refills: 0 | Status: CANCELLED | OUTPATIENT
Start: 2020-07-08

## 2020-07-19 RX ORDER — HYDROCODONE BITARTRATE AND ACETAMINOPHEN 7.5; 325 MG/1; MG/1
1 TABLET ORAL EVERY 8 HOURS PRN
Qty: 90 TABLET | Refills: 0 | Status: SHIPPED | OUTPATIENT
Start: 2020-07-19 | End: 2020-07-20 | Stop reason: SDUPTHER

## 2020-07-20 ENCOUNTER — LAB VISIT (OUTPATIENT)
Dept: LAB | Facility: HOSPITAL | Age: 50
End: 2020-07-20
Attending: INTERNAL MEDICINE
Payer: COMMERCIAL

## 2020-07-20 ENCOUNTER — OFFICE VISIT (OUTPATIENT)
Dept: RHEUMATOLOGY | Facility: CLINIC | Age: 50
End: 2020-07-20
Payer: COMMERCIAL

## 2020-07-20 VITALS
WEIGHT: 130 LBS | HEIGHT: 60 IN | BODY MASS INDEX: 25.52 KG/M2 | SYSTOLIC BLOOD PRESSURE: 123 MMHG | DIASTOLIC BLOOD PRESSURE: 74 MMHG | HEART RATE: 64 BPM

## 2020-07-20 DIAGNOSIS — G89.4 CHRONIC PAIN SYNDROME: ICD-10-CM

## 2020-07-20 DIAGNOSIS — M47.817 LUMBAR AND SACRAL OSTEOARTHRITIS: ICD-10-CM

## 2020-07-20 DIAGNOSIS — H20.9 UVEITIS: ICD-10-CM

## 2020-07-20 DIAGNOSIS — L40.8 PSORIASIS WITH PUSTULES: ICD-10-CM

## 2020-07-20 DIAGNOSIS — J32.9 SINUSITIS, UNSPECIFIED CHRONICITY, UNSPECIFIED LOCATION: ICD-10-CM

## 2020-07-20 DIAGNOSIS — M06.00 SERONEGATIVE RHEUMATOID ARTHRITIS: ICD-10-CM

## 2020-07-20 DIAGNOSIS — L40.50 PSORIATIC ARTHRITIS: Primary | ICD-10-CM

## 2020-07-20 DIAGNOSIS — K51.919 ULCERATIVE COLITIS WITH COMPLICATION, UNSPECIFIED LOCATION: ICD-10-CM

## 2020-07-20 DIAGNOSIS — L40.50 PSORIATIC ARTHRITIS: ICD-10-CM

## 2020-07-20 LAB
ALBUMIN SERPL BCP-MCNC: 4.5 G/DL (ref 3.5–5.2)
ALP SERPL-CCNC: 55 U/L (ref 55–135)
ALT SERPL W/O P-5'-P-CCNC: 15 U/L (ref 10–44)
ANION GAP SERPL CALC-SCNC: 7 MMOL/L (ref 8–16)
AST SERPL-CCNC: 18 U/L (ref 10–40)
BASOPHILS # BLD AUTO: 0.03 K/UL (ref 0–0.2)
BASOPHILS NFR BLD: 0.7 % (ref 0–1.9)
BILIRUB SERPL-MCNC: 0.5 MG/DL (ref 0.1–1)
BUN SERPL-MCNC: 19 MG/DL (ref 6–20)
CALCIUM SERPL-MCNC: 9.7 MG/DL (ref 8.7–10.5)
CHLORIDE SERPL-SCNC: 102 MMOL/L (ref 95–110)
CO2 SERPL-SCNC: 30 MMOL/L (ref 23–29)
CREAT SERPL-MCNC: 0.8 MG/DL (ref 0.5–1.4)
CRP SERPL-MCNC: 0.5 MG/L (ref 0–8.2)
DIFFERENTIAL METHOD: ABNORMAL
EOSINOPHIL # BLD AUTO: 0.1 K/UL (ref 0–0.5)
EOSINOPHIL NFR BLD: 2.8 % (ref 0–8)
ERYTHROCYTE [DISTWIDTH] IN BLOOD BY AUTOMATED COUNT: 12.8 % (ref 11.5–14.5)
ERYTHROCYTE [SEDIMENTATION RATE] IN BLOOD BY WESTERGREN METHOD: 14 MM/HR (ref 0–20)
EST. GFR  (AFRICAN AMERICAN): >60 ML/MIN/1.73 M^2
EST. GFR  (NON AFRICAN AMERICAN): >60 ML/MIN/1.73 M^2
GLUCOSE SERPL-MCNC: 96 MG/DL (ref 70–110)
HCT VFR BLD AUTO: 42.9 % (ref 37–48.5)
HGB BLD-MCNC: 13.2 G/DL (ref 12–16)
IMM GRANULOCYTES # BLD AUTO: 0 K/UL (ref 0–0.04)
IMM GRANULOCYTES NFR BLD AUTO: 0 % (ref 0–0.5)
LYMPHOCYTES # BLD AUTO: 1.8 K/UL (ref 1–4.8)
LYMPHOCYTES NFR BLD: 42.8 % (ref 18–48)
MCH RBC QN AUTO: 29.9 PG (ref 27–31)
MCHC RBC AUTO-ENTMCNC: 30.8 G/DL (ref 32–36)
MCV RBC AUTO: 97 FL (ref 82–98)
MONOCYTES # BLD AUTO: 0.3 K/UL (ref 0.3–1)
MONOCYTES NFR BLD: 6.7 % (ref 4–15)
NEUTROPHILS # BLD AUTO: 2 K/UL (ref 1.8–7.7)
NEUTROPHILS NFR BLD: 47 % (ref 38–73)
NRBC BLD-RTO: 0 /100 WBC
PLATELET # BLD AUTO: 274 K/UL (ref 150–350)
PMV BLD AUTO: 11.1 FL (ref 9.2–12.9)
POTASSIUM SERPL-SCNC: 4.2 MMOL/L (ref 3.5–5.1)
PROT SERPL-MCNC: 7.8 G/DL (ref 6–8.4)
RBC # BLD AUTO: 4.41 M/UL (ref 4–5.4)
SODIUM SERPL-SCNC: 139 MMOL/L (ref 136–145)
WBC # BLD AUTO: 4.3 K/UL (ref 3.9–12.7)

## 2020-07-20 PROCEDURE — 3008F BODY MASS INDEX DOCD: CPT | Mod: CPTII,S$GLB,, | Performed by: INTERNAL MEDICINE

## 2020-07-20 PROCEDURE — 80053 COMPREHEN METABOLIC PANEL: CPT

## 2020-07-20 PROCEDURE — 36415 COLL VENOUS BLD VENIPUNCTURE: CPT | Mod: PO

## 2020-07-20 PROCEDURE — 85651 RBC SED RATE NONAUTOMATED: CPT | Mod: PO

## 2020-07-20 PROCEDURE — 99999 PR PBB SHADOW E&M-EST. PATIENT-LVL III: ICD-10-PCS | Mod: PBBFAC,,, | Performed by: INTERNAL MEDICINE

## 2020-07-20 PROCEDURE — 99215 OFFICE O/P EST HI 40 MIN: CPT | Mod: 25,S$GLB,, | Performed by: INTERNAL MEDICINE

## 2020-07-20 PROCEDURE — 96372 PR INJECTION,THERAP/PROPH/DIAG2ST, IM OR SUBCUT: ICD-10-PCS | Mod: S$GLB,,, | Performed by: INTERNAL MEDICINE

## 2020-07-20 PROCEDURE — 3008F PR BODY MASS INDEX (BMI) DOCUMENTED: ICD-10-PCS | Mod: CPTII,S$GLB,, | Performed by: INTERNAL MEDICINE

## 2020-07-20 PROCEDURE — 99215 PR OFFICE/OUTPT VISIT, EST, LEVL V, 40-54 MIN: ICD-10-PCS | Mod: 25,S$GLB,, | Performed by: INTERNAL MEDICINE

## 2020-07-20 PROCEDURE — 87340 HEPATITIS B SURFACE AG IA: CPT

## 2020-07-20 PROCEDURE — 85025 COMPLETE CBC W/AUTO DIFF WBC: CPT

## 2020-07-20 PROCEDURE — 86803 HEPATITIS C AB TEST: CPT

## 2020-07-20 PROCEDURE — 86140 C-REACTIVE PROTEIN: CPT

## 2020-07-20 PROCEDURE — 96372 THER/PROPH/DIAG INJ SC/IM: CPT | Mod: S$GLB,,, | Performed by: INTERNAL MEDICINE

## 2020-07-20 PROCEDURE — 99999 PR PBB SHADOW E&M-EST. PATIENT-LVL III: CPT | Mod: PBBFAC,,, | Performed by: INTERNAL MEDICINE

## 2020-07-20 PROCEDURE — 86480 TB TEST CELL IMMUN MEASURE: CPT

## 2020-07-20 PROCEDURE — 86704 HEP B CORE ANTIBODY TOTAL: CPT

## 2020-07-20 RX ORDER — ONDANSETRON 4 MG/1
4 TABLET, ORALLY DISINTEGRATING ORAL EVERY 8 HOURS PRN
COMMUNITY
Start: 2020-05-05 | End: 2020-07-20 | Stop reason: SDUPTHER

## 2020-07-20 RX ORDER — KETOROLAC TROMETHAMINE 30 MG/ML
60 INJECTION, SOLUTION INTRAMUSCULAR; INTRAVENOUS
Status: COMPLETED | OUTPATIENT
Start: 2020-07-20 | End: 2020-07-20

## 2020-07-20 RX ORDER — ONDANSETRON 4 MG/1
4 TABLET, ORALLY DISINTEGRATING ORAL EVERY 8 HOURS PRN
Qty: 45 TABLET | Refills: 3 | Status: SHIPPED | OUTPATIENT
Start: 2020-07-20 | End: 2020-08-19

## 2020-07-20 RX ORDER — METHYLPREDNISOLONE ACETATE 80 MG/ML
160 INJECTION, SUSPENSION INTRA-ARTICULAR; INTRALESIONAL; INTRAMUSCULAR; SOFT TISSUE
Status: COMPLETED | OUTPATIENT
Start: 2020-07-20 | End: 2020-07-20

## 2020-07-20 RX ORDER — HYDROCODONE BITARTRATE AND ACETAMINOPHEN 7.5; 325 MG/1; MG/1
1 TABLET ORAL EVERY 8 HOURS PRN
Qty: 90 TABLET | Refills: 0 | Status: SHIPPED | OUTPATIENT
Start: 2020-09-19 | End: 2020-10-19

## 2020-07-20 RX ORDER — HYDROCODONE BITARTRATE AND ACETAMINOPHEN 7.5; 325 MG/1; MG/1
1 TABLET ORAL EVERY 8 HOURS PRN
Qty: 90 TABLET | Refills: 0 | Status: SHIPPED | OUTPATIENT
Start: 2020-08-19 | End: 2020-07-20 | Stop reason: SDUPTHER

## 2020-07-20 RX ADMIN — KETOROLAC TROMETHAMINE 60 MG: 30 INJECTION, SOLUTION INTRAMUSCULAR; INTRAVENOUS at 12:07

## 2020-07-20 RX ADMIN — METHYLPREDNISOLONE ACETATE 160 MG: 80 INJECTION, SUSPENSION INTRA-ARTICULAR; INTRALESIONAL; INTRAMUSCULAR; SOFT TISSUE at 12:07

## 2020-07-20 ASSESSMENT — ROUTINE ASSESSMENT OF PATIENT INDEX DATA (RAPID3)
TOTAL RAPID3 SCORE: 5.5
FATIGUE SCORE: 1.1
PATIENT GLOBAL ASSESSMENT SCORE: 5
MDHAQ FUNCTION SCORE: 1.2
PAIN SCORE: 7.5
PSYCHOLOGICAL DISTRESS SCORE: 4.4

## 2020-07-20 NOTE — PROGRESS NOTES
Administered 2 cc ( 80 mg/ml ) of depomedrol to the right upper outer gluteal. Informed of s/s to report verbalized understanding. No adverse reactions noted.        Administered 2 cc ( 30 mg/ml ) of toradol to the right upper outer gluteal. Informed of s/s to report verbalized understanding. No adverse reactions noted.

## 2020-07-20 NOTE — PROGRESS NOTES
Subjective:       Patient ID: Osiris Billingsley is a 49 y.o. female.    Chief Complaint: Disease Management and Psoriatic Arthritis    Follow up: 48 year on rheumatoid arthritis, PSA,  And nausea off tx 4 months . Celiac and now is biopsy proven 2017,  She had a joint flare, and  Kidney issues, held humira,. Patient complains of arthralgias and myalgias for which has been present for a few years. Pain is located in multiple joints, both shoulder(s), both elbow(s), both wrist(s), both MCP(s): 1st, 2nd, 3rd, 4th and 5th, both PIP(s): 1st, 2nd, 3rd, 4th and 5th, both DIP(s): 1st and 2nd, both hip(s), both knee(s) and both MTP(s): 1st, 2nd, 3rd, 4th and 5th, is described as aching, pulsating, shooting and throbbing, and is constant, moderate .             She complains of joint swelling. Associated symptoms include fatigue.         Review of Systems   Constitutional: Positive for activity change and fatigue. Negative for appetite change.   HENT: Negative for postnasal drip, rhinorrhea and sinus pain.    Eyes: Negative for visual disturbance.   Respiratory: Negative for shortness of breath.    Cardiovascular: Negative for palpitations and leg swelling.   Gastrointestinal: Negative for constipation and diarrhea.   Musculoskeletal: Positive for arthralgias, back pain, joint swelling, neck pain and neck stiffness.   Allergic/Immunologic: Positive for immunocompromised state.   Neurological: Negative for dizziness and light-headedness.         Objective:     Vitals:    07/20/20 1001   BP: 123/74   Pulse: 64        Physical Exam   Vitals reviewed.  Constitutional: She is oriented to person, place, and time and well-developed, well-nourished, and in no distress.   HENT:   Head: Normocephalic and atraumatic.   Mouth/Throat: Oropharynx is clear and moist.   Eyes: EOM are normal. Pupils are equal, round, and reactive to light.   Neck: Neck supple. No thyromegaly present.   Cardiovascular: Normal rate, regular rhythm and normal  heart sounds.  Exam reveals no gallop and no friction rub.    No murmur heard.  Pulmonary/Chest: Breath sounds normal. She has no wheezes. She has no rales. She exhibits no tenderness.   Abdominal: There is no abdominal tenderness. There is no rebound and no guarding.       Right Side Rheumatological Exam     Examination finds the elbow, 1st MCP, 2nd MCP, 3rd MCP, 4th MCP and 5th MCP normal.    The patient is tender to palpation of the shoulder and knee    She has swelling of the knee    The patient has an enlarged wrist, 1st PIP, 2nd PIP, 3rd PIP, 4th PIP and 5th PIP    Shoulder Exam   Tenderness Location: acromion    Range of Motion   Active abduction: abnormal   Adduction: abnormal  Sensation: normal    Knee Exam   Tenderness Location: medial joint line  Patellofemoral Crepitus: positive  Effusion: positive  Sensation: normal    Hip Exam   Tenderness Location: no tenderness  Sensation: normal    Elbow/Wrist Exam   Tenderness Location: no tenderness  Sensation: normal    Left Side Rheumatological Exam     Examination finds the elbow, knee, 1st MCP, 2nd MCP, 3rd MCP, 4th MCP and 5th MCP normal.    The patient is tender to palpation of the shoulder.    The patient has an enlarged wrist, 1st PIP, 2nd PIP, 3rd PIP, 4th PIP and 5th PIP.    Shoulder Exam   Tenderness Location: acromion    Range of Motion   Active abduction: abnormal   Sensation: normal    Knee Exam   Tenderness Location: lateral joint line and medial joint line    Patellofemoral Crepitus: positive  Effusion: positive  Sensation: normal    Hip Exam   Tenderness Location: no tenderness  Sensation: normal    Elbow/Wrist Exam   Sensation: normal      Back/Neck Exam   General Inspection   Gait: normal       Tenderness Right paramedian tenderness of the Lower C-Spine and Lower L-Spine.Left paramedian tenderness of the Upper C-Spine and Lower L-Spine.      Lymphadenopathy:     She has no cervical adenopathy.   Neurological: She is alert and oriented to person,  place, and time. She has normal sensation and normal strength. Gait normal.   Reflex Scores:       Patellar reflexes are 3+ on the right side and 3+ on the left side.  Skin: No rash noted. No erythema. No pallor.     Psychiatric: Mood and affect normal.   Musculoskeletal: Tenderness and deformity present. No edema.        Results for orders placed or performed in visit on 10/23/19   Urine culture    Specimen: Urine, Clean Catch   Result Value Ref Range    Urine Culture, Routine No growth    Urinalysis   Result Value Ref Range    Specimen UA Urine, Clean Catch     Color, UA Yellow Yellow, Straw, Loretta    Appearance, UA Clear Clear    pH, UA 6.0 5.0 - 8.0    Specific Gravity, UA >=1.030 (A) 1.005 - 1.030    Protein, UA Negative Negative    Glucose, UA Negative Negative    Ketones, UA Negative Negative    Bilirubin (UA) Negative Negative    Occult Blood UA Negative Negative    Nitrite, UA Negative Negative    Leukocytes, UA Negative Negative       Assessment:       1. Psoriatic arthritis    2. Ulcerative colitis with complication, unspecified location    3. Lumbar and sacral osteoarthritis    4. Uveitis    5. Chronic pain syndrome            Plan:       Psoriatic arthritis  -     methylPREDNISolone acetate injection 160 mg  -     ketorolac injection 60 mg  -     ondansetron (ZOFRAN-ODT) 4 MG TbDL; Take 1 tablet (4 mg total) by mouth every 8 (eight) hours as needed.  Dispense: 45 tablet; Refill: 3    Ulcerative colitis with complication, unspecified location  -     methylPREDNISolone acetate injection 160 mg  -     ketorolac injection 60 mg  -     ondansetron (ZOFRAN-ODT) 4 MG TbDL; Take 1 tablet (4 mg total) by mouth every 8 (eight) hours as needed.  Dispense: 45 tablet; Refill: 3    Lumbar and sacral osteoarthritis  -     methylPREDNISolone acetate injection 160 mg  -     ketorolac injection 60 mg  -     ondansetron (ZOFRAN-ODT) 4 MG TbDL; Take 1 tablet (4 mg total) by mouth every 8 (eight) hours as needed.  Dispense:  45 tablet; Refill: 3    Uveitis  -     methylPREDNISolone acetate injection 160 mg  -     ketorolac injection 60 mg  -     ondansetron (ZOFRAN-ODT) 4 MG TbDL; Take 1 tablet (4 mg total) by mouth every 8 (eight) hours as needed.  Dispense: 45 tablet; Refill: 3    Chronic pain syndrome  -     Discontinue: HYDROcodone-acetaminophen (NORCO) 7.5-325 mg per tablet; Take 1 tablet by mouth every 8 (eight) hours as needed for Pain.  Dispense: 90 tablet; Refill: 0  -     HYDROcodone-acetaminophen (NORCO) 7.5-325 mg per tablet; Take 1 tablet by mouth every 8 (eight) hours as needed for Pain.  Dispense: 90 tablet; Refill: 0          1.Start humira  2. Continue pain control  3. depomedrol and toradol im for her flare  4. I have  check [place] prescription monitoring program site and no unusual or abnormal behavior has occurred pt understand the risk and benefits of taking opioid medications and has decided to continue the  medication

## 2020-07-21 LAB
HBV CORE AB SERPL QL IA: NEGATIVE
HBV SURFACE AG SERPL QL IA: NEGATIVE
HCV AB SERPL QL IA: NEGATIVE

## 2020-07-22 LAB
GAMMA INTERFERON BACKGROUND BLD IA-ACNC: 0.02 IU/ML
M TB IFN-G CD4+ BCKGRND COR BLD-ACNC: 0.01 IU/ML
MITOGEN IGNF BCKGRD COR BLD-ACNC: 6.83 IU/ML
TB GOLD PLUS: NEGATIVE
TB2 - NIL: 0 IU/ML

## 2020-07-27 DIAGNOSIS — M06.00 SERONEGATIVE RHEUMATOID ARTHRITIS: ICD-10-CM

## 2020-07-27 DIAGNOSIS — M47.817 LUMBAR AND SACRAL OSTEOARTHRITIS: ICD-10-CM

## 2020-07-27 DIAGNOSIS — L40.50 PSORIATIC ARTHRITIS: ICD-10-CM

## 2020-07-27 DIAGNOSIS — K51.919 ULCERATIVE COLITIS WITH COMPLICATION, UNSPECIFIED LOCATION: ICD-10-CM

## 2020-07-27 DIAGNOSIS — K21.00 GERD WITH ESOPHAGITIS: ICD-10-CM

## 2020-07-28 RX ORDER — CLONAZEPAM 0.5 MG/1
TABLET ORAL
Qty: 60 TABLET | Refills: 1 | Status: SHIPPED | OUTPATIENT
Start: 2020-07-28 | End: 2021-01-07 | Stop reason: SDUPTHER

## 2020-08-03 ENCOUNTER — OCCUPATIONAL HEALTH (OUTPATIENT)
Dept: URGENT CARE | Facility: CLINIC | Age: 50
End: 2020-08-03

## 2020-08-03 VITALS — TEMPERATURE: 98 F | OXYGEN SATURATION: 98 %

## 2020-08-03 DIAGNOSIS — Z02.83 ENCOUNTER FOR DRUG SCREENING: Primary | ICD-10-CM

## 2020-08-03 LAB
CTP QC/QA: YES
POC 10 PANEL DRUG SCREEN: NEGATIVE

## 2020-08-03 PROCEDURE — 80305 POCT RAPID DRUG SCREEN 10 PANEL: ICD-10-PCS | Mod: QW,S$GLB,, | Performed by: FAMILY MEDICINE

## 2020-08-03 PROCEDURE — 80305 DRUG TEST PRSMV DIR OPT OBS: CPT | Mod: QW,S$GLB,, | Performed by: FAMILY MEDICINE

## 2020-10-14 ENCOUNTER — PATIENT MESSAGE (OUTPATIENT)
Dept: RHEUMATOLOGY | Facility: CLINIC | Age: 50
End: 2020-10-14

## 2020-10-16 ENCOUNTER — CLINICAL SUPPORT (OUTPATIENT)
Dept: RHEUMATOLOGY | Facility: CLINIC | Age: 50
End: 2020-10-16
Payer: COMMERCIAL

## 2020-10-16 ENCOUNTER — LAB VISIT (OUTPATIENT)
Dept: LAB | Facility: HOSPITAL | Age: 50
End: 2020-10-16
Attending: INTERNAL MEDICINE
Payer: COMMERCIAL

## 2020-10-16 ENCOUNTER — PATIENT MESSAGE (OUTPATIENT)
Dept: RHEUMATOLOGY | Facility: CLINIC | Age: 50
End: 2020-10-16

## 2020-10-16 VITALS — DIASTOLIC BLOOD PRESSURE: 73 MMHG | SYSTOLIC BLOOD PRESSURE: 121 MMHG | HEART RATE: 64 BPM

## 2020-10-16 DIAGNOSIS — L40.50 PSORIATIC ARTHRITIS: Primary | ICD-10-CM

## 2020-10-16 DIAGNOSIS — M06.00 SERONEGATIVE RHEUMATOID ARTHRITIS: ICD-10-CM

## 2020-10-16 DIAGNOSIS — N20.0 KIDNEY STONES: Primary | ICD-10-CM

## 2020-10-16 DIAGNOSIS — G89.4 CHRONIC PAIN SYNDROME: ICD-10-CM

## 2020-10-16 DIAGNOSIS — N20.0 KIDNEY STONES: ICD-10-CM

## 2020-10-16 PROCEDURE — 81003 URINALYSIS AUTO W/O SCOPE: CPT | Mod: PO

## 2020-10-16 PROCEDURE — 87086 URINE CULTURE/COLONY COUNT: CPT

## 2020-10-16 PROCEDURE — 99999 PR PBB SHADOW E&M-EST. PATIENT-LVL III: CPT | Mod: PBBFAC,,,

## 2020-10-16 PROCEDURE — 96372 PR INJECTION,THERAP/PROPH/DIAG2ST, IM OR SUBCUT: ICD-10-PCS | Mod: S$GLB,,, | Performed by: INTERNAL MEDICINE

## 2020-10-16 PROCEDURE — 96372 THER/PROPH/DIAG INJ SC/IM: CPT | Mod: S$GLB,,, | Performed by: INTERNAL MEDICINE

## 2020-10-16 PROCEDURE — 99999 PR PBB SHADOW E&M-EST. PATIENT-LVL III: ICD-10-PCS | Mod: PBBFAC,,,

## 2020-10-16 RX ORDER — KETOROLAC TROMETHAMINE 30 MG/ML
60 INJECTION, SOLUTION INTRAMUSCULAR; INTRAVENOUS
Status: COMPLETED | OUTPATIENT
Start: 2020-10-16 | End: 2020-10-16

## 2020-10-16 RX ORDER — CYANOCOBALAMIN 1000 UG/ML
1000 INJECTION, SOLUTION INTRAMUSCULAR; SUBCUTANEOUS
Status: COMPLETED | OUTPATIENT
Start: 2020-10-16 | End: 2020-10-16

## 2020-10-16 RX ORDER — METHYLPREDNISOLONE ACETATE 80 MG/ML
160 INJECTION, SUSPENSION INTRA-ARTICULAR; INTRALESIONAL; INTRAMUSCULAR; SOFT TISSUE
Status: COMPLETED | OUTPATIENT
Start: 2020-10-16 | End: 2020-10-16

## 2020-10-16 RX ADMIN — METHYLPREDNISOLONE ACETATE 160 MG: 80 INJECTION, SUSPENSION INTRA-ARTICULAR; INTRALESIONAL; INTRAMUSCULAR; SOFT TISSUE at 01:10

## 2020-10-16 RX ADMIN — KETOROLAC TROMETHAMINE 60 MG: 30 INJECTION, SOLUTION INTRAMUSCULAR; INTRAVENOUS at 01:10

## 2020-10-16 RX ADMIN — CYANOCOBALAMIN 1000 MCG: 1000 INJECTION, SOLUTION INTRAMUSCULAR; SUBCUTANEOUS at 01:10

## 2020-10-16 NOTE — PROGRESS NOTES
Patient presented to clinic needing nurse injections. Complaining of level 5 out of 10 to back knees feet hands and elbows. Discussed symptoms with Dr Devi, labs reviewed, injections ordered.    Administered 1 cc ( 1000 mcg/ml ) of b12 to the right upper outer gluteal. Informed of s/s to report verbalized understanding. No adverse reactions noted.        Administered 2 cc ( 80 mg/ml ) of depomedrol to the right upper outer gluteal. Informed of s/s to report verbalized understanding. No adverse reactions noted.      Administered 2 cc ( 30 mg/ml ) of toradol to the left upper outer gluteal. Informed of s/s to report verbalized understanding. No adverse reactions noted.

## 2020-10-18 LAB — BACTERIA UR CULT: NO GROWTH

## 2020-10-19 ENCOUNTER — PATIENT MESSAGE (OUTPATIENT)
Dept: RHEUMATOLOGY | Facility: CLINIC | Age: 50
End: 2020-10-19

## 2020-10-20 NOTE — TELEPHONE ENCOUNTER
I don't know the answer to her question. Please review with Dr. Devi

## 2020-11-05 ENCOUNTER — PATIENT MESSAGE (OUTPATIENT)
Dept: RHEUMATOLOGY | Facility: CLINIC | Age: 50
End: 2020-11-05

## 2020-11-06 ENCOUNTER — TELEPHONE (OUTPATIENT)
Dept: RHEUMATOLOGY | Facility: CLINIC | Age: 50
End: 2020-11-06

## 2020-11-06 DIAGNOSIS — G43.019 INTRACTABLE MIGRAINE WITHOUT AURA AND WITHOUT STATUS MIGRAINOSUS: Primary | ICD-10-CM

## 2020-11-06 DIAGNOSIS — M06.00 SERONEGATIVE RHEUMATOID ARTHRITIS: ICD-10-CM

## 2020-11-06 DIAGNOSIS — J06.9 VIRAL UPPER RESPIRATORY TRACT INFECTION: ICD-10-CM

## 2020-11-06 DIAGNOSIS — L40.50 PSORIATIC ARTHRITIS: ICD-10-CM

## 2020-11-06 RX ORDER — ADALIMUMAB 40MG/0.4ML
40 KIT SUBCUTANEOUS
Qty: 2 PEN | Refills: 12 | Status: SHIPPED | OUTPATIENT
Start: 2020-11-06 | End: 2021-06-08 | Stop reason: SDUPTHER

## 2020-11-06 RX ORDER — BUTALBITAL, ACETAMINOPHEN AND CAFFEINE 50; 325; 40 MG/1; MG/1; MG/1
1 TABLET ORAL EVERY 4 HOURS PRN
Qty: 40 TABLET | Refills: 0 | Status: SHIPPED | OUTPATIENT
Start: 2020-11-06 | End: 2020-11-21

## 2020-11-06 RX ORDER — AZITHROMYCIN 250 MG/1
TABLET, FILM COATED ORAL
Qty: 10 TABLET | Refills: 0 | Status: SHIPPED | OUTPATIENT
Start: 2020-11-06 | End: 2020-11-16

## 2020-11-06 RX ORDER — DEXAMETHASONE 1 MG/1
1 TABLET ORAL DAILY
Qty: 10 TABLET | Refills: 0 | Status: SHIPPED | OUTPATIENT
Start: 2020-11-06 | End: 2020-11-16

## 2020-11-06 RX ORDER — RIZATRIPTAN BENZOATE 10 MG/1
10 TABLET ORAL
Qty: 12 TABLET | Refills: 0 | Status: SHIPPED | OUTPATIENT
Start: 2020-11-06 | End: 2020-12-11 | Stop reason: SDUPTHER

## 2020-11-06 NOTE — TELEPHONE ENCOUNTER
Pt sounds like she has a migraine if patient has any symptoms she will go and get tested for COVID otherwise I treat the migraine the until she sees Neurology

## 2020-11-13 ENCOUNTER — PATIENT MESSAGE (OUTPATIENT)
Dept: RHEUMATOLOGY | Facility: CLINIC | Age: 50
End: 2020-11-13

## 2020-11-13 DIAGNOSIS — M06.00 SERONEGATIVE RHEUMATOID ARTHRITIS: ICD-10-CM

## 2020-11-13 DIAGNOSIS — G89.4 CHRONIC PAIN SYNDROME: ICD-10-CM

## 2020-11-13 DIAGNOSIS — L40.50 PSORIATIC ARTHRITIS: Primary | ICD-10-CM

## 2020-11-16 ENCOUNTER — OFFICE VISIT (OUTPATIENT)
Dept: RHEUMATOLOGY | Facility: CLINIC | Age: 50
End: 2020-11-16
Payer: COMMERCIAL

## 2020-11-16 VITALS
SYSTOLIC BLOOD PRESSURE: 122 MMHG | WEIGHT: 134.94 LBS | DIASTOLIC BLOOD PRESSURE: 73 MMHG | BODY MASS INDEX: 26.49 KG/M2 | HEIGHT: 60 IN | HEART RATE: 59 BPM

## 2020-11-16 DIAGNOSIS — F32.A ANXIETY AND DEPRESSION: ICD-10-CM

## 2020-11-16 DIAGNOSIS — M06.00 SERONEGATIVE RHEUMATOID ARTHRITIS: Primary | ICD-10-CM

## 2020-11-16 DIAGNOSIS — D84.821 IMMUNOCOMPROMISED STATE DUE TO DRUG THERAPY: ICD-10-CM

## 2020-11-16 DIAGNOSIS — Z79.899 IMMUNOCOMPROMISED STATE DUE TO DRUG THERAPY: ICD-10-CM

## 2020-11-16 DIAGNOSIS — F41.9 ANXIETY AND DEPRESSION: ICD-10-CM

## 2020-11-16 DIAGNOSIS — L40.50 PSORIATIC ARTHRITIS: ICD-10-CM

## 2020-11-16 DIAGNOSIS — G43.809 OTHER MIGRAINE WITHOUT STATUS MIGRAINOSUS, NOT INTRACTABLE: ICD-10-CM

## 2020-11-16 DIAGNOSIS — G89.4 CHRONIC PAIN SYNDROME: ICD-10-CM

## 2020-11-16 PROCEDURE — 99214 OFFICE O/P EST MOD 30 MIN: CPT | Mod: 25,S$GLB,, | Performed by: PHYSICIAN ASSISTANT

## 2020-11-16 PROCEDURE — 99999 PR PBB SHADOW E&M-EST. PATIENT-LVL IV: ICD-10-PCS | Mod: PBBFAC,,, | Performed by: PHYSICIAN ASSISTANT

## 2020-11-16 PROCEDURE — 3008F BODY MASS INDEX DOCD: CPT | Mod: CPTII,S$GLB,, | Performed by: PHYSICIAN ASSISTANT

## 2020-11-16 PROCEDURE — 96372 THER/PROPH/DIAG INJ SC/IM: CPT | Mod: S$GLB,,, | Performed by: PHYSICIAN ASSISTANT

## 2020-11-16 PROCEDURE — 99999 PR PBB SHADOW E&M-EST. PATIENT-LVL IV: CPT | Mod: PBBFAC,,, | Performed by: PHYSICIAN ASSISTANT

## 2020-11-16 PROCEDURE — 1125F AMNT PAIN NOTED PAIN PRSNT: CPT | Mod: S$GLB,,, | Performed by: PHYSICIAN ASSISTANT

## 2020-11-16 PROCEDURE — 1125F PR PAIN SEVERITY QUANTIFIED, PAIN PRESENT: ICD-10-PCS | Mod: S$GLB,,, | Performed by: PHYSICIAN ASSISTANT

## 2020-11-16 PROCEDURE — 96372 PR INJECTION,THERAP/PROPH/DIAG2ST, IM OR SUBCUT: ICD-10-PCS | Mod: S$GLB,,, | Performed by: PHYSICIAN ASSISTANT

## 2020-11-16 PROCEDURE — 99214 PR OFFICE/OUTPT VISIT, EST, LEVL IV, 30-39 MIN: ICD-10-PCS | Mod: 25,S$GLB,, | Performed by: PHYSICIAN ASSISTANT

## 2020-11-16 PROCEDURE — 3008F PR BODY MASS INDEX (BMI) DOCUMENTED: ICD-10-PCS | Mod: CPTII,S$GLB,, | Performed by: PHYSICIAN ASSISTANT

## 2020-11-16 RX ORDER — KETOROLAC TROMETHAMINE 30 MG/ML
60 INJECTION, SOLUTION INTRAMUSCULAR; INTRAVENOUS
Status: COMPLETED | OUTPATIENT
Start: 2020-11-16 | End: 2020-11-16

## 2020-11-16 RX ORDER — VENLAFAXINE HYDROCHLORIDE 37.5 MG/1
CAPSULE, EXTENDED RELEASE ORAL
Qty: 60 CAPSULE | Refills: 3 | Status: SHIPPED | OUTPATIENT
Start: 2020-11-16 | End: 2021-03-05

## 2020-11-16 RX ORDER — DEXAMETHASONE SODIUM PHOSPHATE 4 MG/ML
4 INJECTION, SOLUTION INTRA-ARTICULAR; INTRALESIONAL; INTRAMUSCULAR; INTRAVENOUS; SOFT TISSUE
Status: COMPLETED | OUTPATIENT
Start: 2020-11-16 | End: 2020-11-16

## 2020-11-16 RX ORDER — CYANOCOBALAMIN 1000 UG/ML
1000 INJECTION, SOLUTION INTRAMUSCULAR; SUBCUTANEOUS
Status: COMPLETED | OUTPATIENT
Start: 2020-11-16 | End: 2020-11-16

## 2020-11-16 RX ADMIN — DEXAMETHASONE SODIUM PHOSPHATE 4 MG: 4 INJECTION, SOLUTION INTRA-ARTICULAR; INTRALESIONAL; INTRAMUSCULAR; INTRAVENOUS; SOFT TISSUE at 11:11

## 2020-11-16 RX ADMIN — KETOROLAC TROMETHAMINE 60 MG: 30 INJECTION, SOLUTION INTRAMUSCULAR; INTRAVENOUS at 11:11

## 2020-11-16 RX ADMIN — CYANOCOBALAMIN 1000 MCG: 1000 INJECTION, SOLUTION INTRAMUSCULAR; SUBCUTANEOUS at 11:11

## 2020-11-16 ASSESSMENT — ROUTINE ASSESSMENT OF PATIENT INDEX DATA (RAPID3)
TOTAL RAPID3 SCORE: 5.5
FATIGUE SCORE: 1.1
AM STIFFNESS SCORE: 1, YES
PSYCHOLOGICAL DISTRESS SCORE: 4.4
PATIENT GLOBAL ASSESSMENT SCORE: 6.5
PAIN SCORE: 7
MDHAQ FUNCTION SCORE: 0.9
WHEN YOU AWAKENED IN THE MORNING OVER THE LAST WEEK, PLEASE INDICATE THE AMOUNT OF TIME IT TAKES UNTIL YOU ARE AS LIMBER AS YOU WILL BE FOR THE DAY: CONSTANT PAIN

## 2020-11-16 NOTE — PATIENT INSTRUCTIONS
Start effexor 37.5 mg daily for 1 week then increase to 2 tabs daily thereafter    Decrease lexapro 5 mg for 1 week then off

## 2020-11-16 NOTE — PROGRESS NOTES
Subjective:       Patient ID: Osiris Billingsley is a 50 y.o. female.    Chief Complaint: Disease Management, Pain (hip, hands, elobows and knees; right foor), and Swelling (swelling in right hand)    Mrs. Billingsley is a 50 year old female who presents to clinic for follow up on rheumatoid arthritis. She has been doing poorly since her last visit. She has been suffering with persistent headache for several weeks--Dr. Devi prescribed imitrex and fioricet, but she plans to f/u with Neurology. She has increased stress at home and her lexapro does not seem to be provided any significant improvement for her mood. She is taking klonopin more frequently due to uncontrolled anxiety. No SI. She has noticed increased swelling in her hands since holding Humira.    Current tx:  1. Humira  2. Norco    Review of Systems   Constitutional: Positive for activity change. Negative for appetite change, chills, fatigue and fever.   Eyes: Negative for visual disturbance.   Respiratory: Negative for cough.    Cardiovascular: Negative for chest pain.   Gastrointestinal: Negative for abdominal pain and constipation.   Musculoskeletal: Positive for arthralgias and joint swelling.   Skin: Negative for rash.   Allergic/Immunologic: Positive for immunocompromised state.   Neurological: Positive for headaches. Negative for dizziness, weakness and light-headedness.   Psychiatric/Behavioral: Positive for dysphoric mood. The patient is nervous/anxious.          Objective:     Vitals:    20 1012   BP: 122/73   Pulse: (!) 59       Past Medical History:   Diagnosis Date    Colitis     Dry eyes     Dry mouth     Neuromuscular disorder     Premature menopause     Rheumatoid arthritis      Past Surgical History:   Procedure Laterality Date     SECTION            Physical Exam   Constitutional: She is well-developed, well-nourished, and in no distress.   Eyes: Right conjunctiva is not injected. Left conjunctiva is not injected. Right  eye exhibits normal extraocular motion. Left eye exhibits normal extraocular motion.   Neck: No JVD present. No thyromegaly present.   Cardiovascular: Normal rate and regular rhythm.  Exam reveals no decreased pulses.    Pulmonary/Chest: Effort normal.       Right Side Rheumatological Exam     Examination finds the shoulder, elbow, knee, 2nd PIP, 3rd PIP, 4th PIP and 5th PIP normal.    The patient is tender to palpation of the wrist, 1st MCP, 2nd MCP, 3rd MCP, 4th MCP and 5th MCP    Left Side Rheumatological Exam     Examination finds the shoulder, elbow, knee, 1st PIP, 1st MCP, 2nd PIP, 2nd MCP, 3rd PIP, 3rd MCP, 4th PIP, 4th MCP, 5th PIP and 5th MCP normal.    The patient is tender to palpation of the wrist.      Lymphadenopathy:     She has no cervical adenopathy.   Neurological: Gait normal.   Skin: No rash noted.     Psychiatric: Affect normal. Her mood appears anxious.   Pt is tearful during our visit.   Musculoskeletal: Tenderness and edema present.      Recent labs reviewed:  Component      Latest Ref Rng & Units 7/20/2020   WBC      3.90 - 12.70 K/uL 4.30   RBC      4.00 - 5.40 M/uL 4.41   Hemoglobin      12.0 - 16.0 g/dL 13.2   Hematocrit      37.0 - 48.5 % 42.9   MCV      82 - 98 fL 97   MCH      27.0 - 31.0 pg 29.9   MCHC      32.0 - 36.0 g/dL 30.8 (L)   RDW      11.5 - 14.5 % 12.8   Platelets      150 - 350 K/uL 274   MPV      9.2 - 12.9 fL 11.1   Immature Granulocytes      0.0 - 0.5 % 0.0   Gran # (ANC)      1.8 - 7.7 K/uL 2.0   Immature Grans (Abs)      0.00 - 0.04 K/uL 0.00   Lymph #      1.0 - 4.8 K/uL 1.8   Mono #      0.3 - 1.0 K/uL 0.3   Eos #      0.0 - 0.5 K/uL 0.1   Baso #      0.00 - 0.20 K/uL 0.03   nRBC      0 /100 WBC 0   Gran %      38.0 - 73.0 % 47.0   Lymph %      18.0 - 48.0 % 42.8   Mono %      4.0 - 15.0 % 6.7   Eosinophil %      0.0 - 8.0 % 2.8   Basophil %      0.0 - 1.9 % 0.7   Differential Method       Automated   Sodium      136 - 145 mmol/L 139   Potassium      3.5 - 5.1 mmol/L  4.2   Chloride      95 - 110 mmol/L 102   CO2      23 - 29 mmol/L 30 (H)   Glucose      70 - 110 mg/dL 96   BUN      6 - 20 mg/dL 19   Creatinine      0.5 - 1.4 mg/dL 0.8   Calcium      8.7 - 10.5 mg/dL 9.7   PROTEIN TOTAL      6.0 - 8.4 g/dL 7.8   Albumin      3.5 - 5.2 g/dL 4.5   BILIRUBIN TOTAL      0.1 - 1.0 mg/dL 0.5   Alkaline Phosphatase      55 - 135 U/L 55   AST      10 - 40 U/L 18   ALT      10 - 44 U/L 15   Anion Gap      8 - 16 mmol/L 7 (L)   eGFR if African American      >60 mL/min/1.73 m:2 >60.0   eGFR if non African American      >60 mL/min/1.73 m:2 >60.0   NIL      See text IU/mL 0.020   TB1 - Nil      See text IU/mL 0.010   TB2 - Nil      See text IU/mL 0.000   Mitogen - Nil      See text IU/mL 6.830   TB Gold Plus       Negative   Hepatitis C Ab      Negative Negative   Hep B Core Total Ab       Negative   Hepatitis B Surface Ag      Negative Negative   CRP      0.0 - 8.2 mg/L 0.5   Sed Rate      0 - 20 mm/Hr 14     Assessment:       1. Seronegative rheumatoid arthritis    2. Psoriatic arthritis    3. Immunocompromised state due to drug therapy    4. Chronic pain syndrome    5. Other migraine without status migrainosus, not intractable    6. Anxiety and depression            Plan:       Seronegative rheumatoid arthritis  -     ketorolac injection 60 mg  -     dexamethasone injection 4 mg  -     cyanocobalamin injection 1,000 mcg  -     CBC Auto Differential; Future; Expected date: 11/16/2020  -     Comprehensive Metabolic Panel; Future; Expected date: 11/16/2020  -     C-Reactive Protein; Future; Expected date: 11/16/2020  -     Sedimentation rate; Future; Expected date: 11/16/2020    Psoriatic arthritis  -     ketorolac injection 60 mg  -     dexamethasone injection 4 mg  -     cyanocobalamin injection 1,000 mcg  -     CBC Auto Differential; Future; Expected date: 11/16/2020  -     Comprehensive Metabolic Panel; Future; Expected date: 11/16/2020  -     C-Reactive Protein; Future; Expected date:  11/16/2020  -     Sedimentation rate; Future; Expected date: 11/16/2020    Immunocompromised state due to drug therapy    Chronic pain syndrome    Other migraine without status migrainosus, not intractable  -     venlafaxine (EFFEXOR-XR) 37.5 MG 24 hr capsule; One tab PO daily for 1 week then increase 2 tabs PO daily thereafter  Dispense: 60 capsule; Refill: 3    Anxiety and depression  -     venlafaxine (EFFEXOR-XR) 37.5 MG 24 hr capsule; One tab PO daily for 1 week then increase 2 tabs PO daily thereafter  Dispense: 60 capsule; Refill: 3        Assessment:  50 year old female with  Seronegative rheumatoid arthritis, psoriatic arthritis, psoriasis, ulcerative colitis, and uveitis  --history of ARLIN 1:160 homogenous    Plan:  1. Toradol 60, dexa 4 mg, b12 given today for acute flare  2. Restart Humira every 14 days  3. Decrease lexapro 5 mg daily x 1 week then d/c. Start effexor 37.5 mg daily x 1 week then increase to 2 tabs daily. F/u mychart in 2 weeks may need further dose increase. Hopefully effexor will help with mood as well as headache prevention. Neurology f/u still encouranged  4. Continue norco, klonopin PRN per Dr. Devi.  I have checked [place] prescription monitoring program site and no unusual or abnormal behavior has occurred pt understand the risk and benefits of taking opioid medications and has decided to continue the medication.      Follow up:   3-4 mo Dr. Devi w/routine labs prior

## 2020-11-17 RX ORDER — HYDROCODONE BITARTRATE AND ACETAMINOPHEN 7.5; 325 MG/1; MG/1
1 TABLET ORAL EVERY 8 HOURS PRN
Qty: 90 TABLET | Refills: 0 | Status: SHIPPED | OUTPATIENT
Start: 2020-11-17 | End: 2020-12-11 | Stop reason: SDUPTHER

## 2020-11-18 ENCOUNTER — PATIENT MESSAGE (OUTPATIENT)
Dept: RHEUMATOLOGY | Facility: CLINIC | Age: 50
End: 2020-11-18

## 2020-11-23 DIAGNOSIS — M47.817 LUMBAR AND SACRAL OSTEOARTHRITIS: ICD-10-CM

## 2020-11-23 DIAGNOSIS — M79.7 FIBROMYALGIA: ICD-10-CM

## 2020-11-23 DIAGNOSIS — B02.9 HERPES ZOSTER WITHOUT COMPLICATION: Primary | ICD-10-CM

## 2020-11-23 DIAGNOSIS — K51.90 ULCERATIVE COLITIS WITHOUT COMPLICATIONS: ICD-10-CM

## 2020-11-23 RX ORDER — VALACYCLOVIR HYDROCHLORIDE 1 G/1
TABLET, FILM COATED ORAL
Qty: 60 TABLET | Refills: 1 | Status: SHIPPED | OUTPATIENT
Start: 2020-11-23 | End: 2022-10-24 | Stop reason: SDUPTHER

## 2020-12-08 ENCOUNTER — PATIENT MESSAGE (OUTPATIENT)
Dept: RHEUMATOLOGY | Facility: CLINIC | Age: 50
End: 2020-12-08

## 2020-12-08 ENCOUNTER — TELEPHONE (OUTPATIENT)
Dept: RHEUMATOLOGY | Facility: CLINIC | Age: 50
End: 2020-12-08

## 2020-12-09 NOTE — TELEPHONE ENCOUNTER
----- Message from Iris Castañeda PA-C sent at 11/16/2020 10:35 AM CST -----  Mychart update in 2 weeks on mood and headache

## 2020-12-11 ENCOUNTER — PATIENT MESSAGE (OUTPATIENT)
Dept: RHEUMATOLOGY | Facility: CLINIC | Age: 50
End: 2020-12-11

## 2021-01-07 DIAGNOSIS — M47.817 LUMBAR AND SACRAL OSTEOARTHRITIS: ICD-10-CM

## 2021-01-07 DIAGNOSIS — K51.919 ULCERATIVE COLITIS WITH COMPLICATION, UNSPECIFIED LOCATION: ICD-10-CM

## 2021-01-07 DIAGNOSIS — L40.50 PSORIATIC ARTHRITIS: ICD-10-CM

## 2021-01-07 DIAGNOSIS — K21.00 GERD WITH ESOPHAGITIS: ICD-10-CM

## 2021-01-07 DIAGNOSIS — M06.00 SERONEGATIVE RHEUMATOID ARTHRITIS: ICD-10-CM

## 2021-01-08 RX ORDER — CLONAZEPAM 0.5 MG/1
0.5 TABLET ORAL 2 TIMES DAILY PRN
Qty: 60 TABLET | Refills: 1 | Status: SHIPPED | OUTPATIENT
Start: 2021-01-08 | End: 2021-04-06 | Stop reason: SDUPTHER

## 2021-02-22 ENCOUNTER — PATIENT MESSAGE (OUTPATIENT)
Dept: RHEUMATOLOGY | Facility: CLINIC | Age: 51
End: 2021-02-22

## 2021-02-22 DIAGNOSIS — L40.50 PSORIATIC ARTHRITIS: ICD-10-CM

## 2021-02-22 DIAGNOSIS — M06.00 SERONEGATIVE RHEUMATOID ARTHRITIS: ICD-10-CM

## 2021-02-22 DIAGNOSIS — M25.559 HIP PAIN: Primary | ICD-10-CM

## 2021-02-22 DIAGNOSIS — G89.4 CHRONIC PAIN SYNDROME: ICD-10-CM

## 2021-02-23 ENCOUNTER — CLINICAL SUPPORT (OUTPATIENT)
Dept: RHEUMATOLOGY | Facility: CLINIC | Age: 51
End: 2021-02-23
Payer: COMMERCIAL

## 2021-02-23 VITALS
WEIGHT: 141.44 LBS | DIASTOLIC BLOOD PRESSURE: 78 MMHG | SYSTOLIC BLOOD PRESSURE: 122 MMHG | HEIGHT: 60 IN | BODY MASS INDEX: 27.77 KG/M2

## 2021-02-23 DIAGNOSIS — M47.817 LUMBAR AND SACRAL OSTEOARTHRITIS: ICD-10-CM

## 2021-02-23 DIAGNOSIS — L40.50 PSORIATIC ARTHRITIS: ICD-10-CM

## 2021-02-23 DIAGNOSIS — M06.00 SERONEGATIVE RHEUMATOID ARTHRITIS: Primary | ICD-10-CM

## 2021-02-23 PROCEDURE — 99999 PR PBB SHADOW E&M-EST. PATIENT-LVL III: CPT | Mod: PBBFAC,,,

## 2021-02-23 PROCEDURE — 96372 THER/PROPH/DIAG INJ SC/IM: CPT | Mod: S$GLB,,, | Performed by: INTERNAL MEDICINE

## 2021-02-23 PROCEDURE — 96372 PR INJECTION,THERAP/PROPH/DIAG2ST, IM OR SUBCUT: ICD-10-PCS | Mod: S$GLB,,, | Performed by: INTERNAL MEDICINE

## 2021-02-23 PROCEDURE — 99999 PR PBB SHADOW E&M-EST. PATIENT-LVL III: ICD-10-PCS | Mod: PBBFAC,,,

## 2021-02-23 RX ORDER — KETOROLAC TROMETHAMINE 30 MG/ML
60 INJECTION, SOLUTION INTRAMUSCULAR; INTRAVENOUS
Status: COMPLETED | OUTPATIENT
Start: 2021-02-23 | End: 2021-02-23

## 2021-02-23 RX ORDER — METHYLPREDNISOLONE ACETATE 80 MG/ML
80 INJECTION, SUSPENSION INTRA-ARTICULAR; INTRALESIONAL; INTRAMUSCULAR; SOFT TISSUE
Status: COMPLETED | OUTPATIENT
Start: 2021-02-23 | End: 2021-02-23

## 2021-02-23 RX ADMIN — METHYLPREDNISOLONE ACETATE 80 MG: 80 INJECTION, SUSPENSION INTRA-ARTICULAR; INTRALESIONAL; INTRAMUSCULAR; SOFT TISSUE at 12:02

## 2021-02-23 RX ADMIN — KETOROLAC TROMETHAMINE 60 MG: 30 INJECTION, SOLUTION INTRAMUSCULAR; INTRAVENOUS at 12:02

## 2021-02-24 ENCOUNTER — PATIENT MESSAGE (OUTPATIENT)
Dept: RHEUMATOLOGY | Facility: CLINIC | Age: 51
End: 2021-02-24

## 2021-02-25 DIAGNOSIS — G89.4 CHRONIC PAIN SYNDROME: ICD-10-CM

## 2021-02-25 DIAGNOSIS — L40.50 PSORIATIC ARTHRITIS: ICD-10-CM

## 2021-02-25 DIAGNOSIS — M06.00 SERONEGATIVE RHEUMATOID ARTHRITIS: ICD-10-CM

## 2021-02-26 RX ORDER — HYDROCODONE BITARTRATE AND ACETAMINOPHEN 7.5; 325 MG/1; MG/1
1 TABLET ORAL EVERY 8 HOURS PRN
Qty: 90 TABLET | Refills: 0 | Status: SHIPPED | OUTPATIENT
Start: 2021-02-26 | End: 2021-04-29 | Stop reason: SDUPTHER

## 2021-02-26 RX ORDER — HYDROCODONE BITARTRATE AND ACETAMINOPHEN 7.5; 325 MG/1; MG/1
1 TABLET ORAL EVERY 8 HOURS PRN
Qty: 90 TABLET | Refills: 0 | OUTPATIENT
Start: 2021-02-26

## 2021-03-01 DIAGNOSIS — L40.50 PSORIATIC ARTHRITIS: ICD-10-CM

## 2021-03-01 DIAGNOSIS — M06.00 SERONEGATIVE RHEUMATOID ARTHRITIS: ICD-10-CM

## 2021-03-01 DIAGNOSIS — G89.4 CHRONIC PAIN SYNDROME: ICD-10-CM

## 2021-03-05 ENCOUNTER — PATIENT MESSAGE (OUTPATIENT)
Dept: RHEUMATOLOGY | Facility: CLINIC | Age: 51
End: 2021-03-05

## 2021-03-05 DIAGNOSIS — F41.9 ANXIETY AND DEPRESSION: Primary | ICD-10-CM

## 2021-03-05 DIAGNOSIS — F32.A ANXIETY AND DEPRESSION: Primary | ICD-10-CM

## 2021-03-05 RX ORDER — VENLAFAXINE HYDROCHLORIDE 75 MG/1
75 CAPSULE, EXTENDED RELEASE ORAL DAILY
Qty: 30 CAPSULE | Refills: 6 | Status: SHIPPED | OUTPATIENT
Start: 2021-03-05 | End: 2021-06-08

## 2021-03-08 ENCOUNTER — LAB VISIT (OUTPATIENT)
Dept: LAB | Facility: HOSPITAL | Age: 51
End: 2021-03-08
Attending: PHYSICIAN ASSISTANT
Payer: COMMERCIAL

## 2021-03-08 DIAGNOSIS — M06.00 SERONEGATIVE RHEUMATOID ARTHRITIS: ICD-10-CM

## 2021-03-08 DIAGNOSIS — L40.50 PSORIATIC ARTHRITIS: ICD-10-CM

## 2021-03-08 PROCEDURE — 86140 C-REACTIVE PROTEIN: CPT | Performed by: PHYSICIAN ASSISTANT

## 2021-03-08 PROCEDURE — 80053 COMPREHEN METABOLIC PANEL: CPT | Performed by: PHYSICIAN ASSISTANT

## 2021-03-08 PROCEDURE — 85652 RBC SED RATE AUTOMATED: CPT | Performed by: PHYSICIAN ASSISTANT

## 2021-03-08 PROCEDURE — 36415 COLL VENOUS BLD VENIPUNCTURE: CPT | Mod: PO | Performed by: PHYSICIAN ASSISTANT

## 2021-03-08 PROCEDURE — 85025 COMPLETE CBC W/AUTO DIFF WBC: CPT | Performed by: PHYSICIAN ASSISTANT

## 2021-03-09 LAB
ALBUMIN SERPL BCP-MCNC: 4.1 G/DL (ref 3.5–5.2)
ALP SERPL-CCNC: 57 U/L (ref 55–135)
ALT SERPL W/O P-5'-P-CCNC: 15 U/L (ref 10–44)
ANION GAP SERPL CALC-SCNC: 8 MMOL/L (ref 8–16)
AST SERPL-CCNC: 16 U/L (ref 10–40)
BASOPHILS # BLD AUTO: 0.03 K/UL (ref 0–0.2)
BASOPHILS NFR BLD: 0.5 % (ref 0–1.9)
BILIRUB SERPL-MCNC: 0.5 MG/DL (ref 0.1–1)
BUN SERPL-MCNC: 11 MG/DL (ref 6–20)
CALCIUM SERPL-MCNC: 9.4 MG/DL (ref 8.7–10.5)
CHLORIDE SERPL-SCNC: 101 MMOL/L (ref 95–110)
CO2 SERPL-SCNC: 31 MMOL/L (ref 23–29)
CREAT SERPL-MCNC: 0.9 MG/DL (ref 0.5–1.4)
CRP SERPL-MCNC: 0.8 MG/L (ref 0–8.2)
DIFFERENTIAL METHOD: ABNORMAL
EOSINOPHIL # BLD AUTO: 0 K/UL (ref 0–0.5)
EOSINOPHIL NFR BLD: 0.5 % (ref 0–8)
ERYTHROCYTE [DISTWIDTH] IN BLOOD BY AUTOMATED COUNT: 12.5 % (ref 11.5–14.5)
ERYTHROCYTE [SEDIMENTATION RATE] IN BLOOD BY WESTERGREN METHOD: 13 MM/HR (ref 0–36)
EST. GFR  (AFRICAN AMERICAN): >60 ML/MIN/1.73 M^2
EST. GFR  (NON AFRICAN AMERICAN): >60 ML/MIN/1.73 M^2
GLUCOSE SERPL-MCNC: 85 MG/DL (ref 70–110)
HCT VFR BLD AUTO: 38.5 % (ref 37–48.5)
HGB BLD-MCNC: 12.2 G/DL (ref 12–16)
IMM GRANULOCYTES # BLD AUTO: 0.01 K/UL (ref 0–0.04)
IMM GRANULOCYTES NFR BLD AUTO: 0.2 % (ref 0–0.5)
LYMPHOCYTES # BLD AUTO: 2.1 K/UL (ref 1–4.8)
LYMPHOCYTES NFR BLD: 36 % (ref 18–48)
MCH RBC QN AUTO: 29.8 PG (ref 27–31)
MCHC RBC AUTO-ENTMCNC: 31.7 G/DL (ref 32–36)
MCV RBC AUTO: 94 FL (ref 82–98)
MONOCYTES # BLD AUTO: 0.3 K/UL (ref 0.3–1)
MONOCYTES NFR BLD: 5.4 % (ref 4–15)
NEUTROPHILS # BLD AUTO: 3.3 K/UL (ref 1.8–7.7)
NEUTROPHILS NFR BLD: 57.4 % (ref 38–73)
NRBC BLD-RTO: 0 /100 WBC
PLATELET # BLD AUTO: 262 K/UL (ref 150–350)
PMV BLD AUTO: 11.2 FL (ref 9.2–12.9)
POTASSIUM SERPL-SCNC: 4.2 MMOL/L (ref 3.5–5.1)
PROT SERPL-MCNC: 7.4 G/DL (ref 6–8.4)
RBC # BLD AUTO: 4.09 M/UL (ref 4–5.4)
SODIUM SERPL-SCNC: 140 MMOL/L (ref 136–145)
WBC # BLD AUTO: 5.72 K/UL (ref 3.9–12.7)

## 2021-03-16 ENCOUNTER — PATIENT MESSAGE (OUTPATIENT)
Dept: RHEUMATOLOGY | Facility: CLINIC | Age: 51
End: 2021-03-16

## 2021-03-19 ENCOUNTER — TELEPHONE (OUTPATIENT)
Dept: RHEUMATOLOGY | Facility: CLINIC | Age: 51
End: 2021-03-19

## 2021-03-19 ENCOUNTER — PATIENT MESSAGE (OUTPATIENT)
Dept: RHEUMATOLOGY | Facility: CLINIC | Age: 51
End: 2021-03-19

## 2021-04-06 ENCOUNTER — PATIENT MESSAGE (OUTPATIENT)
Dept: RHEUMATOLOGY | Facility: CLINIC | Age: 51
End: 2021-04-06

## 2021-04-06 DIAGNOSIS — K21.00 GERD WITH ESOPHAGITIS: ICD-10-CM

## 2021-04-06 DIAGNOSIS — M47.817 LUMBAR AND SACRAL OSTEOARTHRITIS: ICD-10-CM

## 2021-04-06 DIAGNOSIS — M06.00 SERONEGATIVE RHEUMATOID ARTHRITIS: ICD-10-CM

## 2021-04-06 DIAGNOSIS — K51.919 ULCERATIVE COLITIS WITH COMPLICATION, UNSPECIFIED LOCATION: ICD-10-CM

## 2021-04-06 DIAGNOSIS — L40.50 PSORIATIC ARTHRITIS: ICD-10-CM

## 2021-04-09 RX ORDER — CLONAZEPAM 0.5 MG/1
0.5 TABLET ORAL 2 TIMES DAILY PRN
Qty: 60 TABLET | Refills: 1 | Status: SHIPPED | OUTPATIENT
Start: 2021-04-09 | End: 2021-06-18 | Stop reason: SDUPTHER

## 2021-04-22 ENCOUNTER — PATIENT MESSAGE (OUTPATIENT)
Dept: RHEUMATOLOGY | Facility: CLINIC | Age: 51
End: 2021-04-22

## 2021-04-22 DIAGNOSIS — M25.559 HIP PAIN: Primary | ICD-10-CM

## 2021-04-23 ENCOUNTER — PATIENT MESSAGE (OUTPATIENT)
Dept: RHEUMATOLOGY | Facility: CLINIC | Age: 51
End: 2021-04-23

## 2021-04-29 ENCOUNTER — HOSPITAL ENCOUNTER (OUTPATIENT)
Dept: RADIOLOGY | Facility: HOSPITAL | Age: 51
Discharge: HOME | End: 2021-04-29
Attending: PHYSICIAN ASSISTANT
Payer: COMMERCIAL

## 2021-04-29 ENCOUNTER — OFFICE VISIT (OUTPATIENT)
Dept: RHEUMATOLOGY | Facility: CLINIC | Age: 51
End: 2021-04-29
Payer: COMMERCIAL

## 2021-04-29 VITALS
WEIGHT: 148.06 LBS | DIASTOLIC BLOOD PRESSURE: 68 MMHG | SYSTOLIC BLOOD PRESSURE: 114 MMHG | HEART RATE: 66 BPM | HEIGHT: 60 IN | BODY MASS INDEX: 29.07 KG/M2

## 2021-04-29 DIAGNOSIS — M25.552 LEFT HIP PAIN: ICD-10-CM

## 2021-04-29 DIAGNOSIS — G89.4 CHRONIC PAIN SYNDROME: ICD-10-CM

## 2021-04-29 DIAGNOSIS — Z79.899 IMMUNOCOMPROMISED STATE DUE TO DRUG THERAPY: ICD-10-CM

## 2021-04-29 DIAGNOSIS — M25.559 HIP PAIN: ICD-10-CM

## 2021-04-29 DIAGNOSIS — F41.9 ANXIETY AND DEPRESSION: ICD-10-CM

## 2021-04-29 DIAGNOSIS — F32.A ANXIETY AND DEPRESSION: ICD-10-CM

## 2021-04-29 DIAGNOSIS — M06.00 SERONEGATIVE RHEUMATOID ARTHRITIS: ICD-10-CM

## 2021-04-29 DIAGNOSIS — M06.00 SERONEGATIVE RHEUMATOID ARTHRITIS: Primary | ICD-10-CM

## 2021-04-29 DIAGNOSIS — L40.50 PSORIATIC ARTHRITIS: ICD-10-CM

## 2021-04-29 DIAGNOSIS — D84.821 IMMUNOCOMPROMISED STATE DUE TO DRUG THERAPY: ICD-10-CM

## 2021-04-29 PROCEDURE — 99999 PR PBB SHADOW E&M-EST. PATIENT-LVL V: CPT | Mod: PBBFAC,,, | Performed by: PHYSICIAN ASSISTANT

## 2021-04-29 PROCEDURE — 99999 PR PBB SHADOW E&M-EST. PATIENT-LVL V: ICD-10-PCS | Mod: PBBFAC,,, | Performed by: PHYSICIAN ASSISTANT

## 2021-04-29 PROCEDURE — 99214 PR OFFICE/OUTPT VISIT, EST, LEVL IV, 30-39 MIN: ICD-10-PCS | Mod: 25,S$GLB,, | Performed by: PHYSICIAN ASSISTANT

## 2021-04-29 PROCEDURE — 3008F PR BODY MASS INDEX (BMI) DOCUMENTED: ICD-10-PCS | Mod: CPTII,S$GLB,, | Performed by: PHYSICIAN ASSISTANT

## 2021-04-29 PROCEDURE — 99214 OFFICE O/P EST MOD 30 MIN: CPT | Mod: 25,S$GLB,, | Performed by: PHYSICIAN ASSISTANT

## 2021-04-29 PROCEDURE — 1125F PR PAIN SEVERITY QUANTIFIED, PAIN PRESENT: ICD-10-PCS | Mod: S$GLB,,, | Performed by: PHYSICIAN ASSISTANT

## 2021-04-29 PROCEDURE — 96372 PR INJECTION,THERAP/PROPH/DIAG2ST, IM OR SUBCUT: ICD-10-PCS | Mod: S$GLB,,, | Performed by: PHYSICIAN ASSISTANT

## 2021-04-29 PROCEDURE — 73502 X-RAY EXAM HIP UNI 2-3 VIEWS: CPT | Mod: 26,LT,, | Performed by: RADIOLOGY

## 2021-04-29 PROCEDURE — 96372 THER/PROPH/DIAG INJ SC/IM: CPT | Mod: S$GLB,,, | Performed by: PHYSICIAN ASSISTANT

## 2021-04-29 PROCEDURE — 1125F AMNT PAIN NOTED PAIN PRSNT: CPT | Mod: S$GLB,,, | Performed by: PHYSICIAN ASSISTANT

## 2021-04-29 PROCEDURE — 73502 X-RAY EXAM HIP UNI 2-3 VIEWS: CPT | Mod: TC,FY,PO,LT

## 2021-04-29 PROCEDURE — 3008F BODY MASS INDEX DOCD: CPT | Mod: CPTII,S$GLB,, | Performed by: PHYSICIAN ASSISTANT

## 2021-04-29 PROCEDURE — 73502 XR HIP 2 VIEW LEFT: ICD-10-PCS | Mod: 26,LT,, | Performed by: RADIOLOGY

## 2021-04-29 RX ORDER — PROPRANOLOL HYDROCHLORIDE 60 MG/1
60 CAPSULE, EXTENDED RELEASE ORAL DAILY
COMMUNITY
Start: 2021-04-02 | End: 2022-05-17

## 2021-04-29 RX ORDER — KETOROLAC TROMETHAMINE 30 MG/ML
60 INJECTION, SOLUTION INTRAMUSCULAR; INTRAVENOUS
Status: COMPLETED | OUTPATIENT
Start: 2021-04-29 | End: 2021-04-29

## 2021-04-29 RX ORDER — IBUPROFEN AND FAMOTIDINE 800; 26.6 MG/1; MG/1
1 TABLET, COATED ORAL 3 TIMES DAILY
Qty: 90 TABLET | Refills: 5 | Status: SHIPPED | OUTPATIENT
Start: 2021-04-29 | End: 2021-10-31 | Stop reason: SDUPTHER

## 2021-04-29 RX ORDER — CYANOCOBALAMIN 1000 UG/ML
1000 INJECTION, SOLUTION INTRAMUSCULAR; SUBCUTANEOUS
Status: COMPLETED | OUTPATIENT
Start: 2021-04-29 | End: 2021-04-29

## 2021-04-29 RX ORDER — FREMANEZUMAB-VFRM 225 MG/1.5ML
225 INJECTION SUBCUTANEOUS
COMMUNITY
End: 2021-09-17

## 2021-04-29 RX ORDER — METHYLPREDNISOLONE ACETATE 80 MG/ML
160 INJECTION, SUSPENSION INTRA-ARTICULAR; INTRALESIONAL; INTRAMUSCULAR; SOFT TISSUE
Status: COMPLETED | OUTPATIENT
Start: 2021-04-29 | End: 2021-04-29

## 2021-04-29 RX ORDER — VORTIOXETINE 10 MG/1
10 TABLET, FILM COATED ORAL DAILY
Qty: 30 TABLET | Refills: 3 | Status: SHIPPED | OUTPATIENT
Start: 2021-04-29 | End: 2021-06-08

## 2021-04-29 RX ORDER — TIZANIDINE 4 MG/1
4 TABLET ORAL EVERY 8 HOURS PRN
Qty: 30 TABLET | Refills: 0 | Status: SHIPPED | OUTPATIENT
Start: 2021-04-29 | End: 2021-05-09

## 2021-04-29 RX ADMIN — METHYLPREDNISOLONE ACETATE 160 MG: 80 INJECTION, SUSPENSION INTRA-ARTICULAR; INTRALESIONAL; INTRAMUSCULAR; SOFT TISSUE at 10:04

## 2021-04-29 RX ADMIN — KETOROLAC TROMETHAMINE 60 MG: 30 INJECTION, SOLUTION INTRAMUSCULAR; INTRAVENOUS at 10:04

## 2021-04-29 RX ADMIN — CYANOCOBALAMIN 1000 MCG: 1000 INJECTION, SOLUTION INTRAMUSCULAR; SUBCUTANEOUS at 10:04

## 2021-04-29 ASSESSMENT — ROUTINE ASSESSMENT OF PATIENT INDEX DATA (RAPID3)
FATIGUE SCORE: 1.1
TOTAL RAPID3 SCORE: 6.05
PAIN SCORE: 8.5
MDHAQ FUNCTION SCORE: 1.1
PATIENT GLOBAL ASSESSMENT SCORE: 6
PSYCHOLOGICAL DISTRESS SCORE: 4.4

## 2021-04-30 RX ORDER — HYDROCODONE BITARTRATE AND ACETAMINOPHEN 7.5; 325 MG/1; MG/1
1 TABLET ORAL EVERY 8 HOURS PRN
Qty: 90 TABLET | Refills: 0 | Status: SHIPPED | OUTPATIENT
Start: 2021-04-30 | End: 2021-06-18 | Stop reason: SDUPTHER

## 2021-05-10 ENCOUNTER — PATIENT MESSAGE (OUTPATIENT)
Dept: RHEUMATOLOGY | Facility: CLINIC | Age: 51
End: 2021-05-10

## 2021-06-03 DIAGNOSIS — G47.00 INSOMNIA, UNSPECIFIED TYPE: ICD-10-CM

## 2021-06-03 RX ORDER — TRAZODONE HYDROCHLORIDE 100 MG/1
TABLET ORAL
Qty: 90 TABLET | Refills: 3 | Status: SHIPPED | OUTPATIENT
Start: 2021-06-03 | End: 2022-08-29 | Stop reason: SDUPTHER

## 2021-06-08 ENCOUNTER — PATIENT MESSAGE (OUTPATIENT)
Dept: RHEUMATOLOGY | Facility: CLINIC | Age: 51
End: 2021-06-08

## 2021-06-08 DIAGNOSIS — F32.9 MAJOR DEPRESSION, CHRONIC: Primary | ICD-10-CM

## 2021-06-08 DIAGNOSIS — L40.50 PSORIATIC ARTHRITIS: ICD-10-CM

## 2021-06-08 DIAGNOSIS — M06.00 SERONEGATIVE RHEUMATOID ARTHRITIS: ICD-10-CM

## 2021-06-08 RX ORDER — ADALIMUMAB 40MG/0.4ML
40 KIT SUBCUTANEOUS
Qty: 2 PEN | Refills: 6 | Status: SHIPPED | OUTPATIENT
Start: 2021-06-08 | End: 2022-01-18

## 2021-06-08 RX ORDER — ESCITALOPRAM OXALATE 20 MG/1
20 TABLET ORAL DAILY
Qty: 30 TABLET | Refills: 6 | Status: SHIPPED | OUTPATIENT
Start: 2021-06-08 | End: 2022-01-18 | Stop reason: SDUPTHER

## 2021-06-08 RX ORDER — ESCITALOPRAM OXALATE 10 MG/1
10 TABLET ORAL DAILY
Qty: 7 TABLET | Refills: 0 | Status: SHIPPED | OUTPATIENT
Start: 2021-06-08 | End: 2021-09-17

## 2021-06-09 ENCOUNTER — PATIENT MESSAGE (OUTPATIENT)
Dept: RHEUMATOLOGY | Facility: CLINIC | Age: 51
End: 2021-06-09

## 2021-06-09 ENCOUNTER — LAB VISIT (OUTPATIENT)
Dept: LAB | Facility: HOSPITAL | Age: 51
End: 2021-06-09
Attending: NURSE PRACTITIONER
Payer: COMMERCIAL

## 2021-06-09 ENCOUNTER — DOCUMENTATION ONLY (OUTPATIENT)
Dept: RHEUMATOLOGY | Facility: CLINIC | Age: 51
End: 2021-06-09

## 2021-06-09 DIAGNOSIS — R53.83 FATIGUE: ICD-10-CM

## 2021-06-09 DIAGNOSIS — R07.9 CHEST PAIN, UNSPECIFIED: ICD-10-CM

## 2021-06-09 DIAGNOSIS — R63.5 ABNORMAL WEIGHT GAIN: ICD-10-CM

## 2021-06-09 DIAGNOSIS — G43.911: ICD-10-CM

## 2021-06-09 DIAGNOSIS — R06.02 SHORTNESS OF BREATH: Primary | ICD-10-CM

## 2021-06-09 LAB
25(OH)D3+25(OH)D2 SERPL-MCNC: 45 NG/ML (ref 30–96)
ALBUMIN SERPL BCP-MCNC: 4.1 G/DL (ref 3.5–5.2)
ALP SERPL-CCNC: 73 U/L (ref 55–135)
ALT SERPL W/O P-5'-P-CCNC: 27 U/L (ref 10–44)
ANION GAP SERPL CALC-SCNC: 11 MMOL/L (ref 8–16)
APTT BLDCRRT: 26.5 SEC (ref 21–32)
AST SERPL-CCNC: 19 U/L (ref 10–40)
BILIRUB SERPL-MCNC: 0.8 MG/DL (ref 0.1–1)
BUN SERPL-MCNC: 16 MG/DL (ref 6–20)
CALCIUM SERPL-MCNC: 10 MG/DL (ref 8.7–10.5)
CHLORIDE SERPL-SCNC: 102 MMOL/L (ref 95–110)
CHOLEST SERPL-MCNC: 199 MG/DL (ref 120–199)
CHOLEST/HDLC SERPL: 2.3 {RATIO} (ref 2–5)
CK SERPL-CCNC: 196 U/L (ref 20–180)
CO2 SERPL-SCNC: 26 MMOL/L (ref 23–29)
CREAT SERPL-MCNC: 0.9 MG/DL (ref 0.5–1.4)
CRP SERPL-MCNC: 0.9 MG/L (ref 0–8.2)
D DIMER PPP IA.FEU-MCNC: 0.31 MG/L FEU
ERYTHROCYTE [SEDIMENTATION RATE] IN BLOOD BY WESTERGREN METHOD: 12 MM/HR (ref 0–20)
EST. GFR  (AFRICAN AMERICAN): >60 ML/MIN/1.73 M^2
EST. GFR  (NON AFRICAN AMERICAN): >60 ML/MIN/1.73 M^2
FERRITIN SERPL-MCNC: 27 NG/ML (ref 20–300)
GLUCOSE SERPL-MCNC: 112 MG/DL (ref 70–110)
HDLC SERPL-MCNC: 88 MG/DL (ref 40–75)
HDLC SERPL: 44.2 % (ref 20–50)
IRON SERPL-MCNC: 62 UG/DL (ref 30–160)
IRON SERPL-MCNC: 62 UG/DL (ref 30–160)
LDLC SERPL CALC-MCNC: 91.6 MG/DL (ref 63–159)
NONHDLC SERPL-MCNC: 111 MG/DL
POTASSIUM SERPL-SCNC: 4.4 MMOL/L (ref 3.5–5.1)
PROT SERPL-MCNC: 7.8 G/DL (ref 6–8.4)
SATURATED IRON: 14 % (ref 20–50)
SODIUM SERPL-SCNC: 139 MMOL/L (ref 136–145)
T4 FREE SERPL-MCNC: 1.01 NG/DL (ref 0.71–1.51)
TOTAL IRON BINDING CAPACITY: 454 UG/DL (ref 250–450)
TRANSFERRIN SERPL-MCNC: 307 MG/DL (ref 200–375)
TRIGL SERPL-MCNC: 97 MG/DL (ref 30–150)
TSH SERPL DL<=0.005 MIU/L-ACNC: 0.98 UIU/ML (ref 0.4–4)

## 2021-06-09 PROCEDURE — 82306 VITAMIN D 25 HYDROXY: CPT | Performed by: NURSE PRACTITIONER

## 2021-06-09 PROCEDURE — 36415 COLL VENOUS BLD VENIPUNCTURE: CPT | Mod: PO | Performed by: NURSE PRACTITIONER

## 2021-06-09 PROCEDURE — 84436 ASSAY OF TOTAL THYROXINE: CPT | Performed by: NURSE PRACTITIONER

## 2021-06-09 PROCEDURE — 86376 MICROSOMAL ANTIBODY EACH: CPT | Performed by: NURSE PRACTITIONER

## 2021-06-09 PROCEDURE — 84481 FREE ASSAY (FT-3): CPT | Performed by: NURSE PRACTITIONER

## 2021-06-09 PROCEDURE — 85651 RBC SED RATE NONAUTOMATED: CPT | Mod: PO | Performed by: NURSE PRACTITIONER

## 2021-06-09 PROCEDURE — 84439 ASSAY OF FREE THYROXINE: CPT | Performed by: NURSE PRACTITIONER

## 2021-06-09 PROCEDURE — 85730 THROMBOPLASTIN TIME PARTIAL: CPT | Performed by: NURSE PRACTITIONER

## 2021-06-09 PROCEDURE — 81003 URINALYSIS AUTO W/O SCOPE: CPT | Performed by: NURSE PRACTITIONER

## 2021-06-09 PROCEDURE — 84480 ASSAY TRIIODOTHYRONINE (T3): CPT | Performed by: NURSE PRACTITIONER

## 2021-06-09 PROCEDURE — 80061 LIPID PANEL: CPT | Performed by: NURSE PRACTITIONER

## 2021-06-09 PROCEDURE — 80053 COMPREHEN METABOLIC PANEL: CPT | Performed by: NURSE PRACTITIONER

## 2021-06-09 PROCEDURE — 82728 ASSAY OF FERRITIN: CPT | Performed by: NURSE PRACTITIONER

## 2021-06-09 PROCEDURE — 85379 FIBRIN DEGRADATION QUANT: CPT | Performed by: NURSE PRACTITIONER

## 2021-06-09 PROCEDURE — 86140 C-REACTIVE PROTEIN: CPT | Performed by: NURSE PRACTITIONER

## 2021-06-09 PROCEDURE — 84443 ASSAY THYROID STIM HORMONE: CPT | Performed by: NURSE PRACTITIONER

## 2021-06-09 PROCEDURE — 83540 ASSAY OF IRON: CPT | Performed by: NURSE PRACTITIONER

## 2021-06-09 PROCEDURE — 85025 COMPLETE CBC W/AUTO DIFF WBC: CPT | Performed by: NURSE PRACTITIONER

## 2021-06-09 PROCEDURE — 82550 ASSAY OF CK (CPK): CPT | Performed by: NURSE PRACTITIONER

## 2021-06-09 PROCEDURE — 83036 HEMOGLOBIN GLYCOSYLATED A1C: CPT | Performed by: NURSE PRACTITIONER

## 2021-06-10 DIAGNOSIS — R06.02 SHORTNESS OF BREATH: Primary | ICD-10-CM

## 2021-06-10 LAB
BASOPHILS # BLD AUTO: 0.03 K/UL (ref 0–0.2)
BASOPHILS NFR BLD: 0.5 % (ref 0–1.9)
BILIRUB UR QL STRIP: NEGATIVE
CLARITY UR REFRACT.AUTO: CLEAR
COLOR UR AUTO: NORMAL
DIFFERENTIAL METHOD: NORMAL
EOSINOPHIL # BLD AUTO: 0.2 K/UL (ref 0–0.5)
EOSINOPHIL NFR BLD: 2.7 % (ref 0–8)
ERYTHROCYTE [DISTWIDTH] IN BLOOD BY AUTOMATED COUNT: 13.2 % (ref 11.5–14.5)
ESTIMATED AVG GLUCOSE: 111 MG/DL (ref 68–131)
GLUCOSE UR QL STRIP: NEGATIVE
HBA1C MFR BLD: 5.5 % (ref 4–5.6)
HCT VFR BLD AUTO: 40.4 % (ref 37–48.5)
HGB BLD-MCNC: 13.1 G/DL (ref 12–16)
HGB UR QL STRIP: NEGATIVE
IMM GRANULOCYTES # BLD AUTO: 0.01 K/UL (ref 0–0.04)
IMM GRANULOCYTES NFR BLD AUTO: 0.2 % (ref 0–0.5)
KETONES UR QL STRIP: NEGATIVE
LEUKOCYTE ESTERASE UR QL STRIP: NEGATIVE
LYMPHOCYTES # BLD AUTO: 1.9 K/UL (ref 1–4.8)
LYMPHOCYTES NFR BLD: 32.1 % (ref 18–48)
MCH RBC QN AUTO: 30.3 PG (ref 27–31)
MCHC RBC AUTO-ENTMCNC: 32.4 G/DL (ref 32–36)
MCV RBC AUTO: 94 FL (ref 82–98)
MONOCYTES # BLD AUTO: 0.4 K/UL (ref 0.3–1)
MONOCYTES NFR BLD: 6.6 % (ref 4–15)
NEUTROPHILS # BLD AUTO: 3.4 K/UL (ref 1.8–7.7)
NEUTROPHILS NFR BLD: 57.9 % (ref 38–73)
NITRITE UR QL STRIP: NEGATIVE
NRBC BLD-RTO: 0 /100 WBC
PH UR STRIP: 6 [PH] (ref 5–8)
PLATELET # BLD AUTO: 269 K/UL (ref 150–450)
PMV BLD AUTO: 11.6 FL (ref 9.2–12.9)
PROT UR QL STRIP: NEGATIVE
RBC # BLD AUTO: 4.32 M/UL (ref 4–5.4)
SP GR UR STRIP: 1 (ref 1–1.03)
T3 SERPL-MCNC: 76 NG/DL (ref 60–180)
T3FREE SERPL-MCNC: 2.2 PG/ML (ref 2.3–4.2)
T4 SERPL-MCNC: 7.7 UG/DL (ref 4.5–11.5)
THYROPEROXIDASE IGG SERPL-ACNC: <6 IU/ML
URN SPEC COLLECT METH UR: NORMAL
WBC # BLD AUTO: 5.88 K/UL (ref 3.9–12.7)

## 2021-06-17 DIAGNOSIS — R06.02 SHORTNESS OF BREATH: Primary | ICD-10-CM

## 2021-06-18 DIAGNOSIS — L40.50 PSORIATIC ARTHRITIS: ICD-10-CM

## 2021-06-18 DIAGNOSIS — K21.00 GERD WITH ESOPHAGITIS: ICD-10-CM

## 2021-06-18 DIAGNOSIS — M47.817 LUMBAR AND SACRAL OSTEOARTHRITIS: ICD-10-CM

## 2021-06-18 DIAGNOSIS — M06.00 SERONEGATIVE RHEUMATOID ARTHRITIS: ICD-10-CM

## 2021-06-18 DIAGNOSIS — K51.919 ULCERATIVE COLITIS WITH COMPLICATION, UNSPECIFIED LOCATION: ICD-10-CM

## 2021-06-18 DIAGNOSIS — G89.4 CHRONIC PAIN SYNDROME: ICD-10-CM

## 2021-06-22 RX ORDER — CLONAZEPAM 0.5 MG/1
0.5 TABLET ORAL 2 TIMES DAILY PRN
Qty: 60 TABLET | Refills: 1 | Status: SHIPPED | OUTPATIENT
Start: 2021-06-22 | End: 2021-11-18 | Stop reason: SDUPTHER

## 2021-06-22 RX ORDER — HYDROCODONE BITARTRATE AND ACETAMINOPHEN 7.5; 325 MG/1; MG/1
1 TABLET ORAL EVERY 8 HOURS PRN
Qty: 90 TABLET | Refills: 0 | Status: SHIPPED | OUTPATIENT
Start: 2021-06-22 | End: 2021-08-23 | Stop reason: SDUPTHER

## 2021-08-16 ENCOUNTER — PATIENT MESSAGE (OUTPATIENT)
Dept: RHEUMATOLOGY | Facility: CLINIC | Age: 51
End: 2021-08-16

## 2021-08-16 DIAGNOSIS — L40.50 PSORIATIC ARTHRITIS: ICD-10-CM

## 2021-08-16 DIAGNOSIS — M06.00 SERONEGATIVE RHEUMATOID ARTHRITIS: ICD-10-CM

## 2021-08-16 DIAGNOSIS — G89.4 CHRONIC PAIN SYNDROME: ICD-10-CM

## 2021-08-25 RX ORDER — HYDROCODONE BITARTRATE AND ACETAMINOPHEN 7.5; 325 MG/1; MG/1
1 TABLET ORAL EVERY 8 HOURS PRN
Qty: 90 TABLET | Refills: 0 | Status: SHIPPED | OUTPATIENT
Start: 2021-08-25 | End: 2021-09-17 | Stop reason: SDUPTHER

## 2021-09-09 ENCOUNTER — TELEPHONE (OUTPATIENT)
Dept: RHEUMATOLOGY | Facility: CLINIC | Age: 51
End: 2021-09-09

## 2021-09-09 DIAGNOSIS — U07.1 COVID-19: Primary | ICD-10-CM

## 2021-09-11 ENCOUNTER — INFUSION (OUTPATIENT)
Dept: INFECTIOUS DISEASES | Facility: HOSPITAL | Age: 51
End: 2021-09-11
Attending: NURSE PRACTITIONER
Payer: COMMERCIAL

## 2021-09-11 VITALS
HEART RATE: 71 BPM | BODY MASS INDEX: 24.8 KG/M2 | SYSTOLIC BLOOD PRESSURE: 90 MMHG | HEIGHT: 63 IN | OXYGEN SATURATION: 95 % | WEIGHT: 140 LBS | TEMPERATURE: 99 F | RESPIRATION RATE: 19 BRPM | DIASTOLIC BLOOD PRESSURE: 54 MMHG

## 2021-09-11 DIAGNOSIS — U07.1 COVID-19: ICD-10-CM

## 2021-09-11 PROCEDURE — M0243 CASIRIVI AND IMDEVI INFUSION: HCPCS | Performed by: INTERNAL MEDICINE

## 2021-09-11 PROCEDURE — 25000003 PHARM REV CODE 250: Performed by: INTERNAL MEDICINE

## 2021-09-11 PROCEDURE — 63600175 PHARM REV CODE 636 W HCPCS: Performed by: INTERNAL MEDICINE

## 2021-09-11 RX ORDER — ALBUTEROL SULFATE 90 UG/1
2 AEROSOL, METERED RESPIRATORY (INHALATION)
Status: DISCONTINUED | OUTPATIENT
Start: 2021-09-11 | End: 2023-10-03

## 2021-09-11 RX ORDER — SODIUM CHLORIDE 0.9 % (FLUSH) 0.9 %
10 SYRINGE (ML) INJECTION
Status: DISCONTINUED | OUTPATIENT
Start: 2021-09-11 | End: 2022-05-17

## 2021-09-11 RX ORDER — DIPHENHYDRAMINE HYDROCHLORIDE 50 MG/ML
25 INJECTION INTRAMUSCULAR; INTRAVENOUS ONCE AS NEEDED
Status: DISCONTINUED | OUTPATIENT
Start: 2021-09-11 | End: 2022-05-17

## 2021-09-11 RX ORDER — ACETAMINOPHEN 325 MG/1
650 TABLET ORAL ONCE AS NEEDED
Status: ACTIVE | OUTPATIENT
Start: 2021-09-11 | End: 2033-02-07

## 2021-09-11 RX ORDER — EPINEPHRINE 0.3 MG/.3ML
0.3 INJECTION SUBCUTANEOUS
Status: DISCONTINUED | OUTPATIENT
Start: 2021-09-11 | End: 2022-05-17

## 2021-09-11 RX ORDER — ONDANSETRON 4 MG/1
4 TABLET, ORALLY DISINTEGRATING ORAL ONCE AS NEEDED
Status: DISCONTINUED | OUTPATIENT
Start: 2021-09-11 | End: 2022-05-17

## 2021-09-11 RX ADMIN — CASIRIVIMAB AND IMDEVIMAB 600 MG: 600; 600 INJECTION, SOLUTION, CONCENTRATE INTRAVENOUS at 09:09

## 2021-09-14 ENCOUNTER — PATIENT MESSAGE (OUTPATIENT)
Dept: RHEUMATOLOGY | Facility: CLINIC | Age: 51
End: 2021-09-14

## 2021-09-17 ENCOUNTER — OFFICE VISIT (OUTPATIENT)
Dept: RHEUMATOLOGY | Facility: CLINIC | Age: 51
End: 2021-09-17
Payer: COMMERCIAL

## 2021-09-17 DIAGNOSIS — G89.4 CHRONIC PAIN SYNDROME: ICD-10-CM

## 2021-09-17 DIAGNOSIS — L40.50 PSORIATIC ARTHRITIS: ICD-10-CM

## 2021-09-17 DIAGNOSIS — M06.00 SERONEGATIVE RHEUMATOID ARTHRITIS: ICD-10-CM

## 2021-09-17 PROCEDURE — 3044F PR MOST RECENT HEMOGLOBIN A1C LEVEL <7.0%: ICD-10-PCS | Mod: CPTII,95,, | Performed by: INTERNAL MEDICINE

## 2021-09-17 PROCEDURE — 99213 OFFICE O/P EST LOW 20 MIN: CPT | Mod: 95,,, | Performed by: INTERNAL MEDICINE

## 2021-09-17 PROCEDURE — 3044F HG A1C LEVEL LT 7.0%: CPT | Mod: CPTII,95,, | Performed by: INTERNAL MEDICINE

## 2021-09-17 PROCEDURE — 99213 PR OFFICE/OUTPT VISIT, EST, LEVL III, 20-29 MIN: ICD-10-PCS | Mod: 95,,, | Performed by: INTERNAL MEDICINE

## 2021-09-17 RX ORDER — HYDROCODONE BITARTRATE AND ACETAMINOPHEN 7.5; 325 MG/1; MG/1
1 TABLET ORAL EVERY 8 HOURS PRN
Qty: 90 TABLET | Refills: 0 | Status: SHIPPED | OUTPATIENT
Start: 2021-09-17 | End: 2021-11-01 | Stop reason: SDUPTHER

## 2021-10-01 ENCOUNTER — PATIENT MESSAGE (OUTPATIENT)
Dept: RHEUMATOLOGY | Facility: CLINIC | Age: 51
End: 2021-10-01

## 2021-10-01 DIAGNOSIS — L40.50 PSORIATIC ARTHRITIS: Primary | ICD-10-CM

## 2021-10-01 RX ORDER — PREDNISONE 10 MG/1
10 TABLET ORAL DAILY PRN
Qty: 30 TABLET | Refills: 3 | Status: SHIPPED | OUTPATIENT
Start: 2021-10-01 | End: 2022-01-18

## 2021-10-18 ENCOUNTER — PATIENT MESSAGE (OUTPATIENT)
Dept: RHEUMATOLOGY | Facility: CLINIC | Age: 51
End: 2021-10-18
Payer: COMMERCIAL

## 2021-10-18 DIAGNOSIS — M25.559 HIP PAIN: ICD-10-CM

## 2021-10-18 DIAGNOSIS — L40.50 PSORIATIC ARTHRITIS: Primary | ICD-10-CM

## 2021-10-19 ENCOUNTER — TELEPHONE (OUTPATIENT)
Dept: ORTHOPEDICS | Facility: CLINIC | Age: 51
End: 2021-10-19

## 2021-10-19 ENCOUNTER — CLINICAL SUPPORT (OUTPATIENT)
Dept: RHEUMATOLOGY | Facility: CLINIC | Age: 51
End: 2021-10-19
Payer: COMMERCIAL

## 2021-10-19 VITALS — DIASTOLIC BLOOD PRESSURE: 84 MMHG | HEART RATE: 81 BPM | SYSTOLIC BLOOD PRESSURE: 134 MMHG

## 2021-10-19 DIAGNOSIS — G89.4 CHRONIC PAIN SYNDROME: ICD-10-CM

## 2021-10-19 DIAGNOSIS — M06.00 SERONEGATIVE RHEUMATOID ARTHRITIS: Primary | ICD-10-CM

## 2021-10-19 DIAGNOSIS — L40.50 PSORIATIC ARTHRITIS: ICD-10-CM

## 2021-10-19 PROCEDURE — 99999 PR PBB SHADOW E&M-EST. PATIENT-LVL II: CPT | Mod: PBBFAC,,,

## 2021-10-19 PROCEDURE — 96372 THER/PROPH/DIAG INJ SC/IM: CPT | Mod: S$GLB,,, | Performed by: PHYSICIAN ASSISTANT

## 2021-10-19 PROCEDURE — 96372 PR INJECTION,THERAP/PROPH/DIAG2ST, IM OR SUBCUT: ICD-10-PCS | Mod: S$GLB,,, | Performed by: PHYSICIAN ASSISTANT

## 2021-10-19 PROCEDURE — 99999 PR PBB SHADOW E&M-EST. PATIENT-LVL II: ICD-10-PCS | Mod: PBBFAC,,,

## 2021-10-19 RX ORDER — METHYLPREDNISOLONE ACETATE 80 MG/ML
160 INJECTION, SUSPENSION INTRA-ARTICULAR; INTRALESIONAL; INTRAMUSCULAR; SOFT TISSUE
Status: COMPLETED | OUTPATIENT
Start: 2021-10-19 | End: 2021-10-19

## 2021-10-19 RX ORDER — KETOROLAC TROMETHAMINE 30 MG/ML
60 INJECTION, SOLUTION INTRAMUSCULAR; INTRAVENOUS ONCE
Status: COMPLETED | OUTPATIENT
Start: 2021-10-19 | End: 2021-10-19

## 2021-10-19 RX ORDER — CYANOCOBALAMIN 1000 UG/ML
1000 INJECTION, SOLUTION INTRAMUSCULAR; SUBCUTANEOUS
Status: COMPLETED | OUTPATIENT
Start: 2021-10-19 | End: 2021-10-19

## 2021-10-19 RX ADMIN — KETOROLAC TROMETHAMINE 60 MG: 30 INJECTION, SOLUTION INTRAMUSCULAR; INTRAVENOUS at 03:10

## 2021-10-19 RX ADMIN — METHYLPREDNISOLONE ACETATE 160 MG: 80 INJECTION, SUSPENSION INTRA-ARTICULAR; INTRALESIONAL; INTRAMUSCULAR; SOFT TISSUE at 03:10

## 2021-10-19 RX ADMIN — CYANOCOBALAMIN 1000 MCG: 1000 INJECTION, SOLUTION INTRAMUSCULAR; SUBCUTANEOUS at 02:10

## 2021-10-22 ENCOUNTER — PATIENT MESSAGE (OUTPATIENT)
Dept: RHEUMATOLOGY | Facility: CLINIC | Age: 51
End: 2021-10-22
Payer: COMMERCIAL

## 2021-10-22 ENCOUNTER — HOSPITAL ENCOUNTER (OUTPATIENT)
Dept: RADIOLOGY | Facility: HOSPITAL | Age: 51
Discharge: HOME | End: 2021-10-22
Attending: PHYSICIAN ASSISTANT
Payer: COMMERCIAL

## 2021-10-22 DIAGNOSIS — L40.50 PSORIATIC ARTHRITIS: ICD-10-CM

## 2021-10-22 PROCEDURE — 73502 X-RAY EXAM HIP UNI 2-3 VIEWS: CPT | Mod: 26,RT,, | Performed by: RADIOLOGY

## 2021-10-22 PROCEDURE — 73564 X-RAY EXAM KNEE 4 OR MORE: CPT | Mod: TC,50,FY,PO

## 2021-10-22 PROCEDURE — 73502 X-RAY EXAM HIP UNI 2-3 VIEWS: CPT | Mod: TC,FY,PO,RT

## 2021-10-22 PROCEDURE — 73502 XR HIP WITH PELVIS WHEN PERFORMED, 2 OR 3  VIEWS RIGHT: ICD-10-PCS | Mod: 26,RT,, | Performed by: RADIOLOGY

## 2021-10-22 PROCEDURE — 73564 XR KNEE ORTHO BILAT WITH FLEXION: ICD-10-PCS | Mod: 26,,, | Performed by: RADIOLOGY

## 2021-10-22 PROCEDURE — 73564 X-RAY EXAM KNEE 4 OR MORE: CPT | Mod: 26,,, | Performed by: RADIOLOGY

## 2021-10-26 ENCOUNTER — PATIENT MESSAGE (OUTPATIENT)
Dept: RHEUMATOLOGY | Facility: CLINIC | Age: 51
End: 2021-10-26
Payer: COMMERCIAL

## 2021-11-08 ENCOUNTER — DOCUMENTATION ONLY (OUTPATIENT)
Dept: RHEUMATOLOGY | Facility: CLINIC | Age: 51
End: 2021-11-08
Payer: COMMERCIAL

## 2021-11-18 DIAGNOSIS — L40.50 PSORIATIC ARTHRITIS: ICD-10-CM

## 2021-11-18 DIAGNOSIS — K21.00 GERD WITH ESOPHAGITIS: ICD-10-CM

## 2021-11-18 DIAGNOSIS — K51.919 ULCERATIVE COLITIS WITH COMPLICATION, UNSPECIFIED LOCATION: ICD-10-CM

## 2021-11-18 DIAGNOSIS — M47.817 LUMBAR AND SACRAL OSTEOARTHRITIS: ICD-10-CM

## 2021-11-18 DIAGNOSIS — M06.00 SERONEGATIVE RHEUMATOID ARTHRITIS: ICD-10-CM

## 2021-11-22 RX ORDER — CLONAZEPAM 0.5 MG/1
0.5 TABLET ORAL 2 TIMES DAILY PRN
Qty: 60 TABLET | Refills: 1 | Status: SHIPPED | OUTPATIENT
Start: 2021-11-22 | End: 2022-07-20 | Stop reason: SDUPTHER

## 2021-12-29 ENCOUNTER — PATIENT MESSAGE (OUTPATIENT)
Dept: RHEUMATOLOGY | Facility: CLINIC | Age: 51
End: 2021-12-29
Payer: COMMERCIAL

## 2021-12-30 ENCOUNTER — PATIENT MESSAGE (OUTPATIENT)
Dept: RHEUMATOLOGY | Facility: CLINIC | Age: 51
End: 2021-12-30
Payer: COMMERCIAL

## 2022-01-09 DIAGNOSIS — G89.4 CHRONIC PAIN SYNDROME: ICD-10-CM

## 2022-01-09 DIAGNOSIS — M06.00 SERONEGATIVE RHEUMATOID ARTHRITIS: ICD-10-CM

## 2022-01-09 DIAGNOSIS — L40.50 PSORIATIC ARTHRITIS: ICD-10-CM

## 2022-01-12 RX ORDER — HYDROCODONE BITARTRATE AND ACETAMINOPHEN 7.5; 325 MG/1; MG/1
1 TABLET ORAL EVERY 8 HOURS PRN
Qty: 90 TABLET | Refills: 0 | Status: SHIPPED | OUTPATIENT
Start: 2022-01-12 | End: 2022-05-10 | Stop reason: SDUPTHER

## 2022-01-14 ENCOUNTER — PATIENT MESSAGE (OUTPATIENT)
Dept: RHEUMATOLOGY | Facility: CLINIC | Age: 52
End: 2022-01-14
Payer: COMMERCIAL

## 2022-01-18 ENCOUNTER — OFFICE VISIT (OUTPATIENT)
Dept: RHEUMATOLOGY | Facility: CLINIC | Age: 52
End: 2022-01-18
Payer: COMMERCIAL

## 2022-01-18 ENCOUNTER — PATIENT MESSAGE (OUTPATIENT)
Dept: RHEUMATOLOGY | Facility: CLINIC | Age: 52
End: 2022-01-18

## 2022-01-18 VITALS
DIASTOLIC BLOOD PRESSURE: 70 MMHG | HEART RATE: 69 BPM | WEIGHT: 132 LBS | SYSTOLIC BLOOD PRESSURE: 112 MMHG | BODY MASS INDEX: 23.39 KG/M2 | HEIGHT: 63 IN

## 2022-01-18 DIAGNOSIS — M06.00 SERONEGATIVE RHEUMATOID ARTHRITIS: Primary | ICD-10-CM

## 2022-01-18 DIAGNOSIS — F32.9 MAJOR DEPRESSION, CHRONIC: ICD-10-CM

## 2022-01-18 DIAGNOSIS — M54.2 NECK PAIN: ICD-10-CM

## 2022-01-18 DIAGNOSIS — G89.4 CHRONIC PAIN SYNDROME: ICD-10-CM

## 2022-01-18 DIAGNOSIS — Z79.899 IMMUNOCOMPROMISED STATE DUE TO DRUG THERAPY: ICD-10-CM

## 2022-01-18 DIAGNOSIS — D84.821 IMMUNOCOMPROMISED STATE DUE TO DRUG THERAPY: ICD-10-CM

## 2022-01-18 DIAGNOSIS — L40.50 PSORIATIC ARTHRITIS: ICD-10-CM

## 2022-01-18 PROCEDURE — 1160F PR REVIEW ALL MEDS BY PRESCRIBER/CLIN PHARMACIST DOCUMENTED: ICD-10-PCS | Mod: CPTII,S$GLB,, | Performed by: PHYSICIAN ASSISTANT

## 2022-01-18 PROCEDURE — 96372 THER/PROPH/DIAG INJ SC/IM: CPT | Mod: S$GLB,,, | Performed by: PHYSICIAN ASSISTANT

## 2022-01-18 PROCEDURE — 3008F PR BODY MASS INDEX (BMI) DOCUMENTED: ICD-10-PCS | Mod: CPTII,S$GLB,, | Performed by: PHYSICIAN ASSISTANT

## 2022-01-18 PROCEDURE — 99999 PR PBB SHADOW E&M-EST. PATIENT-LVL V: ICD-10-PCS | Mod: PBBFAC,,, | Performed by: PHYSICIAN ASSISTANT

## 2022-01-18 PROCEDURE — 1159F PR MEDICATION LIST DOCUMENTED IN MEDICAL RECORD: ICD-10-PCS | Mod: CPTII,S$GLB,, | Performed by: PHYSICIAN ASSISTANT

## 2022-01-18 PROCEDURE — 1159F MED LIST DOCD IN RCRD: CPT | Mod: CPTII,S$GLB,, | Performed by: PHYSICIAN ASSISTANT

## 2022-01-18 PROCEDURE — 96372 PR INJECTION,THERAP/PROPH/DIAG2ST, IM OR SUBCUT: ICD-10-PCS | Mod: S$GLB,,, | Performed by: PHYSICIAN ASSISTANT

## 2022-01-18 PROCEDURE — 99215 PR OFFICE/OUTPT VISIT, EST, LEVL V, 40-54 MIN: ICD-10-PCS | Mod: 25,S$GLB,, | Performed by: PHYSICIAN ASSISTANT

## 2022-01-18 PROCEDURE — 3078F DIAST BP <80 MM HG: CPT | Mod: CPTII,S$GLB,, | Performed by: PHYSICIAN ASSISTANT

## 2022-01-18 PROCEDURE — 3074F PR MOST RECENT SYSTOLIC BLOOD PRESSURE < 130 MM HG: ICD-10-PCS | Mod: CPTII,S$GLB,, | Performed by: PHYSICIAN ASSISTANT

## 2022-01-18 PROCEDURE — 3078F PR MOST RECENT DIASTOLIC BLOOD PRESSURE < 80 MM HG: ICD-10-PCS | Mod: CPTII,S$GLB,, | Performed by: PHYSICIAN ASSISTANT

## 2022-01-18 PROCEDURE — 3074F SYST BP LT 130 MM HG: CPT | Mod: CPTII,S$GLB,, | Performed by: PHYSICIAN ASSISTANT

## 2022-01-18 PROCEDURE — 3008F BODY MASS INDEX DOCD: CPT | Mod: CPTII,S$GLB,, | Performed by: PHYSICIAN ASSISTANT

## 2022-01-18 PROCEDURE — 99215 OFFICE O/P EST HI 40 MIN: CPT | Mod: 25,S$GLB,, | Performed by: PHYSICIAN ASSISTANT

## 2022-01-18 PROCEDURE — 99999 PR PBB SHADOW E&M-EST. PATIENT-LVL V: CPT | Mod: PBBFAC,,, | Performed by: PHYSICIAN ASSISTANT

## 2022-01-18 PROCEDURE — 1160F RVW MEDS BY RX/DR IN RCRD: CPT | Mod: CPTII,S$GLB,, | Performed by: PHYSICIAN ASSISTANT

## 2022-01-18 RX ORDER — METHYLPREDNISOLONE ACETATE 80 MG/ML
160 INJECTION, SUSPENSION INTRA-ARTICULAR; INTRALESIONAL; INTRAMUSCULAR; SOFT TISSUE
Status: COMPLETED | OUTPATIENT
Start: 2022-01-18 | End: 2022-01-18

## 2022-01-18 RX ORDER — ESCITALOPRAM OXALATE 20 MG/1
20 TABLET ORAL DAILY
Qty: 90 TABLET | Refills: 1 | Status: SHIPPED | OUTPATIENT
Start: 2022-01-18 | End: 2022-08-12 | Stop reason: SDUPTHER

## 2022-01-18 RX ORDER — KETOROLAC TROMETHAMINE 30 MG/ML
60 INJECTION, SOLUTION INTRAMUSCULAR; INTRAVENOUS
Status: COMPLETED | OUTPATIENT
Start: 2022-01-18 | End: 2022-01-18

## 2022-01-18 RX ORDER — TIZANIDINE 4 MG/1
4 TABLET ORAL EVERY 8 HOURS PRN
Qty: 45 TABLET | Refills: 3 | Status: SHIPPED | OUTPATIENT
Start: 2022-01-18 | End: 2022-03-24 | Stop reason: SDUPTHER

## 2022-01-18 RX ORDER — IBUPROFEN 800 MG/1
800 TABLET ORAL 3 TIMES DAILY PRN
Qty: 90 TABLET | Refills: 3 | Status: SHIPPED | OUTPATIENT
Start: 2022-01-18 | End: 2023-10-03 | Stop reason: SDUPTHER

## 2022-01-18 RX ORDER — FAMOTIDINE 20 MG/1
20 TABLET, FILM COATED ORAL 2 TIMES DAILY
Qty: 60 TABLET | Refills: 3 | Status: SHIPPED | OUTPATIENT
Start: 2022-01-18 | End: 2023-01-18

## 2022-01-18 RX ORDER — SUMATRIPTAN SUCCINATE 100 MG/1
100 TABLET ORAL
COMMUNITY
Start: 2022-01-09

## 2022-01-18 RX ORDER — CYANOCOBALAMIN 1000 UG/ML
1000 INJECTION, SOLUTION INTRAMUSCULAR; SUBCUTANEOUS
Status: COMPLETED | OUTPATIENT
Start: 2022-01-18 | End: 2022-01-18

## 2022-01-18 RX ORDER — TOFACITINIB 11 MG/1
11 TABLET, FILM COATED, EXTENDED RELEASE ORAL DAILY
Qty: 30 TABLET | Refills: 6 | COMMUNITY
Start: 2022-01-18 | End: 2022-05-05 | Stop reason: SDUPTHER

## 2022-01-18 RX ADMIN — KETOROLAC TROMETHAMINE 60 MG: 30 INJECTION, SOLUTION INTRAMUSCULAR; INTRAVENOUS at 09:01

## 2022-01-18 RX ADMIN — METHYLPREDNISOLONE ACETATE 160 MG: 80 INJECTION, SUSPENSION INTRA-ARTICULAR; INTRALESIONAL; INTRAMUSCULAR; SOFT TISSUE at 09:01

## 2022-01-18 RX ADMIN — CYANOCOBALAMIN 1000 MCG: 1000 INJECTION, SOLUTION INTRAMUSCULAR; SUBCUTANEOUS at 09:01

## 2022-01-18 ASSESSMENT — ROUTINE ASSESSMENT OF PATIENT INDEX DATA (RAPID3)
MDHAQ FUNCTION SCORE: 1.2
PATIENT GLOBAL ASSESSMENT SCORE: 9
PSYCHOLOGICAL DISTRESS SCORE: 4.4
PAIN SCORE: 7.5
TOTAL RAPID3 SCORE: 6.83
FATIGUE SCORE: 1.1

## 2022-01-18 NOTE — PROGRESS NOTES
Subjective:       Patient ID: Osiris Billingsley is a 51 y.o. female.    Chief Complaint: Disease Management    Mrs. Billingsley is a 51 year old female who presents to clinic for follow up on rheumatoid arthritis. She is doing poorly overall. She had covid in September and received MAB infusion and then covid reinfection in 12/29/21. Respiratory sx were mild, but she has worse fatigue, HA, and low back pain. She is not doing well from arthritis standpoint. She has joint pain in her hands, wrists, elbows, ankles, and feet. She has lateral epicondylitis R and has not followed up with Orthopedics yet. She reports AM stiffness typically lasts several hours. She has swelling in her hands all the time. She is not consistently with humira dosing. Unable to get duexis right now.    She suffers with chronic migraine followed by Dr. Adams. Repeated botox recently. Concerned chronic neck pain is contributing to HA as well.     Stress has been high --working multiple jobs, taking care of her twins, and her father in law moved in permanently. Lexapro helps, but she is still frustrated and fatigued. She is open to counseling. She is sleeping 12-16 hours a day when she can.    Prior hx:  She complains of L lateral hip pain x several months that has progressed over the last few weeks. Pain is severe. She also has joint pain in her hands and knees, which she has noticed swelling in as well. She does feel Humira is helping with her arthritis with exception of hip pain. Humira is working better than Enbrel/Cimzia did previously.   Other sx include dry eyes and persistent HA. Admitted for headache at [facility] in December for several days and Dr. Adams is adjusting medications. Currently on ajovy, but reports injection site reaction following treatment. She has frontal headache, which is new from her typically occipital HA. Missed her last neurology f/u visit.  We changed lexapro to effexor at her last visit, but this has not been  helpful for her mood. She is under tremendous stress. Trying to reconcile with her . She has failed lexapro, effexor, wellbutrin, and prozac previously.     Current tx:  1. Humira  2. Norco    Review of Systems   Constitutional: Positive for activity change. Negative for appetite change, chills, fatigue and fever.   Eyes: Negative for visual disturbance.   Respiratory: Negative for cough.    Cardiovascular: Negative for chest pain.   Gastrointestinal: Negative for abdominal pain and constipation.   Musculoskeletal: Positive for arthralgias and joint swelling.   Skin: Negative for rash.   Allergic/Immunologic: Positive for immunocompromised state.   Neurological: Positive for headaches. Negative for dizziness, weakness and light-headedness.   Psychiatric/Behavioral: Positive for dysphoric mood. The patient is nervous/anxious.          Objective:     Vitals:    22 0818   BP: 112/70   Pulse: 69       Past Medical History:   Diagnosis Date    Colitis     Dry eyes     Dry mouth     Neuromuscular disorder     Premature menopause 1999    Rheumatoid arthritis      Past Surgical History:   Procedure Laterality Date     SECTION            Physical Exam   Constitutional: She is well-developed, well-nourished, and in no distress.   Eyes: Right conjunctiva is not injected. Left conjunctiva is not injected. Right eye exhibits normal extraocular motion. Left eye exhibits normal extraocular motion.   Neck: No JVD present. No thyromegaly present.   Cardiovascular: Normal rate and regular rhythm.  Exam reveals no decreased pulses.    Pulmonary/Chest: Effort normal.       Right Side Rheumatological Exam     Examination finds the shoulder, elbow, knee, 2nd PIP, 3rd PIP, 4th PIP and 5th PIP normal.    Wrist is swollen, 2nd MCP, 3rd MCP, 4th MCP, 5th MCP    Left Side Rheumatological Exam     Examination finds the shoulder, elbow, knee, 1st PIP, 1st MCP, 2nd PIP, 2nd MCP, 3rd PIP, 3rd MCP, 4th PIP, 4th MCP, 5th  PIP and 5th MCP normal.    The patient is tender to palpation of the wrist and swelling      Lymphadenopathy:     She has no cervical adenopathy.   Neurological: Gait normal.   Skin: No rash noted.     Psychiatric: Affect normal. Her mood appears anxious.   Musculoskeletal: Tenderness and edema present.      Recent labs reviewed:  Component      Latest Ref Rng & Units 6/9/2021 6/9/2021           3:06 PM  3:06 PM   WBC      3.90 - 12.70 K/uL  5.88   RBC      4.00 - 5.40 M/uL  4.32   Hemoglobin      12.0 - 16.0 g/dL  13.1   Hematocrit      37.0 - 48.5 %  40.4   MCV      82 - 98 fL  94   MCH      27.0 - 31.0 pg  30.3   MCHC      32.0 - 36.0 g/dL  32.4   RDW      11.5 - 14.5 %  13.2   Platelets      150 - 450 K/uL  269   MPV      9.2 - 12.9 fL  11.6   Immature Granulocytes      0.0 - 0.5 %  0.2   Gran # (ANC)      1.8 - 7.7 K/uL  3.4   Immature Grans (Abs)      0.00 - 0.04 K/uL  0.01   Lymph #      1.0 - 4.8 K/uL  1.9   Mono #      0.3 - 1.0 K/uL  0.4   Eos #      0.0 - 0.5 K/uL  0.2   Baso #      0.00 - 0.20 K/uL  0.03   nRBC      0 /100 WBC  0   Gran %      38.0 - 73.0 %  57.9   Lymph %      18.0 - 48.0 %  32.1   Mono %      4.0 - 15.0 %  6.6   Eosinophil %      0.0 - 8.0 %  2.7   Basophil %      0.0 - 1.9 %  0.5   Differential Method        Automated   Sodium      136 - 145 mmol/L  139   Potassium      3.5 - 5.1 mmol/L  4.4   Chloride      95 - 110 mmol/L  102   CO2      23 - 29 mmol/L  26   Glucose      70 - 110 mg/dL  112 (H)   BUN      6 - 20 mg/dL  16   Creatinine      0.5 - 1.4 mg/dL  0.9   Calcium      8.7 - 10.5 mg/dL  10.0   PROTEIN TOTAL      6.0 - 8.4 g/dL  7.8   Albumin      3.5 - 5.2 g/dL  4.1   BILIRUBIN TOTAL      0.1 - 1.0 mg/dL  0.8   Alkaline Phosphatase      55 - 135 U/L  73   AST      10 - 40 U/L  19   ALT      10 - 44 U/L  27   Anion Gap      8 - 16 mmol/L  11   eGFR if African American      >60 mL/min/1.73 m:2  >60.0   eGFR if non African American      >60 mL/min/1.73 m:2  >60.0   Specimen UA            Color, UA      Yellow, Straw, Loretta     Appearance, UA      Clear     pH, UA      5.0 - 8.0     Specific Gravity, UA      1.005 - 1.030     Protein, UA      Negative     Glucose, UA      Negative     Ketones, UA      Negative     Bilirubin (UA)      Negative     Occult Blood UA      Negative     NITRITE UA      Negative     Leukocytes, UA      Negative     Cholesterol      120 - 199 mg/dL  199   Triglycerides      30 - 150 mg/dL  97   HDL      40 - 75 mg/dL  88 (H)   LDL Cholesterol External      63.0 - 159.0 mg/dL  91.6   HDL/Cholesterol Ratio      20.0 - 50.0 %  44.2   Total Cholesterol/HDL Ratio      2.0 - 5.0  2.3   Non-HDL Cholesterol      mg/dL  111   Iron      30 - 160 ug/dL 62 62   Transferrin      200 - 375 mg/dL  307   TIBC      250 - 450 ug/dL  454 (H)   Saturated Iron      20 - 50 %  14 (L)   Hemoglobin A1C External      4.0 - 5.6 %  5.5   Estimated Avg Glucose      68 - 131 mg/dL  111   D-Dimer      <0.50 mg/L FEU  0.31   CRP      0.0 - 8.2 mg/L  0.9   Ferritin      20.0 - 300.0 ng/mL  27   aPTT      21.0 - 32.0 sec  26.5   Sed Rate      0 - 20 mm/Hr  12   T3, Total      60 - 180 ng/dL  76   T4 Total      4.5 - 11.5 ug/dL  7.7   Free T4      0.71 - 1.51 ng/dL  1.01   Thyroperoxidase Antibodies      <6.0 IU/mL  <6.0   T3, Free      2.3 - 4.2 pg/mL  2.2 (L)   TSH      0.400 - 4.000 uIU/mL  0.983   Vit D, 25-Hydroxy      30 - 96 ng/mL  45   CPK      20 - 180 U/L  196 (H)       Assessment:       1. Seronegative rheumatoid arthritis    2. Psoriatic arthritis    3. Immunocompromised state due to drug therapy    4. Chronic pain syndrome    5. Neck pain    6. Major depression, chronic            Plan:       Seronegative rheumatoid arthritis  -     CBC Auto Differential; Future; Expected date: 01/18/2022  -     Comprehensive Metabolic Panel; Future; Expected date: 01/18/2022  -     C-Reactive Protein; Future; Expected date: 01/18/2022  -     Sedimentation rate; Future; Expected date: 01/18/2022  -     TSH;  Future; Expected date: 01/18/2022  -     Hepatitis C Antibody; Future; Expected date: 01/18/2022  -     Quantiferon Gold TB; Future; Expected date: 01/18/2022  -     HEPATITIS B SURFACE ANTIBODY; Future; Expected date: 01/18/2022  -     Hepatitis B Surface Antigen; Future; Expected date: 01/18/2022  -     Hepatitis B Core Antibody, Total; Future; Expected date: 01/18/2022  -     X-Ray Cervical Spine 2 or 3 Views; Future; Expected date: 01/18/2022  -     ketorolac injection 60 mg  -     methylPREDNISolone acetate injection 160 mg  -     cyanocobalamin injection 1,000 mcg  -     ibuprofen (ADVIL,MOTRIN) 800 MG tablet; Take 1 tablet (800 mg total) by mouth 3 (three) times daily as needed for Pain.  Dispense: 90 tablet; Refill: 3  -     famotidine (PEPCID) 20 MG tablet; Take 1 tablet (20 mg total) by mouth 2 (two) times daily.  Dispense: 60 tablet; Refill: 3  -     tofacitinib (XELJANZ XR) 11 mg Tb24; Take 11 mg by mouth once daily.  Dispense: 30 tablet; Refill: 6    Psoriatic arthritis  -     CBC Auto Differential; Future; Expected date: 01/18/2022  -     Comprehensive Metabolic Panel; Future; Expected date: 01/18/2022  -     C-Reactive Protein; Future; Expected date: 01/18/2022  -     Sedimentation rate; Future; Expected date: 01/18/2022  -     TSH; Future; Expected date: 01/18/2022  -     Hepatitis C Antibody; Future; Expected date: 01/18/2022  -     Quantiferon Gold TB; Future; Expected date: 01/18/2022  -     HEPATITIS B SURFACE ANTIBODY; Future; Expected date: 01/18/2022  -     Hepatitis B Surface Antigen; Future; Expected date: 01/18/2022  -     Hepatitis B Core Antibody, Total; Future; Expected date: 01/18/2022  -     X-Ray Cervical Spine 2 or 3 Views; Future; Expected date: 01/18/2022  -     ketorolac injection 60 mg  -     methylPREDNISolone acetate injection 160 mg  -     cyanocobalamin injection 1,000 mcg  -     tiZANidine (ZANAFLEX) 4 MG tablet; Take 1 tablet (4 mg total) by mouth every 8 (eight) hours as  needed (neck pain).  Dispense: 45 tablet; Refill: 3  -     ibuprofen (ADVIL,MOTRIN) 800 MG tablet; Take 1 tablet (800 mg total) by mouth 3 (three) times daily as needed for Pain.  Dispense: 90 tablet; Refill: 3  -     famotidine (PEPCID) 20 MG tablet; Take 1 tablet (20 mg total) by mouth 2 (two) times daily.  Dispense: 60 tablet; Refill: 3  -     tofacitinib (XELJANZ XR) 11 mg Tb24; Take 11 mg by mouth once daily.  Dispense: 30 tablet; Refill: 6    Immunocompromised state due to drug therapy    Chronic pain syndrome    Neck pain  -     tiZANidine (ZANAFLEX) 4 MG tablet; Take 1 tablet (4 mg total) by mouth every 8 (eight) hours as needed (neck pain).  Dispense: 45 tablet; Refill: 3    Major depression, chronic  -     EScitalopram oxalate (LEXAPRO) 20 MG tablet; Take 1 tablet (20 mg total) by mouth once daily.  Dispense: 90 tablet; Refill: 1        Assessment:  51 year old female with  Seronegative rheumatoid arthritis, psoriatic arthritis, psoriasis, ulcerative colitis, and uveitis  --history of ARLIN 1:160 homogenous    Plan:  1. Toradol 60, depo 160, b12 today  2. D/C humira. Start xeljanz  3. Cont lexapro. Referral to counselor  4. Continue norco PRN per Dr. Devi.  I have checked [place] prescription monitoring program site and no unusual or abnormal behavior has occurred pt understand the risk and benefits of taking opioid medications and has decided to continue the medication.  5. Add zanaflex prn  6. Add ibuprofen/famotidine  7. X-ray neck, may need MRI  8. Ortho f/u for elbow injection if needed      Follow up:   4 mo Dr. Devi

## 2022-01-18 NOTE — PROGRESS NOTES
Administered B 12, 1000 mcg, and Depo Medrol 160 mg, 2cc's in Right Upper Gluteal    Administered Ketorolac 60 mg, 2cc's in Left Upper Gluteal     2 Patient Identifiers were used

## 2022-01-24 ENCOUNTER — HOSPITAL ENCOUNTER (OUTPATIENT)
Dept: RADIOLOGY | Facility: HOSPITAL | Age: 52
Discharge: HOME | End: 2022-01-24
Attending: PHYSICIAN ASSISTANT
Payer: COMMERCIAL

## 2022-01-24 DIAGNOSIS — M06.00 SERONEGATIVE RHEUMATOID ARTHRITIS: ICD-10-CM

## 2022-01-24 DIAGNOSIS — L40.50 PSORIATIC ARTHRITIS: ICD-10-CM

## 2022-01-24 PROCEDURE — 72040 XR CERVICAL SPINE 2 OR 3 VIEWS: ICD-10-PCS | Mod: 26,,, | Performed by: RADIOLOGY

## 2022-01-24 PROCEDURE — 72040 X-RAY EXAM NECK SPINE 2-3 VW: CPT | Mod: TC,FY

## 2022-01-24 PROCEDURE — 72040 X-RAY EXAM NECK SPINE 2-3 VW: CPT | Mod: 26,,, | Performed by: RADIOLOGY

## 2022-02-10 ENCOUNTER — SPECIALTY PHARMACY (OUTPATIENT)
Dept: PHARMACY | Facility: CLINIC | Age: 52
End: 2022-02-10
Payer: COMMERCIAL

## 2022-02-10 NOTE — TELEPHONE ENCOUNTER
Medication Xeljanz XR 11 mg tablet    -Rx received. Dose appropriate for rheumatoid and psoriatic arthritis. PA required.     -PA submitted CMM key: [id number]    -PA approved.   Case ID: [id number]  Coverage Start Date:01/11/2022  Coverage End Date:08/09/2022    Test claim not successful. Member must enroll in [employer]. Called member 2/11 LVM. Sent patient message through my chart as well on 2/16. Called again 2/25. Called again 3/7, no answer, LVM.     Save On # [phone]    -Unable to reach patient. Route to MD and close out at [facility].

## 2022-02-16 ENCOUNTER — PATIENT MESSAGE (OUTPATIENT)
Dept: PHARMACY | Facility: CLINIC | Age: 52
End: 2022-02-16
Payer: COMMERCIAL

## 2022-03-10 ENCOUNTER — PATIENT MESSAGE (OUTPATIENT)
Dept: RHEUMATOLOGY | Facility: CLINIC | Age: 52
End: 2022-03-10
Payer: COMMERCIAL

## 2022-03-24 ENCOUNTER — PATIENT MESSAGE (OUTPATIENT)
Dept: RHEUMATOLOGY | Facility: CLINIC | Age: 52
End: 2022-03-24
Payer: COMMERCIAL

## 2022-03-24 ENCOUNTER — TELEPHONE (OUTPATIENT)
Dept: RHEUMATOLOGY | Facility: CLINIC | Age: 52
End: 2022-03-24
Payer: COMMERCIAL

## 2022-03-24 ENCOUNTER — LAB VISIT (OUTPATIENT)
Dept: LAB | Facility: HOSPITAL | Age: 52
End: 2022-03-24
Attending: INTERNAL MEDICINE
Payer: COMMERCIAL

## 2022-03-24 DIAGNOSIS — G89.4 CHRONIC PAIN SYNDROME: ICD-10-CM

## 2022-03-24 DIAGNOSIS — M06.00 SERONEGATIVE RHEUMATOID ARTHRITIS: ICD-10-CM

## 2022-03-24 DIAGNOSIS — L40.50 PSORIATIC ARTHRITIS: ICD-10-CM

## 2022-03-24 DIAGNOSIS — R10.30 LOWER ABDOMINAL PAIN: ICD-10-CM

## 2022-03-24 DIAGNOSIS — R82.90 FOUL SMELLING URINE: ICD-10-CM

## 2022-03-24 DIAGNOSIS — M54.2 NECK PAIN: ICD-10-CM

## 2022-03-24 DIAGNOSIS — R30.0 BURNING WITH URINATION: Primary | ICD-10-CM

## 2022-03-24 LAB
ALBUMIN SERPL BCP-MCNC: 4 G/DL (ref 3.5–5.2)
ALP SERPL-CCNC: 59 U/L (ref 55–135)
ALT SERPL W/O P-5'-P-CCNC: 18 U/L (ref 10–44)
ANION GAP SERPL CALC-SCNC: 9 MMOL/L (ref 8–16)
AST SERPL-CCNC: 15 U/L (ref 10–40)
BASOPHILS # BLD AUTO: 0.04 K/UL (ref 0–0.2)
BASOPHILS NFR BLD: 0.6 % (ref 0–1.9)
BILIRUB SERPL-MCNC: 0.7 MG/DL (ref 0.1–1)
BUN SERPL-MCNC: 17 MG/DL (ref 6–20)
CALCIUM SERPL-MCNC: 10 MG/DL (ref 8.7–10.5)
CHLORIDE SERPL-SCNC: 104 MMOL/L (ref 95–110)
CO2 SERPL-SCNC: 28 MMOL/L (ref 23–29)
CREAT SERPL-MCNC: 0.8 MG/DL (ref 0.5–1.4)
CRP SERPL-MCNC: 0.4 MG/L (ref 0–8.2)
DIFFERENTIAL METHOD: NORMAL
EOSINOPHIL # BLD AUTO: 0.2 K/UL (ref 0–0.5)
EOSINOPHIL NFR BLD: 3.4 % (ref 0–8)
ERYTHROCYTE [DISTWIDTH] IN BLOOD BY AUTOMATED COUNT: 13 % (ref 11.5–14.5)
ERYTHROCYTE [SEDIMENTATION RATE] IN BLOOD BY WESTERGREN METHOD: 11 MM/HR (ref 0–36)
EST. GFR  (AFRICAN AMERICAN): >60 ML/MIN/1.73 M^2
EST. GFR  (NON AFRICAN AMERICAN): >60 ML/MIN/1.73 M^2
GLUCOSE SERPL-MCNC: 94 MG/DL (ref 70–110)
HCT VFR BLD AUTO: 43 % (ref 37–48.5)
HGB BLD-MCNC: 13.9 G/DL (ref 12–16)
IMM GRANULOCYTES # BLD AUTO: 0.02 K/UL (ref 0–0.04)
IMM GRANULOCYTES NFR BLD AUTO: 0.3 % (ref 0–0.5)
LYMPHOCYTES # BLD AUTO: 1.7 K/UL (ref 1–4.8)
LYMPHOCYTES NFR BLD: 23.3 % (ref 18–48)
MCH RBC QN AUTO: 30.9 PG (ref 27–31)
MCHC RBC AUTO-ENTMCNC: 32.3 G/DL (ref 32–36)
MCV RBC AUTO: 96 FL (ref 82–98)
MONOCYTES # BLD AUTO: 0.4 K/UL (ref 0.3–1)
MONOCYTES NFR BLD: 5.6 % (ref 4–15)
NEUTROPHILS # BLD AUTO: 4.7 K/UL (ref 1.8–7.7)
NEUTROPHILS NFR BLD: 66.8 % (ref 38–73)
NRBC BLD-RTO: 0 /100 WBC
PLATELET # BLD AUTO: 296 K/UL (ref 150–450)
PMV BLD AUTO: 11.7 FL (ref 9.2–12.9)
POTASSIUM SERPL-SCNC: 4.8 MMOL/L (ref 3.5–5.1)
PROT SERPL-MCNC: 7.5 G/DL (ref 6–8.4)
RBC # BLD AUTO: 4.5 M/UL (ref 4–5.4)
SODIUM SERPL-SCNC: 141 MMOL/L (ref 136–145)
WBC # BLD AUTO: 7.09 K/UL (ref 3.9–12.7)

## 2022-03-24 PROCEDURE — 36415 COLL VENOUS BLD VENIPUNCTURE: CPT | Mod: PO | Performed by: INTERNAL MEDICINE

## 2022-03-24 PROCEDURE — 80053 COMPREHEN METABOLIC PANEL: CPT | Performed by: INTERNAL MEDICINE

## 2022-03-24 PROCEDURE — 85652 RBC SED RATE AUTOMATED: CPT | Performed by: INTERNAL MEDICINE

## 2022-03-24 PROCEDURE — 86140 C-REACTIVE PROTEIN: CPT | Performed by: INTERNAL MEDICINE

## 2022-03-24 PROCEDURE — 85025 COMPLETE CBC W/AUTO DIFF WBC: CPT | Performed by: INTERNAL MEDICINE

## 2022-03-24 RX ORDER — TIZANIDINE 4 MG/1
4 TABLET ORAL EVERY 8 HOURS PRN
Qty: 90 TABLET | Refills: 3 | Status: SHIPPED | OUTPATIENT
Start: 2022-03-24 | End: 2022-09-26 | Stop reason: SDUPTHER

## 2022-03-24 NOTE — TELEPHONE ENCOUNTER
----- Message from Gretchen Santos sent at 3/24/2022  8:17 AM CDT -----  Contact: pt  Type: Sooner Appt Request    Caller is requesting a sooner appt.  Caller declined first available listed below.  Caller will not accept being placed on the wait list and are requesting a message be sent to the doctor.    Name of Caller:Pt     When is the 1st available appt:     Symptoms:Nurse injection Toradol and B12     Best Call Back #:[phone]    Additional Info-Please advise-Thank you~

## 2022-05-05 ENCOUNTER — PATIENT MESSAGE (OUTPATIENT)
Dept: RHEUMATOLOGY | Facility: CLINIC | Age: 52
End: 2022-05-05
Payer: COMMERCIAL

## 2022-05-05 ENCOUNTER — SPECIALTY PHARMACY (OUTPATIENT)
Dept: PHARMACY | Facility: CLINIC | Age: 52
End: 2022-05-05
Payer: COMMERCIAL

## 2022-05-05 ENCOUNTER — PATIENT MESSAGE (OUTPATIENT)
Dept: PHARMACY | Facility: CLINIC | Age: 52
End: 2022-05-05
Payer: COMMERCIAL

## 2022-05-05 DIAGNOSIS — L40.50 PSORIATIC ARTHRITIS: ICD-10-CM

## 2022-05-05 DIAGNOSIS — M06.00 SERONEGATIVE RHEUMATOID ARTHRITIS: Primary | ICD-10-CM

## 2022-05-05 DIAGNOSIS — M06.00 SERONEGATIVE RHEUMATOID ARTHRITIS: ICD-10-CM

## 2022-05-05 RX ORDER — TOFACITINIB 11 MG/1
11 TABLET, FILM COATED, EXTENDED RELEASE ORAL DAILY
Qty: 30 TABLET | Refills: 6 | Status: CANCELLED | OUTPATIENT
Start: 2022-05-05

## 2022-05-05 RX ORDER — TOFACITINIB 11 MG/1
11 TABLET, FILM COATED, EXTENDED RELEASE ORAL DAILY
Qty: 30 TABLET | Refills: 6 | Status: SHIPPED | OUTPATIENT
Start: 2022-05-05 | End: 2022-09-26

## 2022-05-05 NOTE — TELEPHONE ENCOUNTER
Demetrius, this is Isrrael Molina with [facility].  We are working on your prescription that your doctor has sent us. We will be working with your insurance to get this approved for you. We will be calling you along the way with updates on your medication.  If you have any questions, you can reach us at [phone].    Welcome call outcome: Patient/caregiver reached     Informed patient to either enroll or opt out of [employer]     Xeljanz rx received   -PA approval already on file. New PA not required.  Case ID: [id number]  Coverage Start Date:01/11/2022  Coverage End Date:08/09/2022    [facility] OON. Patient must fill at [facility].     -Patient informed via [facility] message.    -Will transfer Rx and close out at [facility].

## 2022-05-10 DIAGNOSIS — M06.00 SERONEGATIVE RHEUMATOID ARTHRITIS: ICD-10-CM

## 2022-05-10 DIAGNOSIS — G89.4 CHRONIC PAIN SYNDROME: ICD-10-CM

## 2022-05-10 DIAGNOSIS — L40.50 PSORIATIC ARTHRITIS: ICD-10-CM

## 2022-05-16 RX ORDER — HYDROCODONE BITARTRATE AND ACETAMINOPHEN 7.5; 325 MG/1; MG/1
1 TABLET ORAL EVERY 8 HOURS PRN
Qty: 90 TABLET | Refills: 0 | Status: SHIPPED | OUTPATIENT
Start: 2022-05-16 | End: 2022-05-17 | Stop reason: SDUPTHER

## 2022-05-17 ENCOUNTER — OFFICE VISIT (OUTPATIENT)
Dept: RHEUMATOLOGY | Facility: CLINIC | Age: 52
End: 2022-05-17
Payer: COMMERCIAL

## 2022-05-17 VITALS
HEIGHT: 63 IN | WEIGHT: 139.31 LBS | HEART RATE: 68 BPM | DIASTOLIC BLOOD PRESSURE: 64 MMHG | SYSTOLIC BLOOD PRESSURE: 118 MMHG | BODY MASS INDEX: 24.68 KG/M2

## 2022-05-17 DIAGNOSIS — D84.821 IMMUNOCOMPROMISED STATE DUE TO DRUG THERAPY: ICD-10-CM

## 2022-05-17 DIAGNOSIS — M06.00 SERONEGATIVE RHEUMATOID ARTHRITIS: ICD-10-CM

## 2022-05-17 DIAGNOSIS — L40.50 PSORIATIC ARTHRITIS: Primary | ICD-10-CM

## 2022-05-17 DIAGNOSIS — G89.4 CHRONIC PAIN SYNDROME: ICD-10-CM

## 2022-05-17 DIAGNOSIS — M54.2 NECK PAIN: ICD-10-CM

## 2022-05-17 DIAGNOSIS — Z79.899 IMMUNOCOMPROMISED STATE DUE TO DRUG THERAPY: ICD-10-CM

## 2022-05-17 PROCEDURE — 99215 OFFICE O/P EST HI 40 MIN: CPT | Mod: 25,S$GLB,, | Performed by: INTERNAL MEDICINE

## 2022-05-17 PROCEDURE — 96372 THER/PROPH/DIAG INJ SC/IM: CPT | Mod: S$GLB,,, | Performed by: INTERNAL MEDICINE

## 2022-05-17 PROCEDURE — 1159F PR MEDICATION LIST DOCUMENTED IN MEDICAL RECORD: ICD-10-PCS | Mod: CPTII,S$GLB,, | Performed by: INTERNAL MEDICINE

## 2022-05-17 PROCEDURE — 96372 PR INJECTION,THERAP/PROPH/DIAG2ST, IM OR SUBCUT: ICD-10-PCS | Mod: S$GLB,,, | Performed by: INTERNAL MEDICINE

## 2022-05-17 PROCEDURE — 99999 PR PBB SHADOW E&M-EST. PATIENT-LVL III: CPT | Mod: PBBFAC,,, | Performed by: INTERNAL MEDICINE

## 2022-05-17 PROCEDURE — 3008F PR BODY MASS INDEX (BMI) DOCUMENTED: ICD-10-PCS | Mod: CPTII,S$GLB,, | Performed by: INTERNAL MEDICINE

## 2022-05-17 PROCEDURE — 3008F BODY MASS INDEX DOCD: CPT | Mod: CPTII,S$GLB,, | Performed by: INTERNAL MEDICINE

## 2022-05-17 PROCEDURE — 99215 PR OFFICE/OUTPT VISIT, EST, LEVL V, 40-54 MIN: ICD-10-PCS | Mod: 25,S$GLB,, | Performed by: INTERNAL MEDICINE

## 2022-05-17 PROCEDURE — 99999 PR PBB SHADOW E&M-EST. PATIENT-LVL III: ICD-10-PCS | Mod: PBBFAC,,, | Performed by: INTERNAL MEDICINE

## 2022-05-17 PROCEDURE — 1159F MED LIST DOCD IN RCRD: CPT | Mod: CPTII,S$GLB,, | Performed by: INTERNAL MEDICINE

## 2022-05-17 RX ORDER — DEXAMETHASONE SODIUM PHOSPHATE 4 MG/ML
4 INJECTION, SOLUTION INTRA-ARTICULAR; INTRALESIONAL; INTRAMUSCULAR; INTRAVENOUS; SOFT TISSUE
Status: COMPLETED | OUTPATIENT
Start: 2022-05-17 | End: 2022-05-17

## 2022-05-17 RX ORDER — HYDROCODONE BITARTRATE AND ACETAMINOPHEN 7.5; 325 MG/1; MG/1
1 TABLET ORAL EVERY 8 HOURS PRN
Qty: 90 TABLET | Refills: 0 | Status: SHIPPED | OUTPATIENT
Start: 2022-06-15 | End: 2022-05-17 | Stop reason: SDUPTHER

## 2022-05-17 RX ORDER — LIDOCAINE AND PRILOCAINE 25; 25 MG/G; MG/G
CREAM TOPICAL
Qty: 30 G | Refills: 3 | Status: SHIPPED | OUTPATIENT
Start: 2022-05-17

## 2022-05-17 RX ORDER — HYDROCODONE BITARTRATE AND ACETAMINOPHEN 7.5; 325 MG/1; MG/1
1 TABLET ORAL EVERY 8 HOURS PRN
Qty: 90 TABLET | Refills: 0 | Status: SHIPPED | OUTPATIENT
Start: 2022-07-14 | End: 2022-08-13

## 2022-05-17 RX ORDER — DEXAMETHASONE 1 MG/1
2 TABLET ORAL EVERY 12 HOURS
Qty: 40 TABLET | Refills: 0 | Status: SHIPPED | OUTPATIENT
Start: 2022-05-17 | End: 2022-05-27

## 2022-05-17 RX ORDER — CYANOCOBALAMIN 1000 UG/ML
1000 INJECTION, SOLUTION INTRAMUSCULAR; SUBCUTANEOUS
Status: COMPLETED | OUTPATIENT
Start: 2022-05-17 | End: 2022-05-17

## 2022-05-17 RX ORDER — BUTALBITAL, ACETAMINOPHEN AND CAFFEINE 50; 325; 40 MG/1; MG/1; MG/1
1 TABLET ORAL EVERY 6 HOURS PRN
Qty: 45 TABLET | Refills: 3 | Status: SHIPPED | OUTPATIENT
Start: 2022-05-17 | End: 2022-06-16

## 2022-05-17 RX ORDER — KETOROLAC TROMETHAMINE 30 MG/ML
60 INJECTION, SOLUTION INTRAMUSCULAR; INTRAVENOUS
Status: COMPLETED | OUTPATIENT
Start: 2022-05-17 | End: 2022-05-17

## 2022-05-17 RX ORDER — RIZATRIPTAN BENZOATE 10 MG/1
10 TABLET ORAL
Qty: 12 TABLET | Refills: 3 | Status: SHIPPED | OUTPATIENT
Start: 2022-05-17 | End: 2022-06-16

## 2022-05-17 RX ADMIN — KETOROLAC TROMETHAMINE 60 MG: 30 INJECTION, SOLUTION INTRAMUSCULAR; INTRAVENOUS at 09:05

## 2022-05-17 RX ADMIN — DEXAMETHASONE SODIUM PHOSPHATE 4 MG: 4 INJECTION, SOLUTION INTRA-ARTICULAR; INTRALESIONAL; INTRAMUSCULAR; INTRAVENOUS; SOFT TISSUE at 09:05

## 2022-05-17 RX ADMIN — CYANOCOBALAMIN 1000 MCG: 1000 INJECTION, SOLUTION INTRAMUSCULAR; SUBCUTANEOUS at 01:05

## 2022-05-17 NOTE — PROGRESS NOTES
"Subjective:       Patient ID: Osiris Billingsley is a 51 y.o. female.    Chief Complaint: Disease Management    Follow up: 51 year old female who presents to clinic for follow up on rheumatoid arthritis. She is doing poorly overall.she has worse fatigue, HA, and low back pain. She is not doing well from arthritis standpoint. She has joint pain in her hands, wrists, elbows, ankles, and feet. She suffers with chronic migraine followed by Dr. Adams. Repeated botox recently. Concerned chronic neck pain is contributing to HA as well. Stress has been high --working multiple jobs, taking care of her twins, and her father in law moved in permanently. Lexapro helps, but she is still frustrated and fatigued.  She is sleeping 12-16 hours a day when she can.She has failed lexapro, effexor, wellbutrin, and prozac previously.     Current tx:  1.xeljanz  2. Norco      Review of Systems   Constitutional: Positive for activity change. Negative for fever and unexpected weight change.   HENT: Positive for mouth sores. Negative for trouble swallowing.    Eyes: Positive for redness.   Respiratory: Negative for cough and shortness of breath.    Cardiovascular: Negative for chest pain.   Gastrointestinal: Negative for constipation and diarrhea.   Genitourinary: Negative for dysuria and genital sores.   Skin: Positive for rash.   Neurological: Positive for headaches.   Hematological: Bruises/bleeds easily.         Objective:   /64   Pulse 68   Ht 5' 2.99" (1.6 m)   Wt 63.2 kg (139 lb 5.3 oz)   BMI 24.69 kg/m²      Physical Exam   Constitutional: She is oriented to person, place, and time.   HENT:   Head: Normocephalic and atraumatic.   Mouth/Throat: Oropharynx is clear and moist.   Eyes: Pupils are equal, round, and reactive to light.   Neck: No thyromegaly present.   Cardiovascular: Normal rate, regular rhythm and normal heart sounds. Exam reveals no gallop and no friction rub.   No murmur heard.  Pulmonary/Chest: Breath " sounds normal. She has no wheezes. She has no rales. She exhibits no tenderness.   Abdominal: There is no abdominal tenderness. There is no rebound and no guarding.   Musculoskeletal:      Right shoulder: Tenderness present.      Left shoulder: Tenderness present.      Right elbow: Normal.      Left elbow: Normal.      Right wrist: Tenderness present.      Left wrist: Tenderness present.      Cervical back: Neck supple.      Right knee: No effusion. Tenderness present.      Left knee: No effusion. Tenderness present.   Lymphadenopathy:     She has no cervical adenopathy.   Neurological: She is alert and oriented to person, place, and time. Gait normal.   Skin: No rash noted. No erythema. No pallor.   Psychiatric: Mood and affect normal.   Nursing note and vitals reviewed.      Right Side Rheumatological Exam     Examination finds the elbow normal.    The patient is tender to palpation of the shoulder, wrist, knee, 1st PIP, 1st MCP, 2nd PIP, 2nd MCP, 3rd PIP, 3rd MCP, 4th PIP, 4th MCP, 5th PIP and 5th MCP    She has swelling of the 1st PIP, 1st MCP, 2nd PIP, 2nd MCP, 3rd PIP, 3rd MCP, 4th PIP, 4th MCP, 5th PIP and 5th MCP    Shoulder Exam   Tenderness Location: no tenderness    Range of Motion   Active abduction: abnormal   Adduction: abnormal  Sensation: normal    Knee Exam   Patellofemoral Crepitus: positive  Effusion: negative  Sensation: normal    Hip Exam   Tenderness Location: posterior  Sensation: normal    Elbow/Wrist Exam   Tenderness Location: no tenderness  Sensation: normal    Left Side Rheumatological Exam     Examination finds the elbow normal.    The patient is tender to palpation of the shoulder, wrist, knee, 1st PIP, 1st MCP, 2nd PIP, 2nd MCP, 3rd PIP, 3rd MCP, 4th PIP, 4th MCP, 5th PIP and 5th MCP.    She has swelling of the 1st PIP, 1st MCP, 2nd PIP, 2nd MCP, 3rd PIP, 3rd MCP, 4th PIP, 4th MCP, 5th PIP and 5th MCP    Shoulder Exam   Tenderness Location: no tenderness    Range of Motion   Active  abduction: abnormal   Sensation: normal    Knee Exam     Patellofemoral Crepitus: positive  Effusion: negative  Sensation: normal    Hip Exam   Tenderness Location: posterior  Sensation: normal    Elbow/Wrist Exam   Sensation: normal      Back/Neck Exam   General Inspection   Gait: normal              Results for orders placed or performed in visit on 03/24/22   CBC Auto Differential   Result Value Ref Range    WBC 7.09 3.90 - 12.70 K/uL    RBC 4.50 4.00 - 5.40 M/uL    Hemoglobin 13.9 12.0 - 16.0 g/dL    Hematocrit 43.0 37.0 - 48.5 %    MCV 96 82 - 98 fL    MCH 30.9 27.0 - 31.0 pg    MCHC 32.3 32.0 - 36.0 g/dL    RDW 13.0 11.5 - 14.5 %    Platelets 296 150 - 450 K/uL    MPV 11.7 9.2 - 12.9 fL    Immature Granulocytes 0.3 0.0 - 0.5 %    Gran # (ANC) 4.7 1.8 - 7.7 K/uL    Immature Grans (Abs) 0.02 0.00 - 0.04 K/uL    Lymph # 1.7 1.0 - 4.8 K/uL    Mono # 0.4 0.3 - 1.0 K/uL    Eos # 0.2 0.0 - 0.5 K/uL    Baso # 0.04 0.00 - 0.20 K/uL    nRBC 0 0 /100 WBC    Gran % 66.8 38.0 - 73.0 %    Lymph % 23.3 18.0 - 48.0 %    Mono % 5.6 4.0 - 15.0 %    Eosinophil % 3.4 0.0 - 8.0 %    Basophil % 0.6 0.0 - 1.9 %    Differential Method Automated    Comprehensive Metabolic Panel   Result Value Ref Range    Sodium 141 136 - 145 mmol/L    Potassium 4.8 3.5 - 5.1 mmol/L    Chloride 104 95 - 110 mmol/L    CO2 28 23 - 29 mmol/L    Glucose 94 70 - 110 mg/dL    BUN 17 6 - 20 mg/dL    Creatinine 0.8 0.5 - 1.4 mg/dL    Calcium 10.0 8.7 - 10.5 mg/dL    Total Protein 7.5 6.0 - 8.4 g/dL    Albumin 4.0 3.5 - 5.2 g/dL    Total Bilirubin 0.7 0.1 - 1.0 mg/dL    Alkaline Phosphatase 59 55 - 135 U/L    AST 15 10 - 40 U/L    ALT 18 10 - 44 U/L    Anion Gap 9 8 - 16 mmol/L    eGFR if African American >60.0 >60 mL/min/1.73 m^2    eGFR if non African American >60.0 >60 mL/min/1.73 m^2   C-Reactive Protein   Result Value Ref Range    CRP 0.4 0.0 - 8.2 mg/L   Sedimentation rate   Result Value Ref Range    Sed Rate 11 0 - 36 mm/Hr        Assessment:       1.  Psoriatic arthritis    2. Neck pain    3. Seronegative rheumatoid arthritis    4. Chronic pain syndrome    5. Immunocompromised state due to drug therapy            Plan:       Osiris was seen today for disease management.    Diagnoses and all orders for this visit:    Psoriatic arthritis  -     butalbital-acetaminophen-caffeine -40 mg (FIORICET, ESGIC) -40 mg per tablet; Take 1 tablet by mouth every 6 (six) hours as needed for Pain.  -     rizatriptan (MAXALT) 10 MG tablet; Take 1 tablet (10 mg total) by mouth as needed for Migraine.  -     dexAMETHasone (DECADRON) 1 MG Tab; Take 2 tablets (2 mg total) by mouth every 12 (twelve) hours. for 10 days  -     dexamethasone injection 4 mg  -     ketorolac injection 60 mg  -     cyanocobalamin injection 1,000 mcg  -     Discontinue: HYDROcodone-acetaminophen (NORCO) 7.5-325 mg per tablet; Take 1 tablet by mouth every 8 (eight) hours as needed for Pain.  -     HYDROcodone-acetaminophen (NORCO) 7.5-325 mg per tablet; Take 1 tablet by mouth every 8 (eight) hours as needed for Pain.  -     Comprehensive Metabolic Panel; Future  -     CBC Auto Differential; Future  -     Sedimentation rate; Future  -     C-Reactive Protein; Future  -     ARLIN Screen w/Reflex; Future  -     Rheumatoid Factor; Future  -     Cyclic Citrullinated Peptide Antibody, IgG; Future  -     Vitamin D; Future    Neck pain  -     butalbital-acetaminophen-caffeine -40 mg (FIORICET, ESGIC) -40 mg per tablet; Take 1 tablet by mouth every 6 (six) hours as needed for Pain.  -     rizatriptan (MAXALT) 10 MG tablet; Take 1 tablet (10 mg total) by mouth as needed for Migraine.  -     dexAMETHasone (DECADRON) 1 MG Tab; Take 2 tablets (2 mg total) by mouth every 12 (twelve) hours. for 10 days  -     dexamethasone injection 4 mg  -     ketorolac injection 60 mg  -     cyanocobalamin injection 1,000 mcg  -     LIDOcaine-prilocaine (EMLA) cream; Apply topically as needed.  -     Comprehensive  Metabolic Panel; Future  -     CBC Auto Differential; Future  -     Sedimentation rate; Future  -     C-Reactive Protein; Future  -     ARLIN Screen w/Reflex; Future  -     Rheumatoid Factor; Future  -     Cyclic Citrullinated Peptide Antibody, IgG; Future  -     Vitamin D; Future    Seronegative rheumatoid arthritis  -     butalbital-acetaminophen-caffeine -40 mg (FIORICET, ESGIC) -40 mg per tablet; Take 1 tablet by mouth every 6 (six) hours as needed for Pain.  -     rizatriptan (MAXALT) 10 MG tablet; Take 1 tablet (10 mg total) by mouth as needed for Migraine.  -     dexAMETHasone (DECADRON) 1 MG Tab; Take 2 tablets (2 mg total) by mouth every 12 (twelve) hours. for 10 days  -     dexamethasone injection 4 mg  -     ketorolac injection 60 mg  -     cyanocobalamin injection 1,000 mcg  -     Discontinue: HYDROcodone-acetaminophen (NORCO) 7.5-325 mg per tablet; Take 1 tablet by mouth every 8 (eight) hours as needed for Pain.  -     HYDROcodone-acetaminophen (NORCO) 7.5-325 mg per tablet; Take 1 tablet by mouth every 8 (eight) hours as needed for Pain.  -     Comprehensive Metabolic Panel; Future  -     CBC Auto Differential; Future  -     Sedimentation rate; Future  -     C-Reactive Protein; Future  -     ARLIN Screen w/Reflex; Future  -     Rheumatoid Factor; Future  -     Cyclic Citrullinated Peptide Antibody, IgG; Future  -     Vitamin D; Future    Chronic pain syndrome  -     butalbital-acetaminophen-caffeine -40 mg (FIORICET, ESGIC) -40 mg per tablet; Take 1 tablet by mouth every 6 (six) hours as needed for Pain.  -     rizatriptan (MAXALT) 10 MG tablet; Take 1 tablet (10 mg total) by mouth as needed for Migraine.  -     dexAMETHasone (DECADRON) 1 MG Tab; Take 2 tablets (2 mg total) by mouth every 12 (twelve) hours. for 10 days  -     dexamethasone injection 4 mg  -     ketorolac injection 60 mg  -     cyanocobalamin injection 1,000 mcg  -     Discontinue: HYDROcodone-acetaminophen (NORCO)  7.5-325 mg per tablet; Take 1 tablet by mouth every 8 (eight) hours as needed for Pain.  -     HYDROcodone-acetaminophen (NORCO) 7.5-325 mg per tablet; Take 1 tablet by mouth every 8 (eight) hours as needed for Pain.  -     Comprehensive Metabolic Panel; Future  -     CBC Auto Differential; Future  -     Sedimentation rate; Future  -     C-Reactive Protein; Future  -     ARLIN Screen w/Reflex; Future  -     Rheumatoid Factor; Future  -     Cyclic Citrullinated Peptide Antibody, IgG; Future  -     Vitamin D; Future    Immunocompromised state due to drug therapy  -     dexAMETHasone (DECADRON) 1 MG Tab; Take 2 tablets (2 mg total) by mouth every 12 (twelve) hours. for 10 days  -     dexamethasone injection 4 mg  -     ketorolac injection 60 mg  -     cyanocobalamin injection 1,000 mcg  -     Comprehensive Metabolic Panel; Future  -     CBC Auto Differential; Future  -     Sedimentation rate; Future  -     C-Reactive Protein; Future  -     ARLIN Screen w/Reflex; Future  -     Rheumatoid Factor; Future  -     Cyclic Citrullinated Peptide Antibody, IgG; Future  -     Vitamin D; Future      I have  check [place] prescription monitoring program site and no unusual or abnormal behavior has occurred pt understand the risk and benefits of taking opioid medications and has decided to continue the  medication     Continue xeljanz

## 2022-05-17 NOTE — PROGRESS NOTES
Administered B12, 1000mg, 1cc and Decadron 4mg/mL,1cc in Right Upper Gluteal    Administered Ketorolac 60mg/mL, 2cc's in Left Upper Gluteal    2 Patient ID's verified and allergies reviewed    Answers for HPI/ROS submitted by the patient on 5/17/2022  fever: No  eye redness: Yes  mouth sores: Yes  headaches: Yes  shortness of breath: No  chest pain: No  trouble swallowing: No  diarrhea: No  constipation: No  unexpected weight change: No  genital sore: No  dysuria: No  During the last 3 days, have you had a skin rash?: Yes  Bruises or bleeds easily: Yes  cough: No

## 2022-07-20 ENCOUNTER — PATIENT MESSAGE (OUTPATIENT)
Dept: RHEUMATOLOGY | Facility: CLINIC | Age: 52
End: 2022-07-20
Payer: COMMERCIAL

## 2022-07-20 DIAGNOSIS — L40.50 PSORIATIC ARTHRITIS: ICD-10-CM

## 2022-07-20 DIAGNOSIS — K51.919 ULCERATIVE COLITIS WITH COMPLICATION, UNSPECIFIED LOCATION: ICD-10-CM

## 2022-07-20 DIAGNOSIS — M47.817 LUMBAR AND SACRAL OSTEOARTHRITIS: ICD-10-CM

## 2022-07-20 DIAGNOSIS — M06.00 SERONEGATIVE RHEUMATOID ARTHRITIS: ICD-10-CM

## 2022-07-20 DIAGNOSIS — K21.00 GERD WITH ESOPHAGITIS: ICD-10-CM

## 2022-07-22 ENCOUNTER — PATIENT MESSAGE (OUTPATIENT)
Dept: RHEUMATOLOGY | Facility: CLINIC | Age: 52
End: 2022-07-22
Payer: COMMERCIAL

## 2022-07-22 RX ORDER — CLONAZEPAM 0.5 MG/1
0.5 TABLET ORAL 2 TIMES DAILY PRN
Qty: 60 TABLET | Refills: 0 | Status: SHIPPED | OUTPATIENT
Start: 2022-07-22 | End: 2024-03-05 | Stop reason: SDUPTHER

## 2022-07-26 ENCOUNTER — PATIENT OUTREACH (OUTPATIENT)
Dept: RHEUMATOLOGY | Facility: CLINIC | Age: 52
End: 2022-07-26
Payer: COMMERCIAL

## 2022-07-26 ENCOUNTER — PATIENT MESSAGE (OUTPATIENT)
Dept: RHEUMATOLOGY | Facility: CLINIC | Age: 52
End: 2022-07-26
Payer: COMMERCIAL

## 2022-07-26 DIAGNOSIS — F41.9 ANXIETY AND DEPRESSION: ICD-10-CM

## 2022-07-26 DIAGNOSIS — M06.00 SERONEGATIVE RHEUMATOID ARTHRITIS: ICD-10-CM

## 2022-07-26 DIAGNOSIS — L40.50 PSORIATIC ARTHRITIS: Primary | ICD-10-CM

## 2022-07-26 DIAGNOSIS — G89.4 CHRONIC PAIN SYNDROME: ICD-10-CM

## 2022-07-26 DIAGNOSIS — F32.A ANXIETY AND DEPRESSION: ICD-10-CM

## 2022-07-26 PROCEDURE — 99358 PROLONG SERVICE W/O CONTACT: CPT | Mod: S$GLB,,, | Performed by: INTERNAL MEDICINE

## 2022-07-26 PROCEDURE — 99358 PR PROLONGED SERV,NO CONTACT,1ST HR: ICD-10-PCS | Mod: S$GLB,,, | Performed by: INTERNAL MEDICINE

## 2022-07-26 RX ORDER — DEXAMETHASONE 1 MG/1
2 TABLET ORAL DAILY
Qty: 20 TABLET | Refills: 0 | Status: SHIPPED | OUTPATIENT
Start: 2022-07-26 | End: 2022-08-05

## 2022-07-26 RX ORDER — AZITHROMYCIN 250 MG/1
TABLET, FILM COATED ORAL
Qty: 6 TABLET | Refills: 0 | Status: SHIPPED | OUTPATIENT
Start: 2022-07-26 | End: 2022-09-26

## 2022-07-26 NOTE — PROGRESS NOTES
Horrific headache, but I took migraine medicine. My eyes are burning, but I used my eyedrops, body aches, awful cough, but I have pain medicine and cough medicine. I was just wondering if there was anything to shorten it. Im also taking zinc and magnesium. The first time I had it I did an infusion, but Im not sure you can do that more than once.      covid risk score is a 1    Pt does not qualify for  Infusion, we will order paxlovid  zithromax

## 2022-07-27 ENCOUNTER — PATIENT MESSAGE (OUTPATIENT)
Dept: RHEUMATOLOGY | Facility: CLINIC | Age: 52
End: 2022-07-27
Payer: COMMERCIAL

## 2022-07-27 ENCOUNTER — TELEPHONE (OUTPATIENT)
Dept: RHEUMATOLOGY | Facility: CLINIC | Age: 52
End: 2022-07-27
Payer: COMMERCIAL

## 2022-07-27 NOTE — TELEPHONE ENCOUNTER
Send patient a MyChart msg regarding EUA infusion. Dr. Devi stated that her risk score was too low for this treament.

## 2022-08-10 ENCOUNTER — PATIENT MESSAGE (OUTPATIENT)
Dept: RHEUMATOLOGY | Facility: CLINIC | Age: 52
End: 2022-08-10
Payer: COMMERCIAL

## 2022-08-10 ENCOUNTER — PATIENT OUTREACH (OUTPATIENT)
Dept: RHEUMATOLOGY | Facility: CLINIC | Age: 52
End: 2022-08-10
Payer: COMMERCIAL

## 2022-08-10 DIAGNOSIS — K90.9 INTESTINAL MALABSORPTION, UNSPECIFIED TYPE: ICD-10-CM

## 2022-08-10 DIAGNOSIS — M25.50 POST-COVID CHRONIC JOINT PAIN: ICD-10-CM

## 2022-08-10 DIAGNOSIS — G89.29 POST-COVID CHRONIC JOINT PAIN: ICD-10-CM

## 2022-08-10 DIAGNOSIS — K21.00 GASTROESOPHAGEAL REFLUX DISEASE WITH ESOPHAGITIS, UNSPECIFIED WHETHER HEMORRHAGE: Primary | ICD-10-CM

## 2022-08-10 DIAGNOSIS — U09.9 POST-COVID CHRONIC JOINT PAIN: ICD-10-CM

## 2022-08-10 RX ORDER — PANTOPRAZOLE SODIUM 40 MG/1
40 TABLET, DELAYED RELEASE ORAL DAILY
Qty: 30 TABLET | Refills: 11 | Status: SHIPPED | OUTPATIENT
Start: 2022-08-10 | End: 2022-09-26

## 2022-08-10 NOTE — PROGRESS NOTES
She is having a flare post covid, she is having stool issues, and pain, gerd is severe. She has had severe reflux when she took paxlovid a week ago she had a negative COVID test and today she had a repeat negative COVID test so it has been close to 12 days since she was diagnosed I sent in Protonix for her reflux and cleared her to take her Xeljanz.  She will start taking 5 mg of prednisone as needed and she already has labs and a follow-up scheduled

## 2022-08-10 NOTE — TELEPHONE ENCOUNTER
"Patient stated her heartburn was severe days after COVID dx. She stated that she got Pavlovid for COVID and a Z Helder but her heartburn was so bad, that she only took one day of the antibiotic. Patient stated that her BM's are "pale" and she was concerned about it because she takes medications that affect the liver. Patient still sounded very congested so nurse advised Mucinex to thin out the congestion and to watch decongestants so it wouldn't irritate her stomach. Stated to patient that I would refer this to Dr. Devi. Also advised to patient to take another COVID test and wait another day until her sx subside before restarting Xeljanz.  "

## 2022-08-12 DIAGNOSIS — F32.9 MAJOR DEPRESSION, CHRONIC: ICD-10-CM

## 2022-08-12 RX ORDER — ESCITALOPRAM OXALATE 20 MG/1
20 TABLET ORAL DAILY
Qty: 90 TABLET | Refills: 1 | Status: SHIPPED | OUTPATIENT
Start: 2022-08-12 | End: 2023-02-13 | Stop reason: SDUPTHER

## 2022-08-29 DIAGNOSIS — G47.00 INSOMNIA, UNSPECIFIED TYPE: ICD-10-CM

## 2022-08-29 RX ORDER — TRAZODONE HYDROCHLORIDE 100 MG/1
TABLET ORAL
Qty: 90 TABLET | Refills: 3 | Status: SHIPPED | OUTPATIENT
Start: 2022-08-29 | End: 2023-07-19 | Stop reason: SDUPTHER

## 2022-09-09 ENCOUNTER — PATIENT MESSAGE (OUTPATIENT)
Dept: RHEUMATOLOGY | Facility: CLINIC | Age: 52
End: 2022-09-09
Payer: COMMERCIAL

## 2022-09-09 DIAGNOSIS — G89.4 CHRONIC PAIN SYNDROME: Primary | ICD-10-CM

## 2022-09-10 RX ORDER — HYDROCODONE BITARTRATE AND ACETAMINOPHEN 7.5; 325 MG/1; MG/1
1 TABLET ORAL EVERY 6 HOURS PRN
Qty: 90 TABLET | Refills: 0 | Status: SHIPPED | OUTPATIENT
Start: 2022-09-10 | End: 2022-09-26 | Stop reason: SDUPTHER

## 2022-09-16 DIAGNOSIS — Z12.31 OTHER SCREENING MAMMOGRAM: Primary | ICD-10-CM

## 2022-09-23 ENCOUNTER — HOSPITAL ENCOUNTER (OUTPATIENT)
Dept: RADIOLOGY | Facility: HOSPITAL | Age: 52
Discharge: HOME | End: 2022-09-23
Attending: SPECIALIST
Payer: COMMERCIAL

## 2022-09-23 DIAGNOSIS — Z12.31 OTHER SCREENING MAMMOGRAM: ICD-10-CM

## 2022-09-23 DIAGNOSIS — N63.11 UNSPECIFIED LUMP IN THE RIGHT BREAST, UPPER OUTER QUADRANT: Primary | ICD-10-CM

## 2022-09-23 PROCEDURE — 77063 BREAST TOMOSYNTHESIS BI: CPT | Mod: TC,PO

## 2022-09-23 PROCEDURE — 77067 SCR MAMMO BI INCL CAD: CPT | Mod: TC,PO

## 2022-09-26 ENCOUNTER — OFFICE VISIT (OUTPATIENT)
Dept: RHEUMATOLOGY | Facility: CLINIC | Age: 52
End: 2022-09-26
Payer: COMMERCIAL

## 2022-09-26 VITALS
BODY MASS INDEX: 24.84 KG/M2 | HEART RATE: 76 BPM | DIASTOLIC BLOOD PRESSURE: 81 MMHG | SYSTOLIC BLOOD PRESSURE: 128 MMHG | HEIGHT: 63 IN | WEIGHT: 140.19 LBS

## 2022-09-26 DIAGNOSIS — G89.4 CHRONIC PAIN SYNDROME: ICD-10-CM

## 2022-09-26 DIAGNOSIS — M06.00 SERONEGATIVE RHEUMATOID ARTHRITIS: ICD-10-CM

## 2022-09-26 DIAGNOSIS — K59.09 CHRONIC CONSTIPATION: ICD-10-CM

## 2022-09-26 DIAGNOSIS — D84.821 IMMUNOCOMPROMISED STATE DUE TO DRUG THERAPY: ICD-10-CM

## 2022-09-26 DIAGNOSIS — L40.50 PSORIATIC ARTHRITIS: Primary | ICD-10-CM

## 2022-09-26 DIAGNOSIS — Z79.899 IMMUNOCOMPROMISED STATE DUE TO DRUG THERAPY: ICD-10-CM

## 2022-09-26 DIAGNOSIS — E78.5 HYPERLIPIDEMIA, UNSPECIFIED HYPERLIPIDEMIA TYPE: ICD-10-CM

## 2022-09-26 DIAGNOSIS — M54.2 NECK PAIN: ICD-10-CM

## 2022-09-26 PROCEDURE — 1160F PR REVIEW ALL MEDS BY PRESCRIBER/CLIN PHARMACIST DOCUMENTED: ICD-10-PCS | Mod: CPTII,S$GLB,, | Performed by: PHYSICIAN ASSISTANT

## 2022-09-26 PROCEDURE — 1160F RVW MEDS BY RX/DR IN RCRD: CPT | Mod: CPTII,S$GLB,, | Performed by: PHYSICIAN ASSISTANT

## 2022-09-26 PROCEDURE — 96372 THER/PROPH/DIAG INJ SC/IM: CPT | Mod: S$GLB,,, | Performed by: PHYSICIAN ASSISTANT

## 2022-09-26 PROCEDURE — 99999 PR PBB SHADOW E&M-EST. PATIENT-LVL IV: ICD-10-PCS | Mod: PBBFAC,,, | Performed by: PHYSICIAN ASSISTANT

## 2022-09-26 PROCEDURE — 99214 PR OFFICE/OUTPT VISIT, EST, LEVL IV, 30-39 MIN: ICD-10-PCS | Mod: 25,S$GLB,, | Performed by: PHYSICIAN ASSISTANT

## 2022-09-26 PROCEDURE — 3008F BODY MASS INDEX DOCD: CPT | Mod: CPTII,S$GLB,, | Performed by: PHYSICIAN ASSISTANT

## 2022-09-26 PROCEDURE — 99999 PR PBB SHADOW E&M-EST. PATIENT-LVL IV: CPT | Mod: PBBFAC,,, | Performed by: PHYSICIAN ASSISTANT

## 2022-09-26 PROCEDURE — 1159F PR MEDICATION LIST DOCUMENTED IN MEDICAL RECORD: ICD-10-PCS | Mod: CPTII,S$GLB,, | Performed by: PHYSICIAN ASSISTANT

## 2022-09-26 PROCEDURE — 1159F MED LIST DOCD IN RCRD: CPT | Mod: CPTII,S$GLB,, | Performed by: PHYSICIAN ASSISTANT

## 2022-09-26 PROCEDURE — 96372 PR INJECTION,THERAP/PROPH/DIAG2ST, IM OR SUBCUT: ICD-10-PCS | Mod: S$GLB,,, | Performed by: PHYSICIAN ASSISTANT

## 2022-09-26 PROCEDURE — 99214 OFFICE O/P EST MOD 30 MIN: CPT | Mod: 25,S$GLB,, | Performed by: PHYSICIAN ASSISTANT

## 2022-09-26 PROCEDURE — 3008F PR BODY MASS INDEX (BMI) DOCUMENTED: ICD-10-PCS | Mod: CPTII,S$GLB,, | Performed by: PHYSICIAN ASSISTANT

## 2022-09-26 RX ORDER — PROGESTERONE 200 MG/1
200 CAPSULE ORAL NIGHTLY
COMMUNITY
Start: 2022-09-06

## 2022-09-26 RX ORDER — METHYLPREDNISOLONE ACETATE 80 MG/ML
160 INJECTION, SUSPENSION INTRA-ARTICULAR; INTRALESIONAL; INTRAMUSCULAR; SOFT TISSUE
Status: COMPLETED | OUTPATIENT
Start: 2022-09-26 | End: 2022-09-26

## 2022-09-26 RX ORDER — CYANOCOBALAMIN 1000 UG/ML
1000 INJECTION, SOLUTION INTRAMUSCULAR; SUBCUTANEOUS
Status: COMPLETED | OUTPATIENT
Start: 2022-09-26 | End: 2022-09-26

## 2022-09-26 RX ORDER — SEMAGLUTIDE 1.34 MG/ML
INJECTION, SOLUTION SUBCUTANEOUS
COMMUNITY
Start: 2022-08-29 | End: 2023-04-20 | Stop reason: SDUPTHER

## 2022-09-26 RX ORDER — TIZANIDINE 4 MG/1
4 TABLET ORAL EVERY 8 HOURS PRN
Qty: 90 TABLET | Refills: 3 | Status: SHIPPED | OUTPATIENT
Start: 2022-09-26 | End: 2023-05-15 | Stop reason: SDUPTHER

## 2022-09-26 RX ORDER — KETOROLAC TROMETHAMINE 30 MG/ML
60 INJECTION, SOLUTION INTRAMUSCULAR; INTRAVENOUS
Status: COMPLETED | OUTPATIENT
Start: 2022-09-26 | End: 2022-09-26

## 2022-09-26 RX ADMIN — CYANOCOBALAMIN 1000 MCG: 1000 INJECTION, SOLUTION INTRAMUSCULAR; SUBCUTANEOUS at 03:09

## 2022-09-26 RX ADMIN — METHYLPREDNISOLONE ACETATE 160 MG: 80 INJECTION, SUSPENSION INTRA-ARTICULAR; INTRALESIONAL; INTRAMUSCULAR; SOFT TISSUE at 03:09

## 2022-09-26 RX ADMIN — KETOROLAC TROMETHAMINE 60 MG: 30 INJECTION, SOLUTION INTRAMUSCULAR; INTRAVENOUS at 03:09

## 2022-09-26 NOTE — PROGRESS NOTES
2 Patient ID's verified and allergies reviewed          Administered B12, 1000mcg, 1cc in Right Arm    Administered Ketorolac 60mg/mL, 2cc's in Left Upper Gluteal    Administered Depo Medrol 160mgs  -2(80mg/mL), 2cc's in Right Upper Gluteal       Patient tolerated injections well and left the facility in stable condition  Answers submitted by the patient for this visit:  Rheumatology Questionnaire (Submitted on 9/23/2022)  fever: Yes  eye redness: Yes  mouth sores: No  headaches: Yes  shortness of breath: Yes  chest pain: No  trouble swallowing: No  diarrhea: No  constipation: Yes  unexpected weight change: No  genital sore: Yes  dysuria: Yes  During the last 3 days, have you had a skin rash?: No  Bruises or bleeds easily: Yes  cough: No

## 2022-09-26 NOTE — PROGRESS NOTES
Subjective:       Patient ID: Osiris Billingsley is a 51 y.o. female.    Chief Complaint: Disease Management    Mrs. Billingsley is a 51 year old female who presents to clinic for follow up on rheumatoid arthritis, psoriatic arthritis, and UC. She is doing poorly overall. She continues to have joint pain and tendonitis involving her hands, knees, and elbows. She has joint pain in her wrists and feet. She is not sure if xeljanz has helped with her symptoms--treatment was changed in 2022.     She recently had lipid panel--total cholesterol >300, trig 222, , which were previously well controlled 1 year ago. A1c 5.7% and started on ozempic and having mild nausea.     She has chronic constipation and would like to resume linzess.    Tizanidine helps with neck/headache pain.    She is waiting diagnostic mammogram/US for abnormal screening mammogram.    Current treatment:  1. xeljanz  2. Norco    Prior treatment:  Humira  Enbrel    Review of Systems   Constitutional: Positive for activity change. Negative for appetite change, chills, fatigue and fever.   Eyes: Negative for visual disturbance.   Respiratory: Negative for cough.    Cardiovascular: Negative for chest pain.   Gastrointestinal: Negative for abdominal pain and constipation.   Musculoskeletal: Positive for arthralgias and joint swelling.   Skin: Negative for rash.   Allergic/Immunologic: Positive for immunocompromised state.   Neurological: Positive for headaches. Negative for dizziness, weakness and light-headedness.   Psychiatric/Behavioral: Negative for anxiety.         Objective:     Vitals:    22 1415   BP: 128/81   Pulse: 76       Past Medical History:   Diagnosis Date    Colitis     Dry eyes     Dry mouth     Neuromuscular disorder     Premature menopause     Rheumatoid arthritis      Past Surgical History:   Procedure Laterality Date     SECTION            Physical Exam   Constitutional: She is well-developed, well-nourished,  and in no distress.   Eyes: Right conjunctiva is not injected. Left conjunctiva is not injected.   Neck: No JVD present. No thyromegaly present.   Cardiovascular: Normal rate and regular rhythm.  Exam reveals no decreased pulses.    Pulmonary/Chest: Effort normal.       Right Side Rheumatological Exam     Examination finds the shoulder, elbow, knee, 2nd PIP, 3rd PIP, 4th PIP and 5th PIP normal.    Wrist is swollen, 2nd MCP, 3rd MCP, 4th MCP, 5th MCP    TTP of quad tendon.    Left Side Rheumatological Exam     Examination finds the shoulder, elbow, knee, 1st PIP, 1st MCP, 2nd PIP, 2nd MCP, 3rd PIP, 3rd MCP, 4th PIP, 4th MCP, 5th PIP and 5th MCP normal.    The patient is tender to palpation of the wrist and swelling      Lymphadenopathy:     She has no cervical adenopathy.   Neurological: Gait normal.   Skin: No rash noted.     Psychiatric: Affect normal.   Musculoskeletal: Tenderness and edema present.      External labs reviewed on her phone.  Assessment:       1. Psoriatic arthritis    2. Seronegative rheumatoid arthritis    3. Neck pain    4. Immunocompromised state due to drug therapy    5. Hyperlipidemia, unspecified hyperlipidemia type    6. Chronic constipation              Plan:       Psoriatic arthritis  -     tiZANidine (ZANAFLEX) 4 MG tablet; Take 1 tablet (4 mg total) by mouth every 8 (eight) hours as needed (neck pain).  Dispense: 90 tablet; Refill: 3  -     ketorolac injection 60 mg  -     methylPREDNISolone acetate injection 160 mg  -     cyanocobalamin injection 1,000 mcg    Seronegative rheumatoid arthritis  -     ketorolac injection 60 mg  -     methylPREDNISolone acetate injection 160 mg  -     cyanocobalamin injection 1,000 mcg    Neck pain  -     tiZANidine (ZANAFLEX) 4 MG tablet; Take 1 tablet (4 mg total) by mouth every 8 (eight) hours as needed (neck pain).  Dispense: 90 tablet; Refill: 3    Immunocompromised state due to drug therapy    Hyperlipidemia, unspecified hyperlipidemia type  -      Lipid Panel; Future; Expected date: 09/26/2022    Chronic constipation  -     linaCLOtide (LINZESS) 145 mcg Cap capsule; Take 1 capsule (145 mcg total) by mouth before breakfast.  Dispense: 30 capsule; Refill: 5        Assessment:  51 year old female with  Seronegative rheumatoid arthritis, psoriatic arthritis, psoriasis, ulcerative colitis, and uveitis  --history of ARLIN 1:160 homogenous    Plan:  1. D/C xeljanz--lipid elevation likely medication s/e. Repeat lipid panel in 3-4 months  2. Toradol 60,d epo 160, b12 today  3. Tizanidine prn  4. Restart linzess 145 mg prn  5. Plan to restart Humira vs. Simponi vs. Stelara after mammogram completed.  6. [place] pended for MD.  I have checked [place] prescription monitoring program site and no unusual or abnormal behavior has occurred pt understand the risk and benefits of taking opioid medications and has decided to continue the medication.      Follow up:   4 mo Dr. Devi

## 2022-09-30 ENCOUNTER — HOSPITAL ENCOUNTER (OUTPATIENT)
Dept: RADIOLOGY | Facility: HOSPITAL | Age: 52
Discharge: HOME | End: 2022-09-30
Attending: OBSTETRICS & GYNECOLOGY
Payer: COMMERCIAL

## 2022-09-30 DIAGNOSIS — N63.11 UNSPECIFIED LUMP IN THE RIGHT BREAST, UPPER OUTER QUADRANT: ICD-10-CM

## 2022-09-30 PROCEDURE — 77065 DX MAMMO INCL CAD UNI: CPT | Mod: TC,PO,RT

## 2022-10-03 RX ORDER — HYDROCODONE BITARTRATE AND ACETAMINOPHEN 7.5; 325 MG/1; MG/1
1 TABLET ORAL EVERY 8 HOURS PRN
Qty: 90 TABLET | Refills: 0 | Status: SHIPPED | OUTPATIENT
Start: 2022-11-09 | End: 2023-02-22 | Stop reason: SDUPTHER

## 2022-10-03 RX ORDER — HYDROCODONE BITARTRATE AND ACETAMINOPHEN 7.5; 325 MG/1; MG/1
1 TABLET ORAL EVERY 8 HOURS PRN
Qty: 90 TABLET | Refills: 0 | Status: SHIPPED | OUTPATIENT
Start: 2022-10-10 | End: 2023-02-25

## 2022-10-12 ENCOUNTER — PATIENT MESSAGE (OUTPATIENT)
Dept: RHEUMATOLOGY | Facility: CLINIC | Age: 52
End: 2022-10-12
Payer: COMMERCIAL

## 2022-10-12 DIAGNOSIS — M06.00 SERONEGATIVE RHEUMATOID ARTHRITIS: ICD-10-CM

## 2022-10-12 DIAGNOSIS — L40.50 PSORIATIC ARTHRITIS: Primary | ICD-10-CM

## 2022-10-13 RX ORDER — ADALIMUMAB 40MG/0.4ML
40 KIT SUBCUTANEOUS
Qty: 2 PEN | Refills: 6 | Status: SHIPPED | OUTPATIENT
Start: 2022-10-13 | End: 2022-11-15 | Stop reason: SDUPTHER

## 2022-10-14 ENCOUNTER — SPECIALTY PHARMACY (OUTPATIENT)
Dept: PHARMACY | Facility: CLINIC | Age: 52
End: 2022-10-14
Payer: COMMERCIAL

## 2022-10-14 DIAGNOSIS — M06.00 SERONEGATIVE RHEUMATOID ARTHRITIS: Primary | ICD-10-CM

## 2022-10-14 DIAGNOSIS — L40.50 PSORIATIC ARTHRITIS: ICD-10-CM

## 2022-10-14 NOTE — TELEPHONE ENCOUNTER
"Demetrius, this is Mayr Negro with [facility].  We are working on your prescription that your doctor has sent us. We will be working with your insurance to get this approved for you. We will be calling you along the way with updates on your medication.  If you have any questions, you can reach us at [phone].    Welcome call outcome: Left voicemail    New order for Humira received.  Attempted to submit via [employer] website and [employer].  Both forms stated "plan does not manage PA for this patient."  Outgoing call to insurance to verify who manages PA for pt.  Rep Narayanan stated that [employer] manages PAs.    Humira PA submitted via online with [employer] ([email or web address])  EOC #[id number].  "

## 2022-10-18 ENCOUNTER — PATIENT MESSAGE (OUTPATIENT)
Dept: PHARMACY | Facility: CLINIC | Age: 52
End: 2022-10-18
Payer: COMMERCIAL

## 2022-10-18 NOTE — TELEPHONE ENCOUNTER
Morgan MIRANDA approved until 4/17/23.  OSP is OON.  Pt is required to fill at [facility].  Outgoing call to pt to inform her of approval.  No answer, LVM.  Sent [employer] message about approval and link to enroll in copay card.  Closing pt out.

## 2022-10-24 DIAGNOSIS — B02.9 HERPES ZOSTER WITHOUT COMPLICATION: ICD-10-CM

## 2022-10-28 RX ORDER — VALACYCLOVIR HYDROCHLORIDE 1 G/1
TABLET, FILM COATED ORAL
Qty: 60 TABLET | Refills: 1 | Status: SHIPPED | OUTPATIENT
Start: 2022-10-28 | End: 2023-10-03

## 2022-11-15 DIAGNOSIS — G89.4 CHRONIC PAIN SYNDROME: ICD-10-CM

## 2022-11-15 RX ORDER — HYDROCODONE BITARTRATE AND ACETAMINOPHEN 7.5; 325 MG/1; MG/1
1 TABLET ORAL EVERY 8 HOURS PRN
Qty: 90 TABLET | Refills: 0 | Status: CANCELLED | OUTPATIENT
Start: 2022-11-15

## 2022-11-21 ENCOUNTER — PATIENT MESSAGE (OUTPATIENT)
Dept: RHEUMATOLOGY | Facility: CLINIC | Age: 52
End: 2022-11-21
Payer: COMMERCIAL

## 2022-11-21 NOTE — TELEPHONE ENCOUNTER
----- Message from Selam Weiss, Patient Care Assistant sent at 11/21/2022 10:12 AM CST -----  Contact: self  Pt has blisters and its red, she is worried it can be shingles. Please call back to advise [phone]

## 2022-11-23 ENCOUNTER — TELEPHONE (OUTPATIENT)
Dept: RHEUMATOLOGY | Facility: CLINIC | Age: 52
End: 2022-11-23
Payer: COMMERCIAL

## 2022-11-23 NOTE — TELEPHONE ENCOUNTER
----- Message from Selam Weiss, Patient Care Assistant sent at 11/21/2022 10:12 AM CST -----  Contact: self  Pt has blisters and its red, she is worried it can be shingles. Please call back to advise [phone]

## 2022-12-12 ENCOUNTER — PATIENT MESSAGE (OUTPATIENT)
Dept: RHEUMATOLOGY | Facility: CLINIC | Age: 52
End: 2022-12-12
Payer: COMMERCIAL

## 2023-02-20 NOTE — TELEPHONE ENCOUNTER
Pt last seen 11/2017, pt has cancelled scheduled follow ups. No appt scheduled for f/u. Last refilled 4/4/2018.    oriented to person, place and time [place]

## 2023-02-22 DIAGNOSIS — M06.00 SERONEGATIVE RHEUMATOID ARTHRITIS: ICD-10-CM

## 2023-02-22 DIAGNOSIS — G89.4 CHRONIC PAIN SYNDROME: ICD-10-CM

## 2023-02-22 DIAGNOSIS — L40.50 PSORIATIC ARTHRITIS: ICD-10-CM

## 2023-02-22 DIAGNOSIS — R92.8 ABNORMAL MAMMOGRAM: Primary | ICD-10-CM

## 2023-02-24 RX ORDER — ADALIMUMAB 40MG/0.4ML
40 KIT SUBCUTANEOUS
Qty: 2 PEN | Refills: 6 | Status: SHIPPED | OUTPATIENT
Start: 2023-02-24 | End: 2023-05-16 | Stop reason: SDUPTHER

## 2023-02-25 RX ORDER — HYDROCODONE BITARTRATE AND ACETAMINOPHEN 7.5; 325 MG/1; MG/1
1 TABLET ORAL EVERY 8 HOURS PRN
Qty: 90 TABLET | Refills: 0 | Status: SHIPPED | OUTPATIENT
Start: 2023-02-25 | End: 2023-04-20 | Stop reason: SDUPTHER

## 2023-03-27 ENCOUNTER — HOSPITAL ENCOUNTER (OUTPATIENT)
Dept: RADIOLOGY | Facility: HOSPITAL | Age: 53
Discharge: HOME | End: 2023-03-27
Attending: SPECIALIST
Payer: COMMERCIAL

## 2023-03-27 DIAGNOSIS — R92.8 ABNORMAL MAMMOGRAM: ICD-10-CM

## 2023-03-27 PROCEDURE — 77066 DX MAMMO INCL CAD BI: CPT | Mod: TC,PO

## 2023-04-20 ENCOUNTER — OFFICE VISIT (OUTPATIENT)
Dept: RHEUMATOLOGY | Facility: CLINIC | Age: 53
End: 2023-04-20
Payer: COMMERCIAL

## 2023-04-20 VITALS
SYSTOLIC BLOOD PRESSURE: 129 MMHG | HEART RATE: 80 BPM | DIASTOLIC BLOOD PRESSURE: 82 MMHG | WEIGHT: 125 LBS | BODY MASS INDEX: 22.15 KG/M2 | HEIGHT: 63 IN

## 2023-04-20 DIAGNOSIS — K90.9 INTESTINAL MALABSORPTION, UNSPECIFIED TYPE: ICD-10-CM

## 2023-04-20 DIAGNOSIS — D84.821 IMMUNOCOMPROMISED STATE DUE TO DRUG THERAPY: ICD-10-CM

## 2023-04-20 DIAGNOSIS — M06.00 SERONEGATIVE RHEUMATOID ARTHRITIS: ICD-10-CM

## 2023-04-20 DIAGNOSIS — E09.69 DRUG OR CHEMICAL INDUCED DIABETES MELLITUS WITH OTHER SPECIFIED COMPLICATION, UNSPECIFIED WHETHER LONG TERM INSULIN USE: ICD-10-CM

## 2023-04-20 DIAGNOSIS — L40.50 PSORIATIC ARTHRITIS: Primary | ICD-10-CM

## 2023-04-20 DIAGNOSIS — M54.2 ANTERIOR NECK PAIN: ICD-10-CM

## 2023-04-20 DIAGNOSIS — G89.4 CHRONIC PAIN SYNDROME: ICD-10-CM

## 2023-04-20 DIAGNOSIS — Z79.899 IMMUNOCOMPROMISED STATE DUE TO DRUG THERAPY: ICD-10-CM

## 2023-04-20 PROCEDURE — 96372 THER/PROPH/DIAG INJ SC/IM: CPT | Mod: S$GLB,,, | Performed by: INTERNAL MEDICINE

## 2023-04-20 PROCEDURE — 99999 PR PBB SHADOW E&M-EST. PATIENT-LVL IV: ICD-10-PCS | Mod: PBBFAC,,, | Performed by: INTERNAL MEDICINE

## 2023-04-20 PROCEDURE — 3074F SYST BP LT 130 MM HG: CPT | Mod: CPTII,S$GLB,, | Performed by: INTERNAL MEDICINE

## 2023-04-20 PROCEDURE — 3008F PR BODY MASS INDEX (BMI) DOCUMENTED: ICD-10-PCS | Mod: CPTII,S$GLB,, | Performed by: INTERNAL MEDICINE

## 2023-04-20 PROCEDURE — 96372 PR INJECTION,THERAP/PROPH/DIAG2ST, IM OR SUBCUT: ICD-10-PCS | Mod: S$GLB,,, | Performed by: INTERNAL MEDICINE

## 2023-04-20 PROCEDURE — 1159F PR MEDICATION LIST DOCUMENTED IN MEDICAL RECORD: ICD-10-PCS | Mod: CPTII,S$GLB,, | Performed by: INTERNAL MEDICINE

## 2023-04-20 PROCEDURE — 3079F PR MOST RECENT DIASTOLIC BLOOD PRESSURE 80-89 MM HG: ICD-10-PCS | Mod: CPTII,S$GLB,, | Performed by: INTERNAL MEDICINE

## 2023-04-20 PROCEDURE — 99999 PR PBB SHADOW E&M-EST. PATIENT-LVL IV: CPT | Mod: PBBFAC,,, | Performed by: INTERNAL MEDICINE

## 2023-04-20 PROCEDURE — 99215 OFFICE O/P EST HI 40 MIN: CPT | Mod: 25,S$GLB,, | Performed by: INTERNAL MEDICINE

## 2023-04-20 PROCEDURE — 3074F PR MOST RECENT SYSTOLIC BLOOD PRESSURE < 130 MM HG: ICD-10-PCS | Mod: CPTII,S$GLB,, | Performed by: INTERNAL MEDICINE

## 2023-04-20 PROCEDURE — 3008F BODY MASS INDEX DOCD: CPT | Mod: CPTII,S$GLB,, | Performed by: INTERNAL MEDICINE

## 2023-04-20 PROCEDURE — 99215 PR OFFICE/OUTPT VISIT, EST, LEVL V, 40-54 MIN: ICD-10-PCS | Mod: 25,S$GLB,, | Performed by: INTERNAL MEDICINE

## 2023-04-20 PROCEDURE — 1159F MED LIST DOCD IN RCRD: CPT | Mod: CPTII,S$GLB,, | Performed by: INTERNAL MEDICINE

## 2023-04-20 PROCEDURE — 3079F DIAST BP 80-89 MM HG: CPT | Mod: CPTII,S$GLB,, | Performed by: INTERNAL MEDICINE

## 2023-04-20 RX ORDER — HYDROCODONE BITARTRATE AND ACETAMINOPHEN 7.5; 325 MG/1; MG/1
1 TABLET ORAL EVERY 8 HOURS PRN
Qty: 90 TABLET | Refills: 0 | Status: SHIPPED | OUTPATIENT
Start: 2023-06-16 | End: 2023-07-16

## 2023-04-20 RX ORDER — HYDROCODONE BITARTRATE AND ACETAMINOPHEN 7.5; 325 MG/1; MG/1
1 TABLET ORAL EVERY 8 HOURS PRN
Qty: 90 TABLET | Refills: 0 | Status: SHIPPED | OUTPATIENT
Start: 2023-05-18 | End: 2023-06-17

## 2023-04-20 RX ORDER — KETOROLAC TROMETHAMINE 30 MG/ML
60 INJECTION, SOLUTION INTRAMUSCULAR; INTRAVENOUS
Status: COMPLETED | OUTPATIENT
Start: 2023-04-20 | End: 2023-04-20

## 2023-04-20 RX ORDER — CYANOCOBALAMIN 1000 UG/ML
1000 INJECTION, SOLUTION INTRAMUSCULAR; SUBCUTANEOUS
Status: COMPLETED | OUTPATIENT
Start: 2023-04-20 | End: 2023-04-20

## 2023-04-20 RX ORDER — HYDROCODONE BITARTRATE AND ACETAMINOPHEN 7.5; 325 MG/1; MG/1
1 TABLET ORAL EVERY 8 HOURS PRN
Qty: 90 TABLET | Refills: 0 | Status: SHIPPED | OUTPATIENT
Start: 2023-04-20 | End: 2023-10-03 | Stop reason: SDUPTHER

## 2023-04-20 RX ORDER — METHYLPREDNISOLONE ACETATE 80 MG/ML
160 INJECTION, SUSPENSION INTRA-ARTICULAR; INTRALESIONAL; INTRAMUSCULAR; SOFT TISSUE
Status: COMPLETED | OUTPATIENT
Start: 2023-04-20 | End: 2023-04-20

## 2023-04-20 RX ORDER — SEMAGLUTIDE 1.34 MG/ML
0.5 INJECTION, SOLUTION SUBCUTANEOUS
Qty: 1.5 ML | Refills: 11 | Status: SHIPPED | OUTPATIENT
Start: 2023-04-20 | End: 2024-01-04

## 2023-04-20 RX ADMIN — CYANOCOBALAMIN 1000 MCG: 1000 INJECTION, SOLUTION INTRAMUSCULAR; SUBCUTANEOUS at 11:04

## 2023-04-20 RX ADMIN — METHYLPREDNISOLONE ACETATE 160 MG: 80 INJECTION, SUSPENSION INTRA-ARTICULAR; INTRALESIONAL; INTRAMUSCULAR; SOFT TISSUE at 11:04

## 2023-04-20 RX ADMIN — KETOROLAC TROMETHAMINE 60 MG: 30 INJECTION, SOLUTION INTRAMUSCULAR; INTRAVENOUS at 11:04

## 2023-04-20 ASSESSMENT — ROUTINE ASSESSMENT OF PATIENT INDEX DATA (RAPID3)
FATIGUE SCORE: 1.1
PAIN SCORE: 3.5
TOTAL RAPID3 SCORE: 3.05
PSYCHOLOGICAL DISTRESS SCORE: 3.3
MDHAQ FUNCTION SCORE: 0.8
PATIENT GLOBAL ASSESSMENT SCORE: 3

## 2023-04-20 NOTE — PROGRESS NOTES
Subjective:      Patient ID: Osiris Billingsley is a 52 y.o. female.    Chief Complaint: Disease Management    Follow up: 52 year old female who presents to clinic for follow up on rheumatoid arthritis, psoriatic arthritis, and UC. DC Cholesterol with  on Humira she started ozempic but now DC She continues to have joint pain and tendonitis involving her hands, knees, and elbows. Shwell controlled 1 year ago. A1c 5.7% and started on ozempic and having mild nausea.     She has chronic constipation and would like to resume linzess.    Tizanidine helps with neck/headache pain.    She is waiting diagnostic mammogram/US for abnormal screening mammogram.    Current treatment:  1.Humira  2. Norco    Prior treatment:  1. Humira  2. Enbrel  3. xeljanz      Review of Systems   Constitutional:  Positive for fatigue. Negative for activity change, appetite change, chills, diaphoresis, fever and unexpected weight change.   HENT:  Negative for congestion, dental problem, ear discharge, ear pain, facial swelling, mouth sores, nosebleeds, postnasal drip, rhinorrhea, sinus pressure, sneezing, sore throat, tinnitus, trouble swallowing and voice change.    Eyes:  Negative for photophobia, pain, discharge, redness and itching.   Respiratory:  Positive for cough. Negative for apnea, chest tightness, shortness of breath and wheezing.    Cardiovascular:  Positive for leg swelling. Negative for chest pain and palpitations.   Gastrointestinal:  Positive for abdominal pain. Negative for abdominal distention, constipation, diarrhea, nausea and vomiting.   Endocrine: Negative for cold intolerance, heat intolerance, polydipsia and polyuria.   Genitourinary:  Negative for decreased urine volume, difficulty urinating, dysuria, flank pain, frequency, hematuria and urgency.   Musculoskeletal:  Positive for arthralgias, back pain, gait problem, joint swelling, myalgias, neck pain and neck stiffness.   Skin:  Negative for pallor, rash and wound.  "  Allergic/Immunologic: Negative for immunocompromised state.   Neurological:  Negative for dizziness, tremors, numbness and headaches.   Hematological:  Negative for adenopathy. Does not bruise/bleed easily.   Psychiatric/Behavioral:  Negative for sleep disturbance. The patient is not nervous/anxious.       Objective:   /82   Pulse 80   Ht 5' 2.99" (1.6 m)   Wt 56.7 kg (125 lb)   BMI 22.15 kg/m²   Physical Exam   Constitutional: She is oriented to person, place, and time. She appears distressed.   HENT:   Head: Normocephalic and atraumatic.   Eyes: Pupils are equal, round, and reactive to light. Right eye exhibits no discharge. Left eye exhibits no discharge.   Neck: No thyromegaly present.   Cardiovascular: Normal rate, regular rhythm and normal heart sounds. Exam reveals no gallop and no friction rub.   No murmur heard.  Pulmonary/Chest: Breath sounds normal. She has no wheezes. She has no rales. She exhibits no tenderness.   Abdominal: There is no abdominal tenderness. There is no rebound and no guarding.   Musculoskeletal:         General: Tenderness present.      Right shoulder: Tenderness present.      Left shoulder: Tenderness present.      Right elbow: Normal.      Left elbow: Tenderness present.      Right wrist: Tenderness present.      Left wrist: Tenderness present.      Cervical back: Neck supple.      Right knee: Effusion present. Tenderness present.      Left knee: Effusion present. Tenderness present.   Lymphadenopathy:     She has no cervical adenopathy.   Neurological: She is alert and oriented to person, place, and time. Gait normal.   Skin: Skin is dry. No rash noted. No erythema. There is pallor.   Psychiatric: Mood and affect normal.   Vitals reviewed.      Right Side Rheumatological Exam     Examination finds the elbow normal.    The patient is tender to palpation of the shoulder, wrist, knee, 1st PIP, 1st MCP, 2nd PIP, 2nd MCP, 3rd PIP, 3rd MCP, 4th PIP, 4th MCP, 5th PIP and 5th " MCP    She has swelling of the 1st PIP, 1st MCP, 2nd PIP, 2nd MCP, 3rd PIP, 3rd MCP, 4th PIP, 4th MCP, 5th PIP and 5th MCP    Shoulder Exam   Tenderness Location: no tenderness    Range of Motion   Active abduction:  abnormal   Adduction: abnormal  Sensation: normal    Knee Exam   Patellofemoral Crepitus: positive  Effusion: positive  Sensation: normal    Hip Exam   Tenderness Location: posterior  Sensation: normal    Elbow/Wrist Exam   Tenderness Location: no tenderness  Sensation: normal    Left Side Rheumatological Exam     The patient is tender to palpation of the shoulder, elbow, wrist, knee, 1st PIP, 1st MCP, 2nd PIP, 2nd MCP, 3rd PIP, 3rd MCP, 4th PIP, 4th MCP, 5th PIP and 5th MCP.    She has swelling of the 1st PIP, 1st MCP, 2nd PIP, 2nd MCP, 3rd PIP, 3rd MCP, 4th PIP, 4th MCP, 5th PIP and 5th MCP    Shoulder Exam   Tenderness Location: no tenderness    Range of Motion   Active abduction:  abnormal   Sensation: normal    Knee Exam     Patellofemoral Crepitus: positive  Effusion: positive  Sensation: normal    Hip Exam   Tenderness Location: posterior  Sensation: normal    Elbow/Wrist Exam   Sensation: normal      Back/Neck Exam   General Inspection   Gait: normal       Results for orders placed or performed in visit on 03/24/22   CBC Auto Differential   Result Value Ref Range    WBC 7.09 3.90 - 12.70 K/uL    RBC 4.50 4.00 - 5.40 M/uL    Hemoglobin 13.9 12.0 - 16.0 g/dL    Hematocrit 43.0 37.0 - 48.5 %    MCV 96 82 - 98 fL    MCH 30.9 27.0 - 31.0 pg    MCHC 32.3 32.0 - 36.0 g/dL    RDW 13.0 11.5 - 14.5 %    Platelets 296 150 - 450 K/uL    MPV 11.7 9.2 - 12.9 fL    Immature Granulocytes 0.3 0.0 - 0.5 %    Gran # (ANC) 4.7 1.8 - 7.7 K/uL    Immature Grans (Abs) 0.02 0.00 - 0.04 K/uL    Lymph # 1.7 1.0 - 4.8 K/uL    Mono # 0.4 0.3 - 1.0 K/uL    Eos # 0.2 0.0 - 0.5 K/uL    Baso # 0.04 0.00 - 0.20 K/uL    nRBC 0 0 /100 WBC    Gran % 66.8 38.0 - 73.0 %    Lymph % 23.3 18.0 - 48.0 %    Mono % 5.6 4.0 - 15.0 %     Eosinophil % 3.4 0.0 - 8.0 %    Basophil % 0.6 0.0 - 1.9 %    Differential Method Automated    Comprehensive Metabolic Panel   Result Value Ref Range    Sodium 141 136 - 145 mmol/L    Potassium 4.8 3.5 - 5.1 mmol/L    Chloride 104 95 - 110 mmol/L    CO2 28 23 - 29 mmol/L    Glucose 94 70 - 110 mg/dL    BUN 17 6 - 20 mg/dL    Creatinine 0.8 0.5 - 1.4 mg/dL    Calcium 10.0 8.7 - 10.5 mg/dL    Total Protein 7.5 6.0 - 8.4 g/dL    Albumin 4.0 3.5 - 5.2 g/dL    Total Bilirubin 0.7 0.1 - 1.0 mg/dL    Alkaline Phosphatase 59 55 - 135 U/L    AST 15 10 - 40 U/L    ALT 18 10 - 44 U/L    Anion Gap 9 8 - 16 mmol/L    eGFR if African American >60.0 >60 mL/min/1.73 m^2    eGFR if non African American >60.0 >60 mL/min/1.73 m^2   C-Reactive Protein   Result Value Ref Range    CRP 0.4 0.0 - 8.2 mg/L   Sedimentation rate   Result Value Ref Range    Sed Rate 11 0 - 36 mm/Hr     reviewed labs with patient during this visit        Assessment:     1. 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 Psoriatic arthritis    3. Seronegative rheumatoid arthritis    4. Immunocompromised state due to drug therapy    5. Chronic pain syndrome    6. Intestinal malabsorption, unspecified type    7. Anterior neck pain          Plan:     Osiris was seen today for disease management.    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  -     Hepatitis C Antibody; Future  -     Quantiferon Gold TB; Future  -     Hepatitis B Surface Antigen; Future  -     Hepatitis B Core Antibody, Total; Future  -     Hepatitis B Surface Ab, Qualitative; Future  -     Sedimentation rate; Future  -     C-Reactive Protein; Future  -     Comprehensive Metabolic Panel;  Future  -     CBC Auto Differential; Future  -     HLA B27 Antigen; Future  -     Hepatitis C Antibody  -     Quantiferon Gold TB  -     Hepatitis B Surface Antigen  -     Hepatitis B Core Antibody, Total  -     Hepatitis B Surface Ab, Qualitative  -     Sedimentation rate  -     C-Reactive Protein  -     Comprehensive Metabolic Panel  -     CBC Auto Differential  -     HLA B27 Antigen    Psoriatic arthritis  -     Hepatitis C Antibody; Future  -     Quantiferon Gold TB; Future  -     Hepatitis B Surface Antigen; Future  -     Hepatitis B Core Antibody, Total; Future  -     Hepatitis B Surface Ab, Qualitative; Future  -     Sedimentation rate; Future  -     C-Reactive Protein; Future  -     Comprehensive Metabolic Panel; Future  -     CBC Auto Differential; Future  -     HLA B27 Antigen; Future  -     Hepatitis C Antibody  -     Quantiferon Gold TB  -     Hepatitis B Surface Antigen  -     Hepatitis B Core Antibody, Total  -     Hepatitis B Surface Ab, Qualitative  -     Sedimentation rate  -     C-Reactive Protein  -     Comprehensive Metabolic Panel  -     CBC Auto Differential  -     HLA B27 Antigen  -     ketorolac injection 60 mg  -     methylPREDNISolone acetate injection 160 mg  -     cyanocobalamin injection 1,000 mcg    Seronegative rheumatoid arthritis  -     Hepatitis C Antibody; Future  -     Quantiferon Gold TB; Future  -     Hepatitis B Surface Antigen; Future  -     Hepatitis B Core Antibody, Total; Future  -     Hepatitis B Surface Ab, Qualitative; Future  -     Sedimentation rate; Future  -     C-Reactive Protein; Future  -     Comprehensive Metabolic Panel; Future  -     CBC Auto Differential; Future  -     HLA B27 Antigen; Future  -     Hepatitis C Antibody  -     Quantiferon Gold TB  -     Hepatitis B Surface Antigen  -     Hepatitis B Core Antibody, Total  -     Hepatitis B Surface Ab, Qualitative  -     Sedimentation rate  -     C-Reactive Protein  -     Comprehensive Metabolic Panel  -     CBC  Auto Differential  -     HLA B27 Antigen  -     ketorolac injection 60 mg  -     methylPREDNISolone acetate injection 160 mg  -     cyanocobalamin injection 1,000 mcg    Immunocompromised state due to drug therapy  -     Hepatitis C Antibody; Future  -     Quantiferon Gold TB; Future  -     Hepatitis B Surface Antigen; Future  -     Hepatitis B Core Antibody, Total; Future  -     Hepatitis B Surface Ab, Qualitative; Future  -     Sedimentation rate; Future  -     C-Reactive Protein; Future  -     Comprehensive Metabolic Panel; Future  -     CBC Auto Differential; Future  -     HLA B27 Antigen; Future  -     Hepatitis C Antibody  -     Quantiferon Gold TB  -     Hepatitis B Surface Antigen  -     Hepatitis B Core Antibody, Total  -     Hepatitis B Surface Ab, Qualitative  -     Sedimentation rate  -     C-Reactive Protein  -     Comprehensive Metabolic Panel  -     CBC Auto Differential  -     HLA B27 Antigen  -     ketorolac injection 60 mg  -     methylPREDNISolone acetate injection 160 mg  -     cyanocobalamin injection 1,000 mcg    Chronic pain syndrome  -     Hepatitis C Antibody; Future  -     Quantiferon Gold TB; Future  -     Hepatitis B Surface Antigen; Future  -     Hepatitis B Core Antibody, Total; Future  -     Hepatitis B Surface Ab, Qualitative; Future  -     Sedimentation rate; Future  -     C-Reactive Protein; Future  -     Comprehensive Metabolic Panel; Future  -     CBC Auto Differential; Future  -     HLA B27 Antigen; Future  -     Hepatitis C Antibody  -     Quantiferon Gold TB  -     Hepatitis B Surface Antigen  -     Hepatitis B Core Antibody, Total  -     Hepatitis B Surface Ab, Qualitative  -     Sedimentation rate  -     C-Reactive Protein  -     Comprehensive Metabolic Panel  -     CBC Auto Differential  -     HLA B27 Antigen  -     ketorolac injection 60 mg  -     methylPREDNISolone acetate injection 160 mg  -     cyanocobalamin injection 1,000 mcg    Intestinal malabsorption, unspecified  type  -     Hepatitis C Antibody; Future  -     Quantiferon Gold TB; Future  -     Hepatitis B Surface Antigen; Future  -     Hepatitis B Core Antibody, Total; Future  -     Hepatitis B Surface Ab, Qualitative; Future  -     Sedimentation rate; Future  -     C-Reactive Protein; Future  -     Comprehensive Metabolic Panel; Future  -     CBC Auto Differential; Future  -     HLA B27 Antigen; Future  -     Hepatitis C Antibody  -     Quantiferon Gold TB  -     Hepatitis B Surface Antigen  -     Hepatitis B Core Antibody, Total  -     Hepatitis B Surface Ab, Qualitative  -     Sedimentation rate  -     C-Reactive Protein  -     Comprehensive Metabolic Panel  -     CBC Auto Differential  -     HLA B27 Antigen  -     ketorolac injection 60 mg  -     methylPREDNISolone acetate injection 160 mg  -     cyanocobalamin injection 1,000 mcg    Anterior neck pain  -     Hepatitis C Antibody; Future  -     Quantiferon Gold TB; Future  -     Hepatitis B Surface Antigen; Future  -     Hepatitis B Core Antibody, Total; Future  -     Hepatitis B Surface Ab, Qualitative; Future  -     Sedimentation rate; Future  -     C-Reactive Protein; Future  -     Comprehensive Metabolic Panel; Future  -     CBC Auto Differential; Future  -     HLA B27 Antigen; Future  -     Hepatitis C Antibody  -     Quantiferon Gold TB  -     Hepatitis B Surface Antigen  -     Hepatitis B Core Antibody, Total  -     Hepatitis B Surface Ab, Qualitative  -     Sedimentation rate  -     C-Reactive Protein  -     Comprehensive Metabolic Panel  -     CBC Auto Differential  -     HLA B27 Antigen  -     ketorolac injection 60 mg  -     methylPREDNISolone acetate injection 160 mg  -     cyanocobalamin injection 1,000 mcg       More than 50% of the  40 minute encounter was spent face to face counseling the patient regarding current status and future plan of care as well as side effects  of the medications. All questions were answered to patient's satisfaction also  includes  non-face to face time preparing to see the patient (eg, review of tests), Obtaining and/or reviewing separately obtained history, Documenting clinical information in the electronic or other health record, Independently interpreting results

## 2023-04-23 ENCOUNTER — PATIENT MESSAGE (OUTPATIENT)
Dept: RHEUMATOLOGY | Facility: CLINIC | Age: 53
End: 2023-04-23
Payer: COMMERCIAL

## 2023-05-15 DIAGNOSIS — L40.50 PSORIATIC ARTHRITIS: ICD-10-CM

## 2023-05-15 DIAGNOSIS — M54.2 NECK PAIN: ICD-10-CM

## 2023-05-15 RX ORDER — TIZANIDINE 4 MG/1
4 TABLET ORAL EVERY 8 HOURS PRN
Qty: 90 TABLET | Refills: 3 | Status: SHIPPED | OUTPATIENT
Start: 2023-05-15 | End: 2024-02-16 | Stop reason: SDUPTHER

## 2023-05-16 DIAGNOSIS — M06.00 SERONEGATIVE RHEUMATOID ARTHRITIS: ICD-10-CM

## 2023-05-16 DIAGNOSIS — L40.50 PSORIATIC ARTHRITIS: ICD-10-CM

## 2023-05-16 RX ORDER — ADALIMUMAB 40MG/0.4ML
40 KIT SUBCUTANEOUS
Qty: 2 PEN | Refills: 11 | Status: ACTIVE | OUTPATIENT
Start: 2023-05-16 | End: 2023-08-08

## 2023-05-22 ENCOUNTER — TELEPHONE (OUTPATIENT)
Dept: PHARMACY | Facility: CLINIC | Age: 53
End: 2023-05-22
Payer: COMMERCIAL

## 2023-05-22 NOTE — TELEPHONE ENCOUNTER
Demetrius, this is Ellen Farrar, clinical pharmacist with [facility] that is part of your care team.  We have begun working on your prescription that your doctor has sent us for Humira. Our next steps include:     Working with your insurance company to obtain approval for your medication  Working with you to ensure your medication is affordable     We will be calling you along the way with updates on your medication but if you have any concerns or receive information that you would like to discuss please reach us at [phone].    Welcome call outcome: Left voicemail

## 2023-06-05 ENCOUNTER — LAB VISIT (OUTPATIENT)
Dept: LAB | Facility: HOSPITAL | Age: 53
End: 2023-06-05
Attending: INTERNAL MEDICINE
Payer: COMMERCIAL

## 2023-06-05 DIAGNOSIS — M06.00 SERONEGATIVE RHEUMATOID ARTHRITIS: ICD-10-CM

## 2023-06-05 DIAGNOSIS — M54.2 NECK PAIN: ICD-10-CM

## 2023-06-05 DIAGNOSIS — L40.50 PSORIATIC ARTHRITIS: ICD-10-CM

## 2023-06-05 DIAGNOSIS — G89.4 CHRONIC PAIN SYNDROME: ICD-10-CM

## 2023-06-05 DIAGNOSIS — Z79.899 IMMUNOCOMPROMISED STATE DUE TO DRUG THERAPY: ICD-10-CM

## 2023-06-05 DIAGNOSIS — D84.821 IMMUNOCOMPROMISED STATE DUE TO DRUG THERAPY: ICD-10-CM

## 2023-06-05 LAB
25(OH)D3+25(OH)D2 SERPL-MCNC: 38 NG/ML (ref 30–96)
CCP AB SER IA-ACNC: <0.5 U/ML

## 2023-06-05 PROCEDURE — 86200 CCP ANTIBODY: CPT | Performed by: INTERNAL MEDICINE

## 2023-06-05 PROCEDURE — 82306 VITAMIN D 25 HYDROXY: CPT | Performed by: INTERNAL MEDICINE

## 2023-06-05 PROCEDURE — 86431 RHEUMATOID FACTOR QUANT: CPT | Performed by: INTERNAL MEDICINE

## 2023-06-05 PROCEDURE — 36415 COLL VENOUS BLD VENIPUNCTURE: CPT | Mod: PO | Performed by: INTERNAL MEDICINE

## 2023-06-05 PROCEDURE — 86038 ANTINUCLEAR ANTIBODIES: CPT | Performed by: INTERNAL MEDICINE

## 2023-06-06 LAB
ANA SER QL IF: NORMAL
RHEUMATOID FACT SERPL-ACNC: <13 IU/ML (ref 0–15)

## 2023-06-14 ENCOUNTER — PATIENT MESSAGE (OUTPATIENT)
Dept: RHEUMATOLOGY | Facility: CLINIC | Age: 53
End: 2023-06-14
Payer: COMMERCIAL

## 2023-06-20 ENCOUNTER — SPECIALTY PHARMACY (OUTPATIENT)
Dept: PHARMACY | Facility: CLINIC | Age: 53
End: 2023-06-20
Payer: COMMERCIAL

## 2023-06-20 NOTE — TELEPHONE ENCOUNTER
Morgan MIRANDA approved from 6/19/2023 - 6/18/2024. EOC#: [id number]. [facility] is NOT in network. Pt locked into [facility]. Rx routed. Receipt confirmed by pharmacy (6/20/2023  9:23 AM CDT).     Reached pt and inform her of Humira PA approval. She is aware to contact [facility] for refill.

## 2023-07-19 DIAGNOSIS — G47.00 INSOMNIA, UNSPECIFIED TYPE: ICD-10-CM

## 2023-07-20 RX ORDER — TRAZODONE HYDROCHLORIDE 100 MG/1
TABLET ORAL
Qty: 90 TABLET | Refills: 3 | Status: SHIPPED | OUTPATIENT
Start: 2023-07-20

## 2023-08-07 DIAGNOSIS — F32.9 MAJOR DEPRESSION, CHRONIC: ICD-10-CM

## 2023-08-07 RX ORDER — ESCITALOPRAM OXALATE 20 MG/1
20 TABLET ORAL DAILY
Qty: 90 TABLET | Refills: 3 | Status: SHIPPED | OUTPATIENT
Start: 2023-08-07 | End: 2024-08-06

## 2023-08-07 NOTE — TELEPHONE ENCOUNTER
Pharmacy requesting refill on Escitalopram 20mg  Pt's LOV 04/20/2023  Pt's NOV 09/01/2023  Medication pending

## 2023-08-14 ENCOUNTER — PATIENT MESSAGE (OUTPATIENT)
Dept: RHEUMATOLOGY | Facility: CLINIC | Age: 53
End: 2023-08-14
Payer: COMMERCIAL

## 2023-08-25 ENCOUNTER — PATIENT MESSAGE (OUTPATIENT)
Dept: RHEUMATOLOGY | Facility: CLINIC | Age: 53
End: 2023-08-25
Payer: COMMERCIAL

## 2023-10-03 ENCOUNTER — OFFICE VISIT (OUTPATIENT)
Dept: RHEUMATOLOGY | Facility: CLINIC | Age: 53
End: 2023-10-03
Payer: COMMERCIAL

## 2023-10-03 VITALS
HEIGHT: 62 IN | SYSTOLIC BLOOD PRESSURE: 129 MMHG | DIASTOLIC BLOOD PRESSURE: 69 MMHG | WEIGHT: 130.06 LBS | BODY MASS INDEX: 23.93 KG/M2 | HEART RATE: 73 BPM

## 2023-10-03 DIAGNOSIS — L40.50 PSORIATIC ARTHRITIS: ICD-10-CM

## 2023-10-03 DIAGNOSIS — G47.00 INSOMNIA, UNSPECIFIED TYPE: ICD-10-CM

## 2023-10-03 DIAGNOSIS — M06.00 SERONEGATIVE RHEUMATOID ARTHRITIS: ICD-10-CM

## 2023-10-03 DIAGNOSIS — G89.4 CHRONIC PAIN SYNDROME: ICD-10-CM

## 2023-10-03 DIAGNOSIS — M06.00 SERONEGATIVE RHEUMATOID ARTHRITIS: Primary | ICD-10-CM

## 2023-10-03 DIAGNOSIS — K90.9 INTESTINAL MALABSORPTION, UNSPECIFIED TYPE: ICD-10-CM

## 2023-10-03 DIAGNOSIS — K58.1 IRRITABLE BOWEL SYNDROME WITH CONSTIPATION: ICD-10-CM

## 2023-10-03 DIAGNOSIS — D84.821 IMMUNOCOMPROMISED STATE DUE TO DRUG THERAPY: ICD-10-CM

## 2023-10-03 DIAGNOSIS — Z79.899 IMMUNOCOMPROMISED STATE DUE TO DRUG THERAPY: ICD-10-CM

## 2023-10-03 DIAGNOSIS — E09.69 DRUG OR CHEMICAL INDUCED DIABETES MELLITUS WITH OTHER SPECIFIED COMPLICATION, UNSPECIFIED WHETHER LONG TERM INSULIN USE: ICD-10-CM

## 2023-10-03 PROCEDURE — 3074F SYST BP LT 130 MM HG: CPT | Mod: CPTII,S$GLB,, | Performed by: PHYSICIAN ASSISTANT

## 2023-10-03 PROCEDURE — 99214 PR OFFICE/OUTPT VISIT, EST, LEVL IV, 30-39 MIN: ICD-10-PCS | Mod: 25,S$GLB,, | Performed by: PHYSICIAN ASSISTANT

## 2023-10-03 PROCEDURE — 3008F BODY MASS INDEX DOCD: CPT | Mod: CPTII,S$GLB,, | Performed by: PHYSICIAN ASSISTANT

## 2023-10-03 PROCEDURE — 96372 THER/PROPH/DIAG INJ SC/IM: CPT | Mod: S$GLB,,, | Performed by: PHYSICIAN ASSISTANT

## 2023-10-03 PROCEDURE — 1159F MED LIST DOCD IN RCRD: CPT | Mod: CPTII,S$GLB,, | Performed by: PHYSICIAN ASSISTANT

## 2023-10-03 PROCEDURE — 3078F PR MOST RECENT DIASTOLIC BLOOD PRESSURE < 80 MM HG: ICD-10-PCS | Mod: CPTII,S$GLB,, | Performed by: PHYSICIAN ASSISTANT

## 2023-10-03 PROCEDURE — 96372 PR INJECTION,THERAP/PROPH/DIAG2ST, IM OR SUBCUT: ICD-10-PCS | Mod: S$GLB,,, | Performed by: PHYSICIAN ASSISTANT

## 2023-10-03 PROCEDURE — 99999 PR PBB SHADOW E&M-EST. PATIENT-LVL III: CPT | Mod: PBBFAC,,, | Performed by: PHYSICIAN ASSISTANT

## 2023-10-03 PROCEDURE — 3074F PR MOST RECENT SYSTOLIC BLOOD PRESSURE < 130 MM HG: ICD-10-PCS | Mod: CPTII,S$GLB,, | Performed by: PHYSICIAN ASSISTANT

## 2023-10-03 PROCEDURE — 99999 PR PBB SHADOW E&M-EST. PATIENT-LVL III: ICD-10-PCS | Mod: PBBFAC,,, | Performed by: PHYSICIAN ASSISTANT

## 2023-10-03 PROCEDURE — 99214 OFFICE O/P EST MOD 30 MIN: CPT | Mod: 25,S$GLB,, | Performed by: PHYSICIAN ASSISTANT

## 2023-10-03 PROCEDURE — 1159F PR MEDICATION LIST DOCUMENTED IN MEDICAL RECORD: ICD-10-PCS | Mod: CPTII,S$GLB,, | Performed by: PHYSICIAN ASSISTANT

## 2023-10-03 PROCEDURE — 3008F PR BODY MASS INDEX (BMI) DOCUMENTED: ICD-10-PCS | Mod: CPTII,S$GLB,, | Performed by: PHYSICIAN ASSISTANT

## 2023-10-03 PROCEDURE — 3078F DIAST BP <80 MM HG: CPT | Mod: CPTII,S$GLB,, | Performed by: PHYSICIAN ASSISTANT

## 2023-10-03 RX ORDER — LUBIPROSTONE 8 UG/1
8 CAPSULE ORAL 2 TIMES DAILY WITH MEALS
Qty: 60 CAPSULE | Refills: 5 | Status: SHIPPED | OUTPATIENT
Start: 2023-10-03

## 2023-10-03 RX ORDER — KETOROLAC TROMETHAMINE 30 MG/ML
60 INJECTION, SOLUTION INTRAMUSCULAR; INTRAVENOUS
Status: COMPLETED | OUTPATIENT
Start: 2023-10-03 | End: 2023-10-03

## 2023-10-03 RX ORDER — METHYLPREDNISOLONE ACETATE 80 MG/ML
160 INJECTION, SUSPENSION INTRA-ARTICULAR; INTRALESIONAL; INTRAMUSCULAR; SOFT TISSUE
Status: COMPLETED | OUTPATIENT
Start: 2023-10-03 | End: 2023-10-03

## 2023-10-03 RX ORDER — IBUPROFEN 800 MG/1
800 TABLET ORAL 3 TIMES DAILY PRN
Qty: 90 TABLET | Refills: 3 | Status: SHIPPED | OUTPATIENT
Start: 2023-10-03

## 2023-10-03 RX ORDER — CYANOCOBALAMIN 1000 UG/ML
1000 INJECTION, SOLUTION INTRAMUSCULAR; SUBCUTANEOUS
Status: COMPLETED | OUTPATIENT
Start: 2023-10-03 | End: 2023-10-03

## 2023-10-03 RX ADMIN — CYANOCOBALAMIN 1000 MCG: 1000 INJECTION, SOLUTION INTRAMUSCULAR; SUBCUTANEOUS at 04:10

## 2023-10-03 RX ADMIN — METHYLPREDNISOLONE ACETATE 160 MG: 80 INJECTION, SUSPENSION INTRA-ARTICULAR; INTRALESIONAL; INTRAMUSCULAR; SOFT TISSUE at 04:10

## 2023-10-03 RX ADMIN — KETOROLAC TROMETHAMINE 60 MG: 30 INJECTION, SOLUTION INTRAMUSCULAR; INTRAVENOUS at 04:10

## 2023-10-03 NOTE — PROGRESS NOTES
Subjective:       Patient ID: Osiris Billingsley is a 52 y.o. female.    Chief Complaint: Disease Management      Mrs. Billingsley is a 52 year old female who presents to clinic for follow up on rheumatoid arthritis, psoriatic arthritis, and UC. She is doing fair overall on Humira. She was off treatment for 3-4 months due to insurance authorization and she recently resumed treatment 2 months ago. She is having arthritis flare in her hands, wrists, elbows, neck, hips, knees, ankles, and feet. Pain is constant and aching and she has joint swelling. She reports eval by Ortho foot did not feel she was a surgical candidate due to extensive arthritis in her foot. She states fatigue has been worse with activity.    She is not taking ozempic routinely.     She has chronic constipation and d/jaida linzess because it was too strong causing diarrhea.     Tizanidine helps with neck/headache pain.    She is due for labs.     Current treatment:  1. Humira  2. Norco    Prior treatment:  Humira  Enbrel    Review of Systems   Constitutional: Positive for activity change. Negative for appetite change, chills, fatigue and fever.   Eyes: Negative for visual disturbance.   Respiratory: Negative for cough.    Cardiovascular: Negative for chest pain.   Gastrointestinal: Negative for abdominal pain and constipation.   Musculoskeletal: Positive for arthralgias and joint swelling.   Skin: Negative for rash.   Allergic/Immunologic: Positive for immunocompromised state.   Neurological: Positive for headaches. Negative for dizziness, weakness and light-headedness.   Psychiatric/Behavioral: Negative for anxiety.         Objective:     Vitals:    10/03/23 1502   BP: 129/69   Pulse: 73       Past Medical History:   Diagnosis Date    Colitis     Dry eyes     Dry mouth     Neuromuscular disorder     Premature menopause     Rheumatoid arthritis      Past Surgical History:   Procedure Laterality Date     SECTION            Physical Exam    Constitutional: She is well-developed, well-nourished, and in no distress.   Eyes: Right conjunctiva is not injected. Left conjunctiva is not injected.   Neck: No JVD present. No thyromegaly present.   Cardiovascular: Normal rate and regular rhythm.  Exam reveals no decreased pulses.    Pulmonary/Chest: Effort normal.       Right Side Rheumatological Exam     Examination finds the shoulder, elbow, knee, 2nd PIP, 3rd PIP, 4th PIP and 5th PIP normal.    Wrist is swollen, 2nd MCP, 3rd MCP, 4th MCP, 5th MCP.    Swelling in knees and ankles.     Left Side Rheumatological Exam     Examination finds the shoulder, elbow, knee, 2nd PIP, 3rd PIP, 4th PIP and 5th PIP normal.    Wrist is swollen, 2nd MCP, 3rd MCP, 4th MCP, 5th MCP    Swelling in ankles.     Lymphadenopathy:     She has no cervical adenopathy.   Neurological: Gait normal.   Skin: No rash noted.     Psychiatric: Affect normal.   Musculoskeletal: Tenderness and edema present.        Component      Latest Ref Rng 6/5/2023   ARLIN Screen      Negative <1:80  Negative <1:80    Rheumatoid Factor      0.0 - 15.0 IU/mL <13.0    CCP Antibodies      <5.0 U/mL <0.5    Vit D, 25-Hydroxy      30 - 96 ng/mL 38        Assessment:       1. Seronegative rheumatoid arthritis    2. Psoriatic arthritis    3. Irritable bowel syndrome with constipation    4. Insomnia, unspecified type    5. Chronic pain syndrome                Plan:       Seronegative rheumatoid arthritis  -     ibuprofen (ADVIL,MOTRIN) 800 MG tablet; Take 1 tablet (800 mg total) by mouth 3 (three) times daily as needed for Pain.  Dispense: 90 tablet; Refill: 3  -     ketorolac injection 60 mg  -     methylPREDNISolone acetate injection 160 mg  -     cyanocobalamin injection 1,000 mcg  -     CBC Auto Differential; Future; Expected date: 10/03/2023  -     Comprehensive Metabolic Panel; Future; Expected date: 10/03/2023  -     C-Reactive Protein; Future; Expected date: 10/03/2023  -     Sedimentation rate; Future;  Expected date: 10/03/2023  -     Hepatitis C Antibody; Future; Expected date: 10/03/2023  -     Quantiferon Gold TB; Future; Expected date: 10/03/2023  -     Hepatitis B Surface Antigen; Future; Expected date: 10/03/2023  -     Hepatitis B Core Antibody, Total; Future; Expected date: 10/03/2023  -     Lipid Panel; Future; Expected date: 10/03/2023    Psoriatic arthritis  -     ibuprofen (ADVIL,MOTRIN) 800 MG tablet; Take 1 tablet (800 mg total) by mouth 3 (three) times daily as needed for Pain.  Dispense: 90 tablet; Refill: 3  -     ketorolac injection 60 mg  -     methylPREDNISolone acetate injection 160 mg  -     cyanocobalamin injection 1,000 mcg  -     CBC Auto Differential; Future; Expected date: 10/03/2023  -     Comprehensive Metabolic Panel; Future; Expected date: 10/03/2023  -     C-Reactive Protein; Future; Expected date: 10/03/2023  -     Sedimentation rate; Future; Expected date: 10/03/2023  -     Hepatitis C Antibody; Future; Expected date: 10/03/2023  -     Quantiferon Gold TB; Future; Expected date: 10/03/2023  -     Hepatitis B Surface Antigen; Future; Expected date: 10/03/2023  -     Hepatitis B Core Antibody, Total; Future; Expected date: 10/03/2023  -     Lipid Panel; Future; Expected date: 10/03/2023    Irritable bowel syndrome with constipation  -     lubiprostone (AMITIZA) 8 MCG Cap; Take 1 capsule (8 mcg total) by mouth 2 (two) times daily with meals.  Dispense: 60 capsule; Refill: 5    Insomnia, unspecified type    Chronic pain syndrome            Assessment:  52 year old female with  Seronegative rheumatoid arthritis, psoriatic arthritis, psoriasis, ulcerative colitis, and uveitis  --history of ARLIN 1:160 homogenous  --IBS w/constipation  --chronic pain syndrome    Plan:  1. Toradol 60, depo 160, b12 today  2. Cont humira every 14 days  3. Cont ibuprofen prn, tizanidine prn  4. Trial of amitiza  5. Fasting labs soon      Follow up:   4 mo Dr. Devi

## 2023-10-04 RX ORDER — HYDROCODONE BITARTRATE AND ACETAMINOPHEN 7.5; 325 MG/1; MG/1
1 TABLET ORAL EVERY 8 HOURS PRN
Qty: 90 TABLET | Refills: 0 | Status: SHIPPED | OUTPATIENT
Start: 2023-11-02 | End: 2024-01-04

## 2023-10-04 RX ORDER — HYDROCODONE BITARTRATE AND ACETAMINOPHEN 7.5; 325 MG/1; MG/1
1 TABLET ORAL EVERY 8 HOURS PRN
Qty: 90 TABLET | Refills: 0 | Status: SHIPPED | OUTPATIENT
Start: 2023-10-04 | End: 2024-01-04

## 2023-10-05 ENCOUNTER — LAB VISIT (OUTPATIENT)
Dept: LAB | Facility: HOSPITAL | Age: 53
End: 2023-10-05
Attending: PHYSICIAN ASSISTANT
Payer: COMMERCIAL

## 2023-10-05 ENCOUNTER — PATIENT MESSAGE (OUTPATIENT)
Dept: RHEUMATOLOGY | Facility: CLINIC | Age: 53
End: 2023-10-05
Payer: COMMERCIAL

## 2023-10-05 DIAGNOSIS — M06.00 SERONEGATIVE RHEUMATOID ARTHRITIS: ICD-10-CM

## 2023-10-05 DIAGNOSIS — L40.50 PSORIATIC ARTHRITIS: ICD-10-CM

## 2023-10-05 LAB
ALBUMIN SERPL BCP-MCNC: 4 G/DL (ref 3.5–5.2)
ALP SERPL-CCNC: 58 U/L (ref 55–135)
ALT SERPL W/O P-5'-P-CCNC: 11 U/L (ref 10–44)
ANION GAP SERPL CALC-SCNC: 8 MMOL/L (ref 8–16)
AST SERPL-CCNC: 15 U/L (ref 10–40)
BASOPHILS # BLD AUTO: 0.04 K/UL (ref 0–0.2)
BASOPHILS NFR BLD: 0.7 % (ref 0–1.9)
BILIRUB SERPL-MCNC: 0.7 MG/DL (ref 0.1–1)
BUN SERPL-MCNC: 15 MG/DL (ref 6–20)
CALCIUM SERPL-MCNC: 9.4 MG/DL (ref 8.7–10.5)
CHLORIDE SERPL-SCNC: 106 MMOL/L (ref 95–110)
CO2 SERPL-SCNC: 25 MMOL/L (ref 23–29)
CREAT SERPL-MCNC: 0.8 MG/DL (ref 0.5–1.4)
CRP SERPL-MCNC: 0.4 MG/L (ref 0–8.2)
DIFFERENTIAL METHOD: NORMAL
EOSINOPHIL # BLD AUTO: 0.2 K/UL (ref 0–0.5)
EOSINOPHIL NFR BLD: 3.9 % (ref 0–8)
ERYTHROCYTE [DISTWIDTH] IN BLOOD BY AUTOMATED COUNT: 12.7 % (ref 11.5–14.5)
ERYTHROCYTE [SEDIMENTATION RATE] IN BLOOD BY WESTERGREN METHOD: 9 MM/HR (ref 0–20)
EST. GFR  (NO RACE VARIABLE): >60 ML/MIN/1.73 M^2
GLUCOSE SERPL-MCNC: 88 MG/DL (ref 70–110)
HBV CORE AB SERPL QL IA: NORMAL
HBV SURFACE AG SERPL QL IA: NORMAL
HCT VFR BLD AUTO: 41.2 % (ref 37–48.5)
HCV AB SERPL QL IA: NORMAL
HGB BLD-MCNC: 13.3 G/DL (ref 12–16)
IMM GRANULOCYTES # BLD AUTO: 0.01 K/UL (ref 0–0.04)
IMM GRANULOCYTES NFR BLD AUTO: 0.2 % (ref 0–0.5)
LYMPHOCYTES # BLD AUTO: 2.2 K/UL (ref 1–4.8)
LYMPHOCYTES NFR BLD: 35.9 % (ref 18–48)
MCH RBC QN AUTO: 30.1 PG (ref 27–31)
MCHC RBC AUTO-ENTMCNC: 32.3 G/DL (ref 32–36)
MCV RBC AUTO: 93 FL (ref 82–98)
MONOCYTES # BLD AUTO: 0.4 K/UL (ref 0.3–1)
MONOCYTES NFR BLD: 6.6 % (ref 4–15)
NEUTROPHILS # BLD AUTO: 3.2 K/UL (ref 1.8–7.7)
NEUTROPHILS NFR BLD: 52.7 % (ref 38–73)
NRBC BLD-RTO: 0 /100 WBC
PLATELET # BLD AUTO: 241 K/UL (ref 150–450)
PMV BLD AUTO: 11.8 FL (ref 9.2–12.9)
POTASSIUM SERPL-SCNC: 4.6 MMOL/L (ref 3.5–5.1)
PROT SERPL-MCNC: 7.2 G/DL (ref 6–8.4)
RBC # BLD AUTO: 4.42 M/UL (ref 4–5.4)
SODIUM SERPL-SCNC: 139 MMOL/L (ref 136–145)
WBC # BLD AUTO: 6.1 K/UL (ref 3.9–12.7)

## 2023-10-05 PROCEDURE — 86803 HEPATITIS C AB TEST: CPT | Performed by: PHYSICIAN ASSISTANT

## 2023-10-05 PROCEDURE — 86480 TB TEST CELL IMMUN MEASURE: CPT | Performed by: PHYSICIAN ASSISTANT

## 2023-10-05 PROCEDURE — 85651 RBC SED RATE NONAUTOMATED: CPT | Mod: PO | Performed by: PHYSICIAN ASSISTANT

## 2023-10-05 PROCEDURE — 87340 HEPATITIS B SURFACE AG IA: CPT | Performed by: PHYSICIAN ASSISTANT

## 2023-10-05 PROCEDURE — 85025 COMPLETE CBC W/AUTO DIFF WBC: CPT | Performed by: PHYSICIAN ASSISTANT

## 2023-10-05 PROCEDURE — 80053 COMPREHEN METABOLIC PANEL: CPT | Performed by: PHYSICIAN ASSISTANT

## 2023-10-05 PROCEDURE — 36415 COLL VENOUS BLD VENIPUNCTURE: CPT | Mod: PO | Performed by: PHYSICIAN ASSISTANT

## 2023-10-05 PROCEDURE — 86704 HEP B CORE ANTIBODY TOTAL: CPT | Performed by: PHYSICIAN ASSISTANT

## 2023-10-05 PROCEDURE — 86140 C-REACTIVE PROTEIN: CPT | Performed by: PHYSICIAN ASSISTANT

## 2023-10-06 LAB
GAMMA INTERFERON BACKGROUND BLD IA-ACNC: 0.02 IU/ML
M TB IFN-G CD4+ BCKGRND COR BLD-ACNC: 0.02 IU/ML
M TB IFN-G CD4+ BCKGRND COR BLD-ACNC: 0.04 IU/ML
MITOGEN IGNF BCKGRD COR BLD-ACNC: 1.78 IU/ML
TB GOLD PLUS: NEGATIVE

## 2023-10-20 ENCOUNTER — PATIENT MESSAGE (OUTPATIENT)
Dept: RHEUMATOLOGY | Facility: CLINIC | Age: 53
End: 2023-10-20
Payer: COMMERCIAL

## 2023-12-30 ENCOUNTER — PATIENT MESSAGE (OUTPATIENT)
Dept: RHEUMATOLOGY | Facility: CLINIC | Age: 53
End: 2023-12-30
Payer: COMMERCIAL

## 2024-01-03 ENCOUNTER — PATIENT MESSAGE (OUTPATIENT)
Dept: RHEUMATOLOGY | Facility: CLINIC | Age: 54
End: 2024-01-03
Payer: COMMERCIAL

## 2024-01-03 DIAGNOSIS — D84.821 IMMUNOCOMPROMISED STATE DUE TO DRUG THERAPY: ICD-10-CM

## 2024-01-03 DIAGNOSIS — K90.9 INTESTINAL MALABSORPTION, UNSPECIFIED TYPE: ICD-10-CM

## 2024-01-03 DIAGNOSIS — M06.00 SERONEGATIVE RHEUMATOID ARTHRITIS: ICD-10-CM

## 2024-01-03 DIAGNOSIS — G89.4 CHRONIC PAIN SYNDROME: ICD-10-CM

## 2024-01-03 DIAGNOSIS — B02.9 HERPES ZOSTER WITHOUT COMPLICATION: ICD-10-CM

## 2024-01-03 DIAGNOSIS — E09.69 DRUG OR CHEMICAL INDUCED DIABETES MELLITUS WITH OTHER SPECIFIED COMPLICATION, UNSPECIFIED WHETHER LONG TERM INSULIN USE: ICD-10-CM

## 2024-01-03 DIAGNOSIS — L40.50 PSORIATIC ARTHRITIS: ICD-10-CM

## 2024-01-03 DIAGNOSIS — Z79.899 IMMUNOCOMPROMISED STATE DUE TO DRUG THERAPY: ICD-10-CM

## 2024-01-03 RX ORDER — VALACYCLOVIR HYDROCHLORIDE 1 G/1
TABLET, FILM COATED ORAL
Qty: 60 TABLET | Refills: 0 | Status: CANCELLED | OUTPATIENT
Start: 2024-01-03

## 2024-01-03 NOTE — TELEPHONE ENCOUNTER
Pharmacy requesting refill on Valacyclovir 1gm  Pt's LOV 10/03/2023  Pt's NOV 01/04/2024  Medication pending

## 2024-01-04 ENCOUNTER — OFFICE VISIT (OUTPATIENT)
Dept: RHEUMATOLOGY | Facility: CLINIC | Age: 54
End: 2024-01-04
Payer: COMMERCIAL

## 2024-01-04 VITALS
WEIGHT: 149.94 LBS | DIASTOLIC BLOOD PRESSURE: 78 MMHG | SYSTOLIC BLOOD PRESSURE: 125 MMHG | BODY MASS INDEX: 27.59 KG/M2 | HEART RATE: 78 BPM | HEIGHT: 62 IN

## 2024-01-04 DIAGNOSIS — E66.3 OVERWEIGHT WITH BODY MASS INDEX (BMI) OF 27 TO 27.9 IN ADULT: ICD-10-CM

## 2024-01-04 DIAGNOSIS — E78.5 HYPERLIPIDEMIA, UNSPECIFIED HYPERLIPIDEMIA TYPE: ICD-10-CM

## 2024-01-04 DIAGNOSIS — M06.00 SERONEGATIVE RHEUMATOID ARTHRITIS: ICD-10-CM

## 2024-01-04 DIAGNOSIS — L40.50 PSORIATIC ARTHRITIS: Primary | ICD-10-CM

## 2024-01-04 DIAGNOSIS — G89.4 CHRONIC PAIN SYNDROME: ICD-10-CM

## 2024-01-04 DIAGNOSIS — Z79.899 IMMUNOCOMPROMISED STATE DUE TO DRUG THERAPY: ICD-10-CM

## 2024-01-04 DIAGNOSIS — D84.821 IMMUNOCOMPROMISED STATE DUE TO DRUG THERAPY: ICD-10-CM

## 2024-01-04 DIAGNOSIS — B02.9 HERPES ZOSTER WITHOUT COMPLICATION: ICD-10-CM

## 2024-01-04 PROCEDURE — 99214 OFFICE O/P EST MOD 30 MIN: CPT | Mod: 25,S$GLB,, | Performed by: PHYSICIAN ASSISTANT

## 2024-01-04 PROCEDURE — 3008F BODY MASS INDEX DOCD: CPT | Mod: CPTII,S$GLB,, | Performed by: PHYSICIAN ASSISTANT

## 2024-01-04 PROCEDURE — 1160F RVW MEDS BY RX/DR IN RCRD: CPT | Mod: CPTII,S$GLB,, | Performed by: PHYSICIAN ASSISTANT

## 2024-01-04 PROCEDURE — 3074F SYST BP LT 130 MM HG: CPT | Mod: CPTII,S$GLB,, | Performed by: PHYSICIAN ASSISTANT

## 2024-01-04 PROCEDURE — 99999 PR PBB SHADOW E&M-EST. PATIENT-LVL V: CPT | Mod: PBBFAC,,, | Performed by: PHYSICIAN ASSISTANT

## 2024-01-04 PROCEDURE — 3078F DIAST BP <80 MM HG: CPT | Mod: CPTII,S$GLB,, | Performed by: PHYSICIAN ASSISTANT

## 2024-01-04 PROCEDURE — 96372 THER/PROPH/DIAG INJ SC/IM: CPT | Mod: S$GLB,,, | Performed by: PHYSICIAN ASSISTANT

## 2024-01-04 PROCEDURE — 1159F MED LIST DOCD IN RCRD: CPT | Mod: CPTII,S$GLB,, | Performed by: PHYSICIAN ASSISTANT

## 2024-01-04 RX ORDER — VALACYCLOVIR HYDROCHLORIDE 1 G/1
1000 TABLET, FILM COATED ORAL EVERY 12 HOURS
Qty: 60 TABLET | Refills: 1 | Status: SHIPPED | OUTPATIENT
Start: 2024-01-04

## 2024-01-04 RX ORDER — CYANOCOBALAMIN 1000 UG/ML
1000 INJECTION, SOLUTION INTRAMUSCULAR; SUBCUTANEOUS
Status: COMPLETED | OUTPATIENT
Start: 2024-01-04 | End: 2024-01-04

## 2024-01-04 RX ORDER — OMEPRAZOLE 40 MG/1
40 CAPSULE, DELAYED RELEASE ORAL EVERY MORNING
COMMUNITY
Start: 2023-11-29

## 2024-01-04 RX ORDER — KETOROLAC TROMETHAMINE 30 MG/ML
60 INJECTION, SOLUTION INTRAMUSCULAR; INTRAVENOUS
Status: COMPLETED | OUTPATIENT
Start: 2024-01-04 | End: 2024-01-04

## 2024-01-04 RX ORDER — METHYLPREDNISOLONE ACETATE 80 MG/ML
160 INJECTION, SUSPENSION INTRA-ARTICULAR; INTRALESIONAL; INTRAMUSCULAR; SOFT TISSUE
Status: COMPLETED | OUTPATIENT
Start: 2024-01-04 | End: 2024-01-04

## 2024-01-04 RX ORDER — SEMAGLUTIDE 1 MG/.5ML
1 INJECTION, SOLUTION SUBCUTANEOUS
Qty: 1 EACH | Refills: 3 | Status: SHIPPED | OUTPATIENT
Start: 2024-01-04 | End: 2024-01-22 | Stop reason: SDUPTHER

## 2024-01-04 RX ADMIN — METHYLPREDNISOLONE ACETATE 160 MG: 80 INJECTION, SUSPENSION INTRA-ARTICULAR; INTRALESIONAL; INTRAMUSCULAR; SOFT TISSUE at 03:01

## 2024-01-04 RX ADMIN — KETOROLAC TROMETHAMINE 60 MG: 30 INJECTION, SOLUTION INTRAMUSCULAR; INTRAVENOUS at 03:01

## 2024-01-04 RX ADMIN — CYANOCOBALAMIN 1000 MCG: 1000 INJECTION, SOLUTION INTRAMUSCULAR; SUBCUTANEOUS at 03:01

## 2024-01-04 ASSESSMENT — ROUTINE ASSESSMENT OF PATIENT INDEX DATA (RAPID3)
MDHAQ FUNCTION SCORE: 1.2
FATIGUE SCORE: 3.3
PATIENT GLOBAL ASSESSMENT SCORE: 7
TOTAL RAPID3 SCORE: 6.33
PAIN SCORE: 8
PSYCHOLOGICAL DISTRESS SCORE: 3.3

## 2024-01-04 NOTE — PATIENT INSTRUCTIONS
Continue Humira every 14 days    Start wegovy 1 mg once weekly    Please complete fasting labs soon for (lipid panel and HLAB27)

## 2024-01-04 NOTE — PROGRESS NOTES
Subjective:       Patient ID: Osiris Billingsley is a 53 y.o. female.    Chief Complaint: Disease Management      Mrs. Billingsley is a 53 year old female who presents to clinic for follow up on rheumatoid arthritis, psoriatic arthritis, and UC. She is doing fair overall on Humira. She was off treatment for 1 month recently due to hold up with [facility] delivery. She is having arthritis flare in her knees, hips, and wrists. Ozempic was denied by insurance without diabetes diagnosis, but she was doing well on this earlier last year. Weight has increased significantly since her last visit.      She has chronic constipation and d/jaida linzess because it was too strong causing diarrhea. Did not try amitiza yet.Taking norco per for severe pain.     Tizanidine helps with neck/headache pain. Taking ibuprofen PRN for pain.    Current treatment:  1. Humira  2. Norco    Prior treatment:  Humira  Enbrel    Review of Systems   Constitutional: Positive for activity change. Negative for appetite change, chills, fatigue and fever.   Eyes: Negative for visual disturbance.   Respiratory: Negative for cough.    Cardiovascular: Negative for chest pain.   Gastrointestinal: Negative for abdominal pain and constipation.   Musculoskeletal: Positive for arthralgias and joint swelling.   Skin: Negative for rash.   Allergic/Immunologic: Positive for immunocompromised state.   Neurological: Negative for dizziness, weakness and light-headedness.   Psychiatric/Behavioral: Negative for anxiety.         Objective:     Vitals:    24 1340   BP: 125/78   Pulse: 78       Past Medical History:   Diagnosis Date    Colitis     Dry eyes     Dry mouth     Neuromuscular disorder     Premature menopause 1999    Rheumatoid arthritis      Past Surgical History:   Procedure Laterality Date     SECTION            Physical Exam   Constitutional: She is well-developed, well-nourished, and in no distress.   Eyes: Right conjunctiva is not injected. Left  conjunctiva is not injected.   Neck: No JVD present. No thyromegaly present.   Cardiovascular: Normal rate and regular rhythm.  Exam reveals no decreased pulses.    Pulmonary/Chest: Effort normal.  Lymphadenopathy:     She has no cervical adenopathy.   Neurological: Gait normal.   Skin: No rash noted.     Psychiatric: Affect normal.   Musculoskeletal: Tenderness and edema present.        Component      Latest Ref Rng 10/5/2023   WBC      3.90 - 12.70 K/uL 6.10    RBC      4.00 - 5.40 M/uL 4.42    Hemoglobin      12.0 - 16.0 g/dL 13.3    Hematocrit      37.0 - 48.5 % 41.2    MCV      82 - 98 fL 93    MCH      27.0 - 31.0 pg 30.1    MCHC      32.0 - 36.0 g/dL 32.3    RDW      11.5 - 14.5 % 12.7    Platelet Count      150 - 450 K/uL 241    MPV      9.2 - 12.9 fL 11.8    Immature Granulocytes      0.0 - 0.5 % 0.2    Gran # (ANC)      1.8 - 7.7 K/uL 3.2    Immature Grans (Abs)      0.00 - 0.04 K/uL 0.01    Lymph #      1.0 - 4.8 K/uL 2.2    Mono #      0.3 - 1.0 K/uL 0.4    Eos #      0.0 - 0.5 K/uL 0.2    Baso #      0.00 - 0.20 K/uL 0.04    nRBC      0 /100 WBC 0    Gran %      38.0 - 73.0 % 52.7    Lymph %      18.0 - 48.0 % 35.9    Mono %      4.0 - 15.0 % 6.6    Eosinophil %      0.0 - 8.0 % 3.9    Basophil %      0.0 - 1.9 % 0.7    Differential Method Automated    Sodium      136 - 145 mmol/L 139    Potassium      3.5 - 5.1 mmol/L 4.6    Chloride      95 - 110 mmol/L 106    CO2      23 - 29 mmol/L 25    Glucose      70 - 110 mg/dL 88    BUN      6 - 20 mg/dL 15    Creatinine      0.5 - 1.4 mg/dL 0.8    Calcium      8.7 - 10.5 mg/dL 9.4    PROTEIN TOTAL      6.0 - 8.4 g/dL 7.2    Albumin      3.5 - 5.2 g/dL 4.0    BILIRUBIN TOTAL      0.1 - 1.0 mg/dL 0.7    ALP      55 - 135 U/L 58    AST      10 - 40 U/L 15    ALT      10 - 44 U/L 11    eGFR      >60 mL/min/1.73 m^2 >60.0    Anion Gap      8 - 16 mmol/L 8    NIL      IU/mL 0.97324    TB1 - Nil      IU/mL 0.017    TB2 - Nil      IU/mL 0.040    Mitogen - Nil       IU/mL 1.779    TB Gold Plus      Negative  Negative    CRP      0.0 - 8.2 mg/L 0.4    Sed Rate      0 - 20 mm/Hr 9    Hepatitis C Ab      Non-reactive  Non-reactive    Hepatitis B Surface Ag      Non-reactive  Non-reactive    Hep B Core Total Ab      Non-reactive  Non-reactive        Assessment:       1. Psoriatic arthritis    2. Seronegative rheumatoid arthritis    3. Overweight with body mass index (BMI) of 27 to 27.9 in adult    4. Hyperlipidemia, unspecified hyperlipidemia type    5. Herpes zoster without complication    6. Immunocompromised state due to drug therapy    7. Chronic pain syndrome                  Plan:       Psoriatic arthritis  -     HLA B27 ANTIGEN; Future; Expected date: 01/04/2024  -     ketorolac injection 60 mg  -     methylPREDNISolone acetate injection 160 mg  -     cyanocobalamin injection 1,000 mcg    Seronegative rheumatoid arthritis  -     HLA B27 ANTIGEN; Future; Expected date: 01/04/2024  -     ketorolac injection 60 mg  -     methylPREDNISolone acetate injection 160 mg  -     cyanocobalamin injection 1,000 mcg    Overweight with body mass index (BMI) of 27 to 27.9 in adult  -     semaglutide, weight loss, (WEGOVY) 1 mg/0.5 mL PnIj; Inject 1 mg into the skin every 7 days.  Dispense: 1 each; Refill: 3    Hyperlipidemia, unspecified hyperlipidemia type  -     semaglutide, weight loss, (WEGOVY) 1 mg/0.5 mL PnIj; Inject 1 mg into the skin every 7 days.  Dispense: 1 each; Refill: 3  -     Lipid Panel; Future; Expected date: 01/04/2024    Herpes zoster without complication  -     valACYclovir (VALTREX) 1000 MG tablet; Take 1 tablet (1,000 mg total) by mouth every 12 (twelve) hours.  Dispense: 60 tablet; Refill: 1    Immunocompromised state due to drug therapy    Chronic pain syndrome              Assessment:  53 year old female with  Seronegative rheumatoid arthritis, psoriatic arthritis, psoriasis, ulcerative colitis, and uveitis  --history of ARLIN 1:160 homogenous  --IBS  w/constipation  --chronic pain syndrome    Plan:  1. Toradol 60, depo 160, b12 today  2. Cont humira every 14 days  3. Cont ibuprofen prn, tizanidine prn  4. Trial of amitiza  5. Start wegovy 1 mg (previously on ozempic 0.5 mg without any issues x 8 weeks)  6. Valtrex prn  7. Fasting labs soon      Follow up:   4 mo Dr. Devi

## 2024-01-05 NOTE — TELEPHONE ENCOUNTER
In clinic today   Patient arrives to triage for swelling to right hand since Sunday morning believed to be caused by a tick bite. Took 25mg of benadryl about 1hr 30 min ago.

## 2024-01-07 RX ORDER — HYDROCODONE BITARTRATE AND ACETAMINOPHEN 7.5; 325 MG/1; MG/1
1 TABLET ORAL EVERY 8 HOURS PRN
Qty: 90 TABLET | Refills: 0 | Status: SHIPPED | OUTPATIENT
Start: 2024-01-07

## 2024-01-08 ENCOUNTER — PATIENT MESSAGE (OUTPATIENT)
Dept: RHEUMATOLOGY | Facility: CLINIC | Age: 54
End: 2024-01-08
Payer: COMMERCIAL

## 2024-01-08 DIAGNOSIS — L40.50 PSORIATIC ARTHRITIS: Primary | ICD-10-CM

## 2024-01-08 DIAGNOSIS — E66.3 OVERWEIGHT WITH BODY MASS INDEX (BMI) OF 27 TO 27.9 IN ADULT: ICD-10-CM

## 2024-01-08 DIAGNOSIS — E09.69 DRUG OR CHEMICAL INDUCED DIABETES MELLITUS WITH OTHER SPECIFIED COMPLICATION, UNSPECIFIED WHETHER LONG TERM INSULIN USE: ICD-10-CM

## 2024-01-08 DIAGNOSIS — E78.5 HYPERLIPIDEMIA, UNSPECIFIED HYPERLIPIDEMIA TYPE: ICD-10-CM

## 2024-01-09 ENCOUNTER — LAB VISIT (OUTPATIENT)
Dept: LAB | Facility: HOSPITAL | Age: 54
End: 2024-01-09
Attending: PHYSICIAN ASSISTANT
Payer: COMMERCIAL

## 2024-01-09 DIAGNOSIS — E78.5 HYPERLIPIDEMIA, UNSPECIFIED HYPERLIPIDEMIA TYPE: ICD-10-CM

## 2024-01-09 DIAGNOSIS — L40.50 PSORIATIC ARTHRITIS: ICD-10-CM

## 2024-01-09 DIAGNOSIS — M06.00 SERONEGATIVE RHEUMATOID ARTHRITIS: ICD-10-CM

## 2024-01-09 LAB
CHOLEST SERPL-MCNC: 355 MG/DL (ref 120–199)
CHOLEST/HDLC SERPL: 4.6 {RATIO} (ref 2–5)
HDLC SERPL-MCNC: 78 MG/DL (ref 40–75)
HDLC SERPL: 22 % (ref 20–50)
LDLC SERPL CALC-MCNC: 246.4 MG/DL (ref 63–159)
NONHDLC SERPL-MCNC: 277 MG/DL
TRIGL SERPL-MCNC: 153 MG/DL (ref 30–150)

## 2024-01-09 PROCEDURE — 81374 HLA I TYPING 1 ANTIGEN LR: CPT | Mod: PO | Performed by: PHYSICIAN ASSISTANT

## 2024-01-09 PROCEDURE — 80061 LIPID PANEL: CPT | Performed by: PHYSICIAN ASSISTANT

## 2024-01-09 PROCEDURE — 36415 COLL VENOUS BLD VENIPUNCTURE: CPT | Mod: PO | Performed by: PHYSICIAN ASSISTANT

## 2024-01-15 RX ORDER — TIRZEPATIDE 5 MG/.5ML
5 INJECTION, SOLUTION SUBCUTANEOUS
Qty: 4 PEN | Refills: 5 | Status: SHIPPED | OUTPATIENT
Start: 2024-01-15 | End: 2024-01-17 | Stop reason: SDUPTHER

## 2024-01-17 DIAGNOSIS — L40.50 PSORIATIC ARTHRITIS: ICD-10-CM

## 2024-01-17 NOTE — TELEPHONE ENCOUNTER
Was the PA completed for wegovy? Can you please also update the patient if it was done.     Does zepbound need to be sent--I see it is pended? Please clarify

## 2024-01-17 NOTE — TELEPHONE ENCOUNTER
Zepbound was signed by Dr. Devi 1/15. Do we know if this is covered? I'm very confused with the messaging

## 2024-01-21 RX ORDER — TIRZEPATIDE 5 MG/.5ML
5 INJECTION, SOLUTION SUBCUTANEOUS
Qty: 4 PEN | Refills: 5 | Status: SHIPPED | OUTPATIENT
Start: 2024-01-21 | End: 2024-01-22

## 2024-01-22 RX ORDER — SEMAGLUTIDE 1 MG/.5ML
1 INJECTION, SOLUTION SUBCUTANEOUS
Qty: 1 EACH | Refills: 3 | Status: SHIPPED | OUTPATIENT
Start: 2024-01-22 | End: 2024-02-01

## 2024-01-24 RX ORDER — SEMAGLUTIDE 1.34 MG/ML
1 INJECTION, SOLUTION SUBCUTANEOUS
Qty: 3 ML | Refills: 5 | Status: SHIPPED | OUTPATIENT
Start: 2024-01-24 | End: 2024-02-01

## 2024-01-31 ENCOUNTER — PATIENT MESSAGE (OUTPATIENT)
Dept: RHEUMATOLOGY | Facility: CLINIC | Age: 54
End: 2024-01-31
Payer: COMMERCIAL

## 2024-02-01 ENCOUNTER — OFFICE VISIT (OUTPATIENT)
Dept: CARDIOLOGY | Facility: CLINIC | Age: 54
End: 2024-02-01
Payer: COMMERCIAL

## 2024-02-01 VITALS
RESPIRATION RATE: 16 BRPM | OXYGEN SATURATION: 98 % | BODY MASS INDEX: 26.68 KG/M2 | HEART RATE: 76 BPM | HEIGHT: 62 IN | DIASTOLIC BLOOD PRESSURE: 78 MMHG | WEIGHT: 145 LBS | SYSTOLIC BLOOD PRESSURE: 140 MMHG

## 2024-02-01 DIAGNOSIS — R06.09 DYSPNEA ON EXERTION: ICD-10-CM

## 2024-02-01 DIAGNOSIS — E78.01 FAMILIAL HYPERCHOLESTEROLEMIA: Primary | ICD-10-CM

## 2024-02-01 DIAGNOSIS — L40.50 PSORIATIC ARTHRITIS: ICD-10-CM

## 2024-02-01 DIAGNOSIS — Z86.69 HX OF MIGRAINE HEADACHES: ICD-10-CM

## 2024-02-01 DIAGNOSIS — K90.0 CELIAC DISEASE: ICD-10-CM

## 2024-02-01 DIAGNOSIS — R07.89 CHEST TIGHTNESS: ICD-10-CM

## 2024-02-01 DIAGNOSIS — M06.9 RHEUMATOID ARTHRITIS, INVOLVING UNSPECIFIED SITE, UNSPECIFIED WHETHER RHEUMATOID FACTOR PRESENT: ICD-10-CM

## 2024-02-01 DIAGNOSIS — K51.00 ULCERATIVE PANCOLITIS WITHOUT COMPLICATION: ICD-10-CM

## 2024-02-01 DIAGNOSIS — R94.31 NONSPECIFIC ABNORMAL ELECTROCARDIOGRAM (ECG) (EKG): ICD-10-CM

## 2024-02-01 DIAGNOSIS — M35.00 SJOGREN'S SYNDROME, WITH UNSPECIFIED ORGAN INVOLVEMENT: ICD-10-CM

## 2024-02-01 PROCEDURE — 1159F MED LIST DOCD IN RCRD: CPT | Mod: CPTII,S$GLB,, | Performed by: INTERNAL MEDICINE

## 2024-02-01 PROCEDURE — 1160F RVW MEDS BY RX/DR IN RCRD: CPT | Mod: CPTII,S$GLB,, | Performed by: INTERNAL MEDICINE

## 2024-02-01 PROCEDURE — 93000 ELECTROCARDIOGRAM COMPLETE: CPT | Mod: S$GLB,,, | Performed by: INTERNAL MEDICINE

## 2024-02-01 PROCEDURE — 3078F DIAST BP <80 MM HG: CPT | Mod: CPTII,S$GLB,, | Performed by: INTERNAL MEDICINE

## 2024-02-01 PROCEDURE — 99205 OFFICE O/P NEW HI 60 MIN: CPT | Mod: S$GLB,,, | Performed by: INTERNAL MEDICINE

## 2024-02-01 PROCEDURE — 99999 PR PBB SHADOW E&M-EST. PATIENT-LVL V: CPT | Mod: PBBFAC,,, | Performed by: INTERNAL MEDICINE

## 2024-02-01 PROCEDURE — 3008F BODY MASS INDEX DOCD: CPT | Mod: CPTII,S$GLB,, | Performed by: INTERNAL MEDICINE

## 2024-02-01 PROCEDURE — 3077F SYST BP >= 140 MM HG: CPT | Mod: CPTII,S$GLB,, | Performed by: INTERNAL MEDICINE

## 2024-02-01 NOTE — PROGRESS NOTES
Subjective:    Patient ID:  Osiris Billingsley is a 53 y.o. female     Chief Complaint   Patient presents with    Establish Care    Hypertension    Shortness of Breath       HPI:  Ms Osiris Billingsley is a 53 y.o. female is here for initial consultation.    Patient had been diagnosed with having high cholesterol.  Even as a young woman she had elevated cholesterol even when she was thin and not eating has well.  Last year her cholesterol was significantly elevated and she was on Xeljanz and it was stopped because of the elevated cholesterol however she still continues to have significantly elevated numbers.  Patient has a sensation of tightness in the chest mostly in the substernal area and she has been having this on and off for quite some time and there is no aggravating or relieving factors.  Patient also has a breathing issue where she has to catch her breath almost like a hiccup and she does that a few times a day and had been doing it for quite some time and she is concerned about the issue of breathing.    In the past few days she also had a sensation where she could hear her heart beating in her right here and had been concerned about those issues.    At the present time patient is comfortable not in any acute distress and her breathing has been stable.  Denies any active chest pain.  She has been taking all her medications regularly.    She also has history of some autoimmune issues.  And she follows up with the rheumatologist for the same.    Review of patient's allergies indicates:   Allergen Reactions    Xeljanz [tofacitinib] Other (See Comments)     Elevated lipids    Decadron [dexamethasone]      Insomnia      Enbrel [etanercept] Hives    Fremanezumab-vfrm     Sulfa (sulfonamide antibiotics)        Past Medical History:   Diagnosis Date    Colitis     Dry eyes     Dry mouth     Neuromuscular disorder     Premature menopause 1999    Rheumatoid arthritis      Past Surgical History:   Procedure  Laterality Date     SECTION       Social History     Tobacco Use    Smoking status: Never    Smokeless tobacco: Never   Substance Use Topics    Alcohol use: No    Drug use: No     History reviewed. No pertinent family history.     Review of Systems:   Constitution: Negative for diaphoresis and fever.   HEENT: Negative for nosebleeds.    Cardiovascular: Negative for chest pain, chest tightness       No dyspnea on exertion       No leg swelling        No palpitations  Respiratory: Negative for shortness of breath and wheezing.    Hematologic/Lymphatic: Negative for bleeding problem. Does not bruise/bleed easily.   Skin: Negative for color change and rash.   Musculoskeletal: Negative for falls and myalgias.   Gastrointestinal: Negative for hematemesis and hematochezia.   Genitourinary: Negative for hematuria.   Neurological: Negative for dizziness and light-headedness.   Psychiatric/Behavioral: Negative for altered mental status and memory loss.          Objective:        Vitals:    24 1525   BP: (!) 140/78   Pulse: 76   Resp: 16       Lab Results   Component Value Date    WBC 6.10 10/05/2023    HGB 13.3 10/05/2023    HCT 41.2 10/05/2023     10/05/2023    CHOL 355 (H) 2024    TRIG 153 (H) 2024    HDL 78 (H) 2024    ALT 11 10/05/2023    AST 15 10/05/2023     10/05/2023    K 4.6 10/05/2023     10/05/2023    CREATININE 0.8 10/05/2023    BUN 15 10/05/2023    CO2 25 10/05/2023    TSH 2.141 2022    HGBA1C 5.5 2021        ECHOCARDIOGRAM RESULTS  No results found for this or any previous visit.        CURRENT/PREVIOUS VISIT EKG    No valid procedures specified.   No results found for this or any previous visit.      Physical Exam:  CONSTITUTIONAL: No fever, no chills  HEENT: Normocephalic, atraumatic,pupils reactive to light                 NECK:  No JVD no carotid bruit  CVS: S1S2+, RRR, systolic murmurs,   LUNGS: Clear  ABDOMEN: Soft, NT, BS+  EXTREMITIES: No  cyanosis, edema  : No marcano catheter  NEURO: AAO X 3  PSY: Normal affect      Medication List with Changes/Refills   Current Medications    ACYCLOVIR 5% (ZOVIRAX) 5 % OINTMENT        ADALIMUMAB (HUMIRA,CF, PEN) 40 MG/0.4 ML PNKT    Inject 0.4 mLs (40 mg total) into the skin every 14 (fourteen) days.    CLONAZEPAM (KLONOPIN) 0.5 MG TABLET    Take 1 tablet (0.5 mg total) by mouth 2 (two) times daily as needed for Anxiety.    ESCITALOPRAM OXALATE (LEXAPRO) 20 MG TABLET    Take 1 tablet (20 mg total) by mouth once daily.    FAMOTIDINE (PEPCID) 20 MG TABLET    Take 1 tablet (20 mg total) by mouth 2 (two) times daily.    HYDROCODONE-ACETAMINOPHEN (NORCO) 7.5-325 MG PER TABLET    Take 1 tablet by mouth every 8 (eight) hours as needed for Pain.    IBUPROFEN (ADVIL,MOTRIN) 800 MG TABLET    Take 1 tablet (800 mg total) by mouth 3 (three) times daily as needed for Pain.    LIDOCAINE-PRILOCAINE (EMLA) CREAM    Apply topically as needed.    LUBIPROSTONE (AMITIZA) 8 MCG CAP    Take 1 capsule (8 mcg total) by mouth 2 (two) times daily with meals.    OMEPRAZOLE (PRILOSEC) 40 MG CAPSULE    Take 40 mg by mouth every morning.    PROGESTERONE (PROMETRIUM) 200 MG CAPSULE    Take 200 mg by mouth every evening.    RIZATRIPTAN (MAXALT) 10 MG TABLET    Take 1 tablet (10 mg total) by mouth as needed for Migraine.    SUMATRIPTAN (IMITREX) 100 MG TABLET    Take 100 mg by mouth.    TIZANIDINE (ZANAFLEX) 4 MG TABLET    Take 1 tablet (4 mg total) by mouth every 8 (eight) hours as needed (neck pain).    TRAZODONE (DESYREL) 100 MG TABLET    TAKE 1 TABLET(100 MG) BY MOUTH EVERY EVENING    VALACYCLOVIR (VALTREX) 1000 MG TABLET    Take 1 tablet (1,000 mg total) by mouth every 12 (twelve) hours.   Discontinued Medications    SEMAGLUTIDE (OZEMPIC) 1 MG/DOSE (4 MG/3 ML)    Inject 1 mg into the skin every 7 days.    SEMAGLUTIDE, WEIGHT LOSS, (WEGOVY) 1 MG/0.5 ML PNIJ    Inject 1 mg into the skin every 7 days.    VYVANSE 40 MG CAP    Take 40 mg by mouth  once daily.             Assessment:       1. Familial hypercholesterolemia    2. Rheumatoid arthritis, involving unspecified site, unspecified whether rheumatoid factor present    3. Nonspecific abnormal electrocardiogram (ECG) (EKG)    4. Sjogren's syndrome, with unspecified organ involvement    5. Ulcerative pancolitis without complication    6. Celiac disease    7. Psoriatic arthritis    8. Hx of migraine headaches         Plan:     1. Familial hypercholesterolemia   Patient has significantly elevated cholesterol her total cholesterol is 355 HDL is 78 LDL is 246 and triglycerides 153.  Discussed with patient in regards with elevated cholesterol.  Patient to change her diet patient is going to switch over to Mediterranean diet and also go into low-fat low-cholesterol diet.  Patient wants to try diet and recheck her cholesterol in 1 month's time and see if that would make any difference in her in her blood work.  If she continues to have elevated blood work then she prefers to be on medication at that time.  We consideration to start her on rosuvastatin will start her on 5 mg and will gradually increase up to the required doses.      2. Chest tightness   Patient has been having intermittent chest tightness and needs further evaluation especially given the fact that she has significant hypercholesterolemia.  And she has also nonspecific ST T wave changes in the on the EKG.  Will schedule her for myocardial perfusion study.    3. Dyspnea on exertion.    Patient has almost like he hiccups like breathing and she does have to catch her breath intermittently.  Etiology is unclear however.  And for now need to do the stress test.  4. EKG   Reviewed EKG independently patient is in normal sinus rhythm with normal intervals and nonspecific ST T-wave changes in the inferior lateral leads.  5. Autoimmune diseases   Patient has significant rheumatoid arthritis and Sjogren syndrome along with ulcerative colitis and celiac  disease and psoriatic arthritis.  And she does follow-up with her rheumatologist and has been on different medications.  At the present time patient to continue the same meds.  6. I will see her back in the office after the testing has been completed will also do a 2D echocardiogram for LV function ejection fraction and wall motion abnormality.            Problem List Items Addressed This Visit          Orthopedic    Psoriatic arthritis     Other Visit Diagnoses       Familial hypercholesterolemia    -  Primary    Rheumatoid arthritis, involving unspecified site, unspecified whether rheumatoid factor present        Nonspecific abnormal electrocardiogram (ECG) (EKG)        Relevant Orders    IN OFFICE EKG 12-LEAD (to [facility])    Sjogren's syndrome, with unspecified organ involvement        Ulcerative pancolitis without complication        Celiac disease        Hx of migraine headaches                No follow-ups on file.

## 2024-02-07 ENCOUNTER — LAB VISIT (OUTPATIENT)
Dept: LAB | Facility: HOSPITAL | Age: 54
End: 2024-02-07
Attending: FAMILY MEDICINE
Payer: COMMERCIAL

## 2024-02-07 DIAGNOSIS — E78.00 PURE HYPERCHOLESTEROLEMIA: Primary | ICD-10-CM

## 2024-02-07 DIAGNOSIS — R73.9 BLOOD GLUCOSE ELEVATED: ICD-10-CM

## 2024-02-07 LAB
T3 SERPL-MCNC: 77 NG/DL (ref 60–180)
T4 FREE SERPL-MCNC: 1.05 NG/DL (ref 0.71–1.51)
TSH SERPL DL<=0.005 MIU/L-ACNC: 1.71 UIU/ML (ref 0.4–4)

## 2024-02-07 PROCEDURE — 83036 HEMOGLOBIN GLYCOSYLATED A1C: CPT | Performed by: FAMILY MEDICINE

## 2024-02-07 PROCEDURE — 84439 ASSAY OF FREE THYROXINE: CPT | Performed by: FAMILY MEDICINE

## 2024-02-07 PROCEDURE — 84445 ASSAY OF TSI GLOBULIN: CPT | Performed by: FAMILY MEDICINE

## 2024-02-07 PROCEDURE — 84443 ASSAY THYROID STIM HORMONE: CPT | Performed by: FAMILY MEDICINE

## 2024-02-07 PROCEDURE — 84480 ASSAY TRIIODOTHYRONINE (T3): CPT | Performed by: FAMILY MEDICINE

## 2024-02-07 PROCEDURE — 84432 ASSAY OF THYROGLOBULIN: CPT | Performed by: FAMILY MEDICINE

## 2024-02-07 PROCEDURE — 84482 T3 REVERSE: CPT | Performed by: FAMILY MEDICINE

## 2024-02-08 LAB
ESTIMATED AVG GLUCOSE: 111 MG/DL (ref 68–131)
HBA1C MFR BLD: 5.5 % (ref 4–5.6)

## 2024-02-09 ENCOUNTER — PATIENT MESSAGE (OUTPATIENT)
Dept: RHEUMATOLOGY | Facility: CLINIC | Age: 54
End: 2024-02-09
Payer: COMMERCIAL

## 2024-02-09 LAB
OHS QRS DURATION: 82 MS
OHS QTC CALCULATION: 425 MS
THRYOGLOBULIN INTERPRETATION: ABNORMAL
THYROGLOB AB SERPL-ACNC: <1.8 IU/ML
THYROGLOB SERPL-MCNC: 9.3 NG/ML

## 2024-02-12 LAB — TSI SER-ACNC: <0.1 IU/L

## 2024-02-13 LAB — T3REVERSE SERPL-MCNC: 16.1 NG/DL (ref 9–27)

## 2024-02-13 NOTE — TELEPHONE ENCOUNTER
I called pt and discussed that as fas as I know that thyroglobulin tumor marker is used to follow patient s/p thyroidectomy and not sure of clinical relevance otherwise, but she need to f/u with primary care for definitive answer.

## 2024-02-16 DIAGNOSIS — M54.2 NECK PAIN: ICD-10-CM

## 2024-02-16 DIAGNOSIS — L40.50 PSORIATIC ARTHRITIS: ICD-10-CM

## 2024-02-16 RX ORDER — TIZANIDINE 4 MG/1
4 TABLET ORAL EVERY 8 HOURS PRN
Qty: 90 TABLET | Refills: 3 | Status: SHIPPED | OUTPATIENT
Start: 2024-02-16

## 2024-02-23 ENCOUNTER — TELEPHONE (OUTPATIENT)
Dept: CARDIOLOGY | Facility: HOSPITAL | Age: 54
End: 2024-02-23

## 2024-02-23 NOTE — TELEPHONE ENCOUNTER
Left message on voicemail.     Patient advised, test will be at [facility] ([address]   Will need to register on the first floor at the main entrance.   Patient advised that arrival time is 7:00am.  Patient advised that she may be here about 3.5-4 hours, and may want to bring something to occupy their time, as there will be periods of waiting.    Patient advised, may take her medications prior to testing if you need to.  Advised if she needs to eat to take her medications, please keep it light, like toast and juice.    Patient advised to avoid all caffeine 12 hours prior to testing.  This includes decaf tea and coffee.    Will provide peanut butter crackers for a snack after stress test.  If patient would prefer something else, please bring a snack from home.    Wear comfortable clothing.   No lotions, oils, or powders to the upper chest area. May wear deodorant.    No metal jewelry, buttons, or zippers to the upper body.  Advised to call the office if any questions.     Will send instructions via [facility] as well.

## 2024-02-26 ENCOUNTER — HOSPITAL ENCOUNTER (OUTPATIENT)
Dept: CARDIOLOGY | Facility: HOSPITAL | Age: 54
Discharge: HOME | End: 2024-02-26
Attending: INTERNAL MEDICINE
Payer: COMMERCIAL

## 2024-02-26 ENCOUNTER — HOSPITAL ENCOUNTER (OUTPATIENT)
Dept: RADIOLOGY | Facility: HOSPITAL | Age: 54
Discharge: HOME | End: 2024-02-26
Attending: INTERNAL MEDICINE
Payer: COMMERCIAL

## 2024-02-26 VITALS — BODY MASS INDEX: 26.68 KG/M2 | HEIGHT: 62 IN | WEIGHT: 145 LBS

## 2024-02-26 DIAGNOSIS — M06.9 RHEUMATOID ARTHRITIS, INVOLVING UNSPECIFIED SITE, UNSPECIFIED WHETHER RHEUMATOID FACTOR PRESENT: ICD-10-CM

## 2024-02-26 DIAGNOSIS — E78.01 FAMILIAL HYPERCHOLESTEROLEMIA: ICD-10-CM

## 2024-02-26 DIAGNOSIS — L40.50 PSORIATIC ARTHRITIS: ICD-10-CM

## 2024-02-26 DIAGNOSIS — K51.00 ULCERATIVE PANCOLITIS WITHOUT COMPLICATION: ICD-10-CM

## 2024-02-26 DIAGNOSIS — K90.0 CELIAC DISEASE: ICD-10-CM

## 2024-02-26 DIAGNOSIS — R94.31 NONSPECIFIC ABNORMAL ELECTROCARDIOGRAM (ECG) (EKG): ICD-10-CM

## 2024-02-26 DIAGNOSIS — Z86.69 HX OF MIGRAINE HEADACHES: ICD-10-CM

## 2024-02-26 DIAGNOSIS — R06.09 DYSPNEA ON EXERTION: ICD-10-CM

## 2024-02-26 DIAGNOSIS — R07.89 CHEST TIGHTNESS: ICD-10-CM

## 2024-02-26 DIAGNOSIS — M35.00 SJOGREN'S SYNDROME, WITH UNSPECIFIED ORGAN INVOLVEMENT: ICD-10-CM

## 2024-02-26 LAB
AORTIC ROOT ANNULUS: 2.7 CM
AORTIC VALVE CUSP SEPERATION: 1.6 CM
AV INDEX (PROSTH): 0.84
AV MEAN GRADIENT: 3 MMHG
AV PEAK GRADIENT: 6 MMHG
AV VALVE AREA BY VELOCITY RATIO: 2.32 CM²
AV VALVE AREA: 2.64 CM²
AV VELOCITY RATIO: 0.74
BSA FOR ECHO PROCEDURE: 1.7 M2
CV ECHO LV RWT: 0.56 CM
CV STRESS BASE HR: 59 BPM
DIASTOLIC BLOOD PRESSURE: 82 MMHG
DOP CALC AO PEAK VEL: 1.22 M/S
DOP CALC AO VTI: 27.6 CM
DOP CALC LVOT AREA: 3.1 CM2
DOP CALC LVOT DIAMETER: 2 CM
DOP CALC LVOT PEAK VEL: 0.9 M/S
DOP CALC LVOT STROKE VOLUME: 72.85 CM3
DOP CALCLVOT PEAK VEL VTI: 23.2 CM
E WAVE DECELERATION TIME: 227 MSEC
E/A RATIO: 1.49
E/E' RATIO: 7.58 M/S
ECHO LV POSTERIOR WALL: 1.1 CM (ref 0.6–1.1)
EJECTION FRACTION- HIGH: 65 %
END DIASTOLIC INDEX-HIGH: 153 ML/M2
END DIASTOLIC INDEX-LOW: 93 ML/M2
END SYSTOLIC INDEX-HIGH: 71 ML/M2
END SYSTOLIC INDEX-LOW: 31 ML/M2
FRACTIONAL SHORTENING: 33 % (ref 28–44)
HLA B27 INTERPRETATION: NORMAL
HLA-B27 RELATED AG QL: NEGATIVE
HLA-B27 RELATED AG QL: NORMAL
INTERVENTRICULAR SEPTUM: 1 CM (ref 0.6–1.1)
IVRT: 100 MSEC
LEFT ATRIUM SIZE: 2.8 CM
LEFT INTERNAL DIMENSION IN SYSTOLE: 2.63 CM (ref 2.1–4)
LEFT VENTRICLE DIASTOLIC VOLUME INDEX: 40.42 ML/M2
LEFT VENTRICLE DIASTOLIC VOLUME: 67.5 ML
LEFT VENTRICLE MASS INDEX: 80 G/M2
LEFT VENTRICLE SYSTOLIC VOLUME INDEX: 15.1 ML/M2
LEFT VENTRICLE SYSTOLIC VOLUME: 25.3 ML
LEFT VENTRICULAR INTERNAL DIMENSION IN DIASTOLE: 3.94 CM (ref 3.5–6)
LEFT VENTRICULAR MASS: 133.04 G
LV LATERAL E/E' RATIO: 6.5 M/S
LV SEPTAL E/E' RATIO: 9.1 M/S
LVOT MG: 2 MMHG
LVOT MV: 0.58 CM/S
MV PEAK A VEL: 0.61 M/S
MV PEAK E VEL: 0.91 M/S
MV STENOSIS PRESSURE HALF TIME: 56 MS
MV VALVE AREA P 1/2 METHOD: 3.93 CM2
NUC REST DIASTOLIC VOLUME INDEX: 63
NUC REST EJECTION FRACTION: 79
NUC REST SYSTOLIC VOLUME INDEX: 13
NUC STRESS DIASTOLIC VOLUME INDEX: 57
NUC STRESS EJECTION FRACTION: 75 %
NUC STRESS SYSTOLIC VOLUME INDEX: 14
OHS CV CPX 1 MINUTE RECOVERY HEART RATE: 117 BPM
OHS CV CPX 85 PERCENT MAX PREDICTED HEART RATE MALE: 144
OHS CV CPX ESTIMATED METS: 12
OHS CV CPX MAX PREDICTED HEART RATE: 169
OHS CV CPX PATIENT IS FEMALE: 1
OHS CV CPX PATIENT IS MALE: 0
OHS CV CPX PEAK DIASTOLIC BLOOD PRESSURE: 76 MMHG
OHS CV CPX PEAK HEAR RATE: 149 BPM
OHS CV CPX PEAK RATE PRESSURE PRODUCT: NORMAL
OHS CV CPX PEAK SYSTOLIC BLOOD PRESSURE: 172 MMHG
OHS CV CPX PERCENT MAX PREDICTED HEART RATE ACHIEVED: 92
OHS CV CPX RATE PRESSURE PRODUCT PRESENTING: 7198
PISA TR MAX VEL: 2.13 M/S
RA PRESSURE ESTIMATED: 3 MMHG
RETIRED EF AND QEF - SEE NOTES: 53 %
RIGHT VENTRICULAR END-DIASTOLIC DIMENSION: 3.1 CM
RV TB RVSP: 5 MMHG
STRESS ECHO POST EXERCISE DUR MIN: 10 MINUTES
STRESS ECHO POST EXERCISE DUR SEC: 29 SECONDS
SYSTOLIC BLOOD PRESSURE: 122 MMHG
TDI LATERAL: 0.14 M/S
TDI SEPTAL: 0.1 M/S
TDI: 0.12 M/S
TR MAX PG: 18 MMHG
TV REST PULMONARY ARTERY PRESSURE: 21 MMHG
Z-SCORE OF LEFT VENTRICULAR DIMENSION IN END DIASTOLE: -1.7
Z-SCORE OF LEFT VENTRICULAR DIMENSION IN END SYSTOLE: -0.75

## 2024-02-26 PROCEDURE — 93016 CV STRESS TEST SUPVJ ONLY: CPT | Mod: ,,, | Performed by: NURSE PRACTITIONER

## 2024-02-26 PROCEDURE — A9502 TC99M TETROFOSMIN: HCPCS | Performed by: INTERNAL MEDICINE

## 2024-02-26 PROCEDURE — 93018 CV STRESS TEST I&R ONLY: CPT | Mod: ,,, | Performed by: INTERNAL MEDICINE

## 2024-02-26 PROCEDURE — 93306 TTE W/DOPPLER COMPLETE: CPT | Mod: 26,,, | Performed by: INTERNAL MEDICINE

## 2024-02-26 PROCEDURE — 78452 HT MUSCLE IMAGE SPECT MULT: CPT | Mod: 26,,, | Performed by: INTERNAL MEDICINE

## 2024-02-26 PROCEDURE — 93306 TTE W/DOPPLER COMPLETE: CPT

## 2024-02-26 PROCEDURE — 78452 HT MUSCLE IMAGE SPECT MULT: CPT

## 2024-02-26 RX ADMIN — TETROFOSMIN 27.5 MILLICURIE: 1.38 INJECTION, POWDER, LYOPHILIZED, FOR SOLUTION INTRAVENOUS at 09:02

## 2024-02-26 RX ADMIN — TETROFOSMIN 12.6 MILLICURIE: 1.38 INJECTION, POWDER, LYOPHILIZED, FOR SOLUTION INTRAVENOUS at 07:02

## 2024-02-27 ENCOUNTER — PATIENT MESSAGE (OUTPATIENT)
Dept: CARDIOLOGY | Facility: CLINIC | Age: 54
End: 2024-02-27
Payer: COMMERCIAL

## 2024-03-05 ENCOUNTER — PATIENT MESSAGE (OUTPATIENT)
Dept: RHEUMATOLOGY | Facility: CLINIC | Age: 54
End: 2024-03-05
Payer: COMMERCIAL

## 2024-03-05 DIAGNOSIS — L40.50 PSORIATIC ARTHRITIS: ICD-10-CM

## 2024-03-05 DIAGNOSIS — K21.00 GERD WITH ESOPHAGITIS: ICD-10-CM

## 2024-03-05 DIAGNOSIS — M47.817 LUMBAR AND SACRAL OSTEOARTHRITIS: ICD-10-CM

## 2024-03-05 DIAGNOSIS — K51.919 ULCERATIVE COLITIS WITH COMPLICATION, UNSPECIFIED LOCATION: ICD-10-CM

## 2024-03-05 DIAGNOSIS — M06.00 SERONEGATIVE RHEUMATOID ARTHRITIS: ICD-10-CM

## 2024-03-06 RX ORDER — CLONAZEPAM 0.5 MG/1
0.5 TABLET ORAL 2 TIMES DAILY PRN
Qty: 60 TABLET | Refills: 0 | Status: SHIPPED | OUTPATIENT
Start: 2024-03-06 | End: 2024-05-03 | Stop reason: SDUPTHER

## 2024-04-08 ENCOUNTER — PATIENT MESSAGE (OUTPATIENT)
Dept: RHEUMATOLOGY | Facility: CLINIC | Age: 54
End: 2024-04-08
Payer: COMMERCIAL

## 2024-04-08 DIAGNOSIS — L40.50 PSORIATIC ARTHRITIS: ICD-10-CM

## 2024-04-08 DIAGNOSIS — E78.5 HYPERLIPIDEMIA, UNSPECIFIED HYPERLIPIDEMIA TYPE: ICD-10-CM

## 2024-04-08 DIAGNOSIS — G47.00 INSOMNIA, UNSPECIFIED TYPE: ICD-10-CM

## 2024-04-08 DIAGNOSIS — E66.3 OVERWEIGHT WITH BODY MASS INDEX (BMI) OF 27 TO 27.9 IN ADULT: Primary | ICD-10-CM

## 2024-04-08 RX ORDER — TIRZEPATIDE 2.5 MG/.5ML
INJECTION, SOLUTION SUBCUTANEOUS
Status: CANCELLED | OUTPATIENT
Start: 2024-04-08

## 2024-04-09 RX ORDER — TRAZODONE HYDROCHLORIDE 100 MG/1
100 TABLET ORAL NIGHTLY
Qty: 90 TABLET | Refills: 3 | Status: SHIPPED | OUTPATIENT
Start: 2024-04-09

## 2024-04-09 RX ORDER — TIRZEPATIDE 7.5 MG/.5ML
7.5 INJECTION, SOLUTION SUBCUTANEOUS
Qty: 12 PEN | Refills: 5 | Status: SHIPPED | OUTPATIENT
Start: 2024-04-09

## 2024-04-09 NOTE — TELEPHONE ENCOUNTER
Pt has been on semaglutide 1 mg weekly with another clinic, requesting trial of zepbound 7.5 mg for weight loss. Med sent to pharmacy.

## 2024-05-03 ENCOUNTER — OFFICE VISIT (OUTPATIENT)
Dept: RHEUMATOLOGY | Facility: CLINIC | Age: 54
End: 2024-05-03
Payer: COMMERCIAL

## 2024-05-03 DIAGNOSIS — G89.4 CHRONIC PAIN SYNDROME: ICD-10-CM

## 2024-05-03 DIAGNOSIS — L40.50 PSORIATIC ARTHRITIS: Primary | ICD-10-CM

## 2024-05-03 DIAGNOSIS — M54.2 NECK PAIN: ICD-10-CM

## 2024-05-03 DIAGNOSIS — K51.919 ULCERATIVE COLITIS WITH COMPLICATION, UNSPECIFIED LOCATION: ICD-10-CM

## 2024-05-03 DIAGNOSIS — M47.817 LUMBAR AND SACRAL OSTEOARTHRITIS: ICD-10-CM

## 2024-05-03 DIAGNOSIS — M06.00 SERONEGATIVE RHEUMATOID ARTHRITIS: ICD-10-CM

## 2024-05-03 PROCEDURE — 96372 THER/PROPH/DIAG INJ SC/IM: CPT | Mod: NDTC,,, | Performed by: INTERNAL MEDICINE

## 2024-05-03 PROCEDURE — 99214 OFFICE O/P EST MOD 30 MIN: CPT | Mod: 95,25,, | Performed by: INTERNAL MEDICINE

## 2024-05-03 PROCEDURE — 1159F MED LIST DOCD IN RCRD: CPT | Mod: CPTII,95,, | Performed by: INTERNAL MEDICINE

## 2024-05-03 PROCEDURE — 1160F RVW MEDS BY RX/DR IN RCRD: CPT | Mod: CPTII,95,, | Performed by: INTERNAL MEDICINE

## 2024-05-03 PROCEDURE — 3044F HG A1C LEVEL LT 7.0%: CPT | Mod: CPTII,95,, | Performed by: INTERNAL MEDICINE

## 2024-05-03 RX ORDER — HYDROCODONE BITARTRATE AND ACETAMINOPHEN 7.5; 325 MG/1; MG/1
1 TABLET ORAL EVERY 8 HOURS PRN
Qty: 90 TABLET | Refills: 0 | Status: SHIPPED | OUTPATIENT
Start: 2024-07-01 | End: 2024-07-31

## 2024-05-03 RX ORDER — METHYLPREDNISOLONE ACETATE 80 MG/ML
160 INJECTION, SUSPENSION INTRA-ARTICULAR; INTRALESIONAL; INTRAMUSCULAR; SOFT TISSUE
Status: COMPLETED | OUTPATIENT
Start: 2024-05-03 | End: 2024-05-03

## 2024-05-03 RX ORDER — HYDROCODONE BITARTRATE AND ACETAMINOPHEN 7.5; 325 MG/1; MG/1
1 TABLET ORAL EVERY 8 HOURS PRN
Qty: 90 TABLET | Refills: 0 | Status: SHIPPED | OUTPATIENT
Start: 2024-05-03

## 2024-05-03 RX ORDER — HYDROCODONE BITARTRATE AND ACETAMINOPHEN 7.5; 325 MG/1; MG/1
1 TABLET ORAL EVERY 8 HOURS PRN
Qty: 90 TABLET | Refills: 0 | Status: SHIPPED | OUTPATIENT
Start: 2024-06-02 | End: 2024-07-02

## 2024-05-03 RX ORDER — CYANOCOBALAMIN 1000 UG/ML
1000 INJECTION, SOLUTION INTRAMUSCULAR; SUBCUTANEOUS
Status: COMPLETED | OUTPATIENT
Start: 2024-05-03 | End: 2024-05-03

## 2024-05-03 RX ORDER — CLONAZEPAM 0.5 MG/1
0.5 TABLET ORAL 2 TIMES DAILY PRN
Qty: 60 TABLET | Refills: 3 | Status: SHIPPED | OUTPATIENT
Start: 2024-05-03

## 2024-05-03 RX ORDER — KETOROLAC TROMETHAMINE 30 MG/ML
60 INJECTION, SOLUTION INTRAMUSCULAR; INTRAVENOUS
Status: COMPLETED | OUTPATIENT
Start: 2024-05-03 | End: 2024-05-03

## 2024-05-03 RX ADMIN — CYANOCOBALAMIN 1000 MCG: 1000 INJECTION, SOLUTION INTRAMUSCULAR; SUBCUTANEOUS at 11:05

## 2024-05-03 RX ADMIN — KETOROLAC TROMETHAMINE 60 MG: 30 INJECTION, SOLUTION INTRAMUSCULAR; INTRAVENOUS at 11:05

## 2024-05-03 RX ADMIN — METHYLPREDNISOLONE ACETATE 160 MG: 80 INJECTION, SUSPENSION INTRA-ARTICULAR; INTRALESIONAL; INTRAMUSCULAR; SOFT TISSUE at 11:05

## 2024-05-03 NOTE — PROGRESS NOTES
Subjective:     Patient ID:  Osiris Billingsley    Chief Complaint:  Disease Management     History of Present Illness:  Pt is a 53 y.o. female who presents to clinic for follow up on rheumatoid arthritis, psoriatic arthritis, and UC. She is doing fair overall on Humira. She was off treatment for 1 month recently due to hold up with [facility] delivery. She is having arthritis flare in her knees, hips, and wrists. Ozempic was denied by insurance without diabetes diagnosis, but she was doing well on this earlier last year. Weight has increased significantly since her last visit.       Tizanidine helps with neck/headache pain. Taking ibuprofen PRN for pain.     Current treatment:  1. Humira  2. Norco     Prior treatment:  Humira  Enbrel       Rheumatologic History:   - Diagnosis/es:  - Positive serologies:  - Infectious screening labs:  - Previous Treatments:  - Current Treatments:     Interval History:   Hospitalization since last office visit: No    Patient Active Problem List    Diagnosis Date Noted    Psoriatic arthritis 2019    Ulcerative colitis with complication 2019    Uveitis 2019    Seronegative rheumatoid arthritis 2019     Past Surgical History:   Procedure Laterality Date     SECTION       Social History     Tobacco Use    Smoking status: Never    Smokeless tobacco: Never   Substance Use Topics    Alcohol use: No    Drug use: No     No family history on file.  Review of patient's allergies indicates:   Allergen Reactions    Xeljanz [tofacitinib] Other (See Comments)     Elevated lipids    Decadron [dexamethasone]      Insomnia      Enbrel [etanercept] Hives    Fremanezumab-vfrm     Sulfa (sulfonamide antibiotics)        Review of Systems   Review of Systems   Constitutional:  Positive for activity change. Negative for fever and unexpected weight change.   HENT:  Positive for trouble swallowing. Negative for mouth sores.    Eyes:  Positive for redness.   Respiratory:  Positive  for cough. Negative for shortness of breath.    Cardiovascular:  Positive for chest pain.   Gastrointestinal:  Positive for diarrhea. Negative for constipation.   Genitourinary:  Negative for dysuria and genital sores.   Skin:  Negative for rash.   Neurological:  Positive for headaches.   Hematological:  Bruises/bleeds easily.        Current Medications:  Current Outpatient Medications   Medication Instructions    acyclovir 5% (ZOVIRAX) 5 % ointment No dose, route, or frequency recorded.    clonazePAM (KLONOPIN) 0.5 mg, Oral, 2 times daily PRN    EScitalopram oxalate (LEXAPRO) 20 mg, Oral, Daily    famotidine (PEPCID) 20 mg, Oral, 2 times daily    HUMIRA(CF) PEN 40 mg, Subcutaneous, Every 14 days    HYDROcodone-acetaminophen (NORCO) 7.5-325 mg per tablet 1 tablet, Oral, Every 8 hours PRN    [START ON 6/2/2024] HYDROcodone-acetaminophen (NORCO) 7.5-325 mg per tablet 1 tablet, Oral, Every 8 hours PRN    [START ON 7/1/2024] HYDROcodone-acetaminophen (NORCO) 7.5-325 mg per tablet 1 tablet, Oral, Every 8 hours PRN    ibuprofen (ADVIL,MOTRIN) 800 mg, Oral, 3 times daily PRN    LIDOcaine-prilocaine (EMLA) cream Topical (Top), As needed (PRN)    lubiprostone (AMITIZA) 8 mcg, Oral, 2 times daily with meals    omeprazole (PRILOSEC) 40 mg, Oral, Every morning    progesterone (PROMETRIUM) 200 mg, Oral, Nightly    rizatriptan (MAXALT) 10 mg, Oral, As needed (PRN)    sumatriptan (IMITREX) 100 mg, Oral    tiZANidine (ZANAFLEX) 4 mg, Oral, Every 8 hours PRN    traZODone (DESYREL) 100 mg, Oral, Nightly    valACYclovir (VALTREX) 1,000 mg, Oral, Every 12 hours    ZEPBOUND 7.5 mg, Subcutaneous, Every 7 days           Objective:     There were no vitals filed for this visit.   There is no height or weight on file to calculate BMI.     Physical Examinations:  Physical Exam   Constitutional: She is oriented to person, place, and time.   Neurological: She is alert and oriented to person, place, and time.   Psychiatric: Mood, affect and  judgment normal.        Disease Assessment Scores:  Patient's Global Assessment of arthritis (0-10): 4  Physician's Global Assessment of arthritis (0-10): 3  Number of Tender Joints (0-28): 3  Number of Swollen Joints (0-28): 4        5/3/2024    10:32 AM   Rapid3 Question Responses and Scores   MDHAQ Score 1   Psychologic Score 5.5   Pain Score 7   When you awakened in the morning OVER THE LAST WEEK, did you feel stiff? Yes   If Yes, please indicate the number of hours until you are as limber as you will be for the day 2   Fatigue Score 10   Global Health Score 8   RAPID3 Score 6.11       Monitoring Lab Results:  Lab Results   Component Value Date    WBC 6.10 10/05/2023    RBC 4.42 10/05/2023    HGB 13.3 10/05/2023    HCT 41.2 10/05/2023    MCV 93 10/05/2023    MCH 30.1 10/05/2023    MCHC 32.3 10/05/2023    RDW 12.7 10/05/2023     10/05/2023        Lab Results   Component Value Date     10/05/2023    K 4.6 10/05/2023     10/05/2023    CO2 25 10/05/2023    GLU 88 10/05/2023    BUN 15 10/05/2023    CREATININE 0.8 10/05/2023    CALCIUM 9.4 10/05/2023    PROT 7.2 10/05/2023    ALBUMIN 4.0 10/05/2023    BILITOT 0.7 10/05/2023    ALKPHOS 58 10/05/2023    AST 15 10/05/2023    ALT 11 10/05/2023    ANIONGAP 8 10/05/2023    EGFRNORACEVR >60.0 10/05/2023       Lab Results   Component Value Date    SEDRATE 9 10/05/2023    CRP 0.4 10/05/2023        Lab Results   Component Value Date    GGZIANAG83IF 38 06/05/2023    BCXGGKFW80 588 05/01/2015        Lab Results   Component Value Date    CHOL 355 (H) 01/09/2024    HDL 78 (H) 01/09/2024    LDLCALC 246.4 (H) 01/09/2024    TRIG 153 (H) 01/09/2024       Lab Results   Component Value Date    RF <13.0 06/05/2023    CCPANTIBODIE <0.5 06/05/2023     Lab Results   Component Value Date    ANASCREEN Negative <1:80 06/05/2023    DSDNA Negative 1:10 07/14/2017     Lab Results   Component Value Date    HLABB27 Negative 01/09/2024       Infectious Disease Screening:  Lab  "Results   Component Value Date    HEPBSAG Non-reactive 10/05/2023    HEPBCAB Non-reactive 10/05/2023    HEPBIGM Negative 06/05/2018     Lab Results   Component Value Date    HEPCAB Non-reactive 10/05/2023     Lab Results   Component Value Date    TBGOLDPLUS Negative 10/05/2023     No results found for: "QUANTIFERON", "SVCMT", "QUANTAGVALUE", "QUANTNILVALU", "QUANTMITOGEN", "QFTTBAG", "QINT"     Imaging: DEXA, Xrays, MRIs, CTs, etc    Old & Outside Medical Records:  Reviewed old and all outside medical records available in Care Everywhere     Assessment:     Encounter Diagnoses   Name Primary?    Psoriatic arthritis Yes    Seronegative rheumatoid arthritis     Chronic pain syndrome     Neck pain     Ulcerative colitis with complication, unspecified location     GERD with esophagitis     Lumbar and sacral osteoarthritis        Plan:      Encounter Diagnoses   Name Primary?    Psoriatic arthritis Yes    Seronegative rheumatoid arthritis     Chronic pain syndrome     Neck pain     Ulcerative colitis with complication, unspecified location     GERD with esophagitis     Lumbar and sacral osteoarthritis    Diagnoses and all orders for this visit:    Psoriatic arthritis  -     cyanocobalamin injection 1,000 mcg  -     ketorolac injection 60 mg  -     methylPREDNISolone acetate injection 160 mg  -     X-Ray Hips Bilateral [employer] AP Pelvis; Future  -     HYDROcodone-acetaminophen (NORCO) 7.5-325 mg per tablet; Take 1 tablet by mouth every 8 (eight) hours as needed for Pain.  -     HYDROcodone-acetaminophen (NORCO) 7.5-325 mg per tablet; Take 1 tablet by mouth every 8 (eight) hours as needed for Pain.  -     HYDROcodone-acetaminophen (NORCO) 7.5-325 mg per tablet; Take 1 tablet by mouth every 8 (eight) hours as needed for Pain.  -     clonazePAM (KLONOPIN) 0.5 MG tablet; Take 1 tablet (0.5 mg total) by mouth 2 (two) times daily as needed for Anxiety.    Seronegative rheumatoid arthritis  -     cyanocobalamin injection " 1,000 mcg  -     ketorolac injection 60 mg  -     methylPREDNISolone acetate injection 160 mg  -     X-Ray Hips Bilateral [employer] AP Pelvis; Future  -     clonazePAM (KLONOPIN) 0.5 MG tablet; Take 1 tablet (0.5 mg total) by mouth 2 (two) times daily as needed for Anxiety.    Chronic pain syndrome  -     cyanocobalamin injection 1,000 mcg  -     ketorolac injection 60 mg  -     methylPREDNISolone acetate injection 160 mg  -     X-Ray Hips Bilateral [employer] AP Pelvis; Future  -     HYDROcodone-acetaminophen (NORCO) 7.5-325 mg per tablet; Take 1 tablet by mouth every 8 (eight) hours as needed for Pain.  -     HYDROcodone-acetaminophen (NORCO) 7.5-325 mg per tablet; Take 1 tablet by mouth every 8 (eight) hours as needed for Pain.  -     HYDROcodone-acetaminophen (NORCO) 7.5-325 mg per tablet; Take 1 tablet by mouth every 8 (eight) hours as needed for Pain.    Neck pain  -     cyanocobalamin injection 1,000 mcg  -     ketorolac injection 60 mg  -     methylPREDNISolone acetate injection 160 mg  -     X-Ray Hips Bilateral [employer] AP Pelvis; Future    Ulcerative colitis with complication, unspecified location  -     X-Ray Hips Bilateral [employer] AP Pelvis; Future  -     clonazePAM (KLONOPIN) 0.5 MG tablet; Take 1 tablet (0.5 mg total) by mouth 2 (two) times daily as needed for Anxiety.    GERD with esophagitis  -     X-Ray Hips Bilateral [employer] AP Pelvis; Future  -     clonazePAM (KLONOPIN) 0.5 MG tablet; Take 1 tablet (0.5 mg total) by mouth 2 (two) times daily as needed for Anxiety.    Lumbar and sacral osteoarthritis  -     X-Ray Hips Bilateral 2 [employer] AP Pelvis; Future  -     clonazePAM (KLONOPIN) 0.5 MG tablet; Take 1 tablet (0.5 mg total) by mouth 2 (two) times daily as needed for Anxiety.          1. Refills meds  2. Labs ordered  3. Xray ordered     Follow-up 4 months  The patient location is: home  The chief complaint leading to consultation is: psa    Visit type: audiovisual    Face to Face time  with patient: 30   minutes of total time spent on the encounter, which includes face to face time and non-face to face time preparing to see the patient (eg, review of tests), Obtaining and/or reviewing separately obtained history, Documenting clinical information in the electronic or other health record, Independently interpreting results (not separately reported) and communicating results to the patient/family/caregiver, or Care coordination (not separately reported).         Each patient to whom he or she provides medical services by telemedicine is:  (1) informed of the relationship between the physician and patient and the respective role of any other health care provider with respect to management of the patient; and (2) notified that he or she may decline to receive medical services by telemedicine and may withdraw from such care at any time.    Notes:

## 2024-05-09 DIAGNOSIS — L40.50 PSORIATIC ARTHRITIS: ICD-10-CM

## 2024-05-09 DIAGNOSIS — M06.00 SERONEGATIVE RHEUMATOID ARTHRITIS: ICD-10-CM

## 2024-05-09 RX ORDER — ADALIMUMAB 40MG/0.4ML
40 KIT SUBCUTANEOUS
Qty: 2 PEN | Refills: 11 | Status: ACTIVE | OUTPATIENT
Start: 2024-05-09

## 2024-05-09 NOTE — TELEPHONE ENCOUNTER
Pharmacy requesting refill on Humira 40mg  Pt's LOV 05/03/2024  Pt's NOV 09/13/2024  Medication pending  Needs PA pending to [facility]

## 2024-07-15 ENCOUNTER — PATIENT MESSAGE (OUTPATIENT)
Dept: RHEUMATOLOGY | Facility: CLINIC | Age: 54
End: 2024-07-15
Payer: COMMERCIAL

## 2024-07-15 DIAGNOSIS — J32.9 SINUSITIS, UNSPECIFIED CHRONICITY, UNSPECIFIED LOCATION: Primary | ICD-10-CM

## 2024-07-16 RX ORDER — METHYLPREDNISOLONE 4 MG/1
TABLET ORAL
Qty: 21 EACH | Refills: 0 | Status: SHIPPED | OUTPATIENT
Start: 2024-07-16 | End: 2024-08-06

## 2024-07-16 RX ORDER — AZITHROMYCIN 250 MG/1
TABLET, FILM COATED ORAL
Qty: 6 TABLET | Refills: 0 | Status: SHIPPED | OUTPATIENT
Start: 2024-07-16 | End: 2024-07-21

## 2024-07-16 NOTE — TELEPHONE ENCOUNTER
Call pt --she could have an infection and should seek evaluation with urgent care if out of town. Not likely related to psoriatic arthritis, but she is immunocompromised because of Humira.

## 2024-08-02 ENCOUNTER — PATIENT MESSAGE (OUTPATIENT)
Dept: RHEUMATOLOGY | Facility: CLINIC | Age: 54
End: 2024-08-02
Payer: COMMERCIAL

## 2024-08-02 DIAGNOSIS — E78.5 HYPERLIPIDEMIA, UNSPECIFIED HYPERLIPIDEMIA TYPE: Primary | ICD-10-CM

## 2024-08-02 DIAGNOSIS — R73.9 HYPERGLYCEMIA: ICD-10-CM

## 2024-08-08 NOTE — TELEPHONE ENCOUNTER
Ok to change to bio similar from our standpoint  I ordered labs requesting including cholesterol and A1c--labs should be drawn fasting

## 2024-08-12 ENCOUNTER — LAB VISIT (OUTPATIENT)
Dept: LAB | Facility: HOSPITAL | Age: 54
End: 2024-08-12
Attending: PHYSICIAN ASSISTANT
Payer: COMMERCIAL

## 2024-08-12 DIAGNOSIS — E78.5 HYPERLIPIDEMIA, UNSPECIFIED HYPERLIPIDEMIA TYPE: ICD-10-CM

## 2024-08-12 DIAGNOSIS — R73.9 HYPERGLYCEMIA: ICD-10-CM

## 2024-08-12 LAB
ALBUMIN SERPL BCP-MCNC: 3.9 G/DL (ref 3.5–5.2)
ALP SERPL-CCNC: 67 U/L (ref 55–135)
ALT SERPL W/O P-5'-P-CCNC: 14 U/L (ref 10–44)
ANION GAP SERPL CALC-SCNC: 9 MMOL/L (ref 8–16)
AST SERPL-CCNC: 15 U/L (ref 10–40)
BASOPHILS # BLD AUTO: 0.04 K/UL (ref 0–0.2)
BASOPHILS NFR BLD: 0.7 % (ref 0–1.9)
BILIRUB SERPL-MCNC: 0.5 MG/DL (ref 0.1–1)
BUN SERPL-MCNC: 12 MG/DL (ref 6–20)
CALCIUM SERPL-MCNC: 9.9 MG/DL (ref 8.7–10.5)
CHLORIDE SERPL-SCNC: 104 MMOL/L (ref 95–110)
CHOLEST SERPL-MCNC: 329 MG/DL (ref 120–199)
CHOLEST/HDLC SERPL: 5.9 {RATIO} (ref 2–5)
CO2 SERPL-SCNC: 26 MMOL/L (ref 23–29)
CREAT SERPL-MCNC: 0.8 MG/DL (ref 0.5–1.4)
CRP SERPL-MCNC: 0.5 MG/L (ref 0–8.2)
DIFFERENTIAL METHOD BLD: ABNORMAL
EOSINOPHIL # BLD AUTO: 0.3 K/UL (ref 0–0.5)
EOSINOPHIL NFR BLD: 5.1 % (ref 0–8)
ERYTHROCYTE [DISTWIDTH] IN BLOOD BY AUTOMATED COUNT: 13.1 % (ref 11.5–14.5)
ERYTHROCYTE [SEDIMENTATION RATE] IN BLOOD BY WESTERGREN METHOD: 14 MM/HR (ref 0–20)
EST. GFR  (NO RACE VARIABLE): >60 ML/MIN/1.73 M^2
ESTIMATED AVG GLUCOSE: 108 MG/DL (ref 68–131)
GLUCOSE SERPL-MCNC: 74 MG/DL (ref 70–110)
HBA1C MFR BLD: 5.4 % (ref 4–5.6)
HCT VFR BLD AUTO: 40.9 % (ref 37–48.5)
HDLC SERPL-MCNC: 56 MG/DL (ref 40–75)
HDLC SERPL: 17 % (ref 20–50)
HGB BLD-MCNC: 12.8 G/DL (ref 12–16)
IMM GRANULOCYTES # BLD AUTO: 0.02 K/UL (ref 0–0.04)
IMM GRANULOCYTES NFR BLD AUTO: 0.3 % (ref 0–0.5)
LDLC SERPL CALC-MCNC: 214 MG/DL (ref 63–159)
LYMPHOCYTES # BLD AUTO: 2.5 K/UL (ref 1–4.8)
LYMPHOCYTES NFR BLD: 42.2 % (ref 18–48)
MCH RBC QN AUTO: 30.5 PG (ref 27–31)
MCHC RBC AUTO-ENTMCNC: 31.3 G/DL (ref 32–36)
MCV RBC AUTO: 98 FL (ref 82–98)
MONOCYTES # BLD AUTO: 0.5 K/UL (ref 0.3–1)
MONOCYTES NFR BLD: 8.4 % (ref 4–15)
NEUTROPHILS # BLD AUTO: 2.5 K/UL (ref 1.8–7.7)
NEUTROPHILS NFR BLD: 43.3 % (ref 38–73)
NONHDLC SERPL-MCNC: 273 MG/DL
NRBC BLD-RTO: 0 /100 WBC
PLATELET # BLD AUTO: 268 K/UL (ref 150–450)
PMV BLD AUTO: 11.5 FL (ref 9.2–12.9)
POTASSIUM SERPL-SCNC: 4 MMOL/L (ref 3.5–5.1)
PROT SERPL-MCNC: 7.3 G/DL (ref 6–8.4)
RBC # BLD AUTO: 4.19 M/UL (ref 4–5.4)
SODIUM SERPL-SCNC: 139 MMOL/L (ref 136–145)
TRIGL SERPL-MCNC: 295 MG/DL (ref 30–150)
WBC # BLD AUTO: 5.86 K/UL (ref 3.9–12.7)

## 2024-08-12 PROCEDURE — 36415 COLL VENOUS BLD VENIPUNCTURE: CPT | Mod: PO | Performed by: PHYSICIAN ASSISTANT

## 2024-08-12 PROCEDURE — 86140 C-REACTIVE PROTEIN: CPT | Performed by: PHYSICIAN ASSISTANT

## 2024-08-12 PROCEDURE — 85025 COMPLETE CBC W/AUTO DIFF WBC: CPT | Performed by: PHYSICIAN ASSISTANT

## 2024-08-12 PROCEDURE — 83036 HEMOGLOBIN GLYCOSYLATED A1C: CPT | Performed by: PHYSICIAN ASSISTANT

## 2024-08-12 PROCEDURE — 80053 COMPREHEN METABOLIC PANEL: CPT | Performed by: PHYSICIAN ASSISTANT

## 2024-08-12 PROCEDURE — 85651 RBC SED RATE NONAUTOMATED: CPT | Mod: PO | Performed by: PHYSICIAN ASSISTANT

## 2024-08-12 PROCEDURE — 80061 LIPID PANEL: CPT | Performed by: PHYSICIAN ASSISTANT

## 2024-08-20 DIAGNOSIS — F32.9 MAJOR DEPRESSION, CHRONIC: ICD-10-CM

## 2024-08-20 RX ORDER — ESCITALOPRAM OXALATE 20 MG/1
20 TABLET ORAL DAILY
Qty: 90 TABLET | Refills: 3 | Status: SHIPPED | OUTPATIENT
Start: 2024-08-20 | End: 2025-08-20

## 2024-08-20 NOTE — TELEPHONE ENCOUNTER
Pharmacy requesting refill on Escitalopram 20mg  Pt's LOV 05/03/2024  Pt's NOV 09/13/2024  Medication pending

## 2024-08-27 ENCOUNTER — PATIENT MESSAGE (OUTPATIENT)
Dept: RHEUMATOLOGY | Facility: CLINIC | Age: 54
End: 2024-08-27
Payer: COMMERCIAL

## 2024-09-13 ENCOUNTER — OFFICE VISIT (OUTPATIENT)
Dept: RHEUMATOLOGY | Facility: CLINIC | Age: 54
End: 2024-09-13
Payer: COMMERCIAL

## 2024-09-13 VITALS
BODY MASS INDEX: 24.66 KG/M2 | WEIGHT: 134 LBS | HEART RATE: 81 BPM | DIASTOLIC BLOOD PRESSURE: 84 MMHG | HEIGHT: 62 IN | SYSTOLIC BLOOD PRESSURE: 128 MMHG

## 2024-09-13 DIAGNOSIS — G89.4 CHRONIC PAIN SYNDROME: ICD-10-CM

## 2024-09-13 DIAGNOSIS — Z79.899 IMMUNOCOMPROMISED STATE DUE TO DRUG THERAPY: ICD-10-CM

## 2024-09-13 DIAGNOSIS — L40.50 PSORIATIC ARTHRITIS: ICD-10-CM

## 2024-09-13 DIAGNOSIS — M06.00 SERONEGATIVE RHEUMATOID ARTHRITIS: Primary | ICD-10-CM

## 2024-09-13 DIAGNOSIS — D84.821 IMMUNOCOMPROMISED STATE DUE TO DRUG THERAPY: ICD-10-CM

## 2024-09-13 DIAGNOSIS — M54.2 NECK PAIN: ICD-10-CM

## 2024-09-13 PROCEDURE — 99999 PR PBB SHADOW E&M-EST. PATIENT-LVL IV: CPT | Mod: PBBFAC,,, | Performed by: PHYSICIAN ASSISTANT

## 2024-09-13 RX ORDER — CYANOCOBALAMIN 1000 UG/ML
1000 INJECTION, SOLUTION INTRAMUSCULAR; SUBCUTANEOUS
Status: COMPLETED | OUTPATIENT
Start: 2024-09-13 | End: 2024-09-13

## 2024-09-13 RX ORDER — LISDEXAMFETAMINE DIMESYLATE 40 MG/1
40 CAPSULE ORAL DAILY
COMMUNITY
Start: 2024-08-15

## 2024-09-13 RX ORDER — METHYLPREDNISOLONE ACETATE 80 MG/ML
160 INJECTION, SUSPENSION INTRA-ARTICULAR; INTRALESIONAL; INTRAMUSCULAR; SOFT TISSUE
Status: COMPLETED | OUTPATIENT
Start: 2024-09-13 | End: 2024-09-13

## 2024-09-13 RX ORDER — IBUPROFEN 800 MG/1
800 TABLET ORAL 3 TIMES DAILY PRN
Qty: 90 TABLET | Refills: 3 | Status: SHIPPED | OUTPATIENT
Start: 2024-09-13

## 2024-09-13 RX ORDER — ATORVASTATIN CALCIUM 20 MG/1
20 TABLET, FILM COATED ORAL DAILY
COMMUNITY
Start: 2024-08-15

## 2024-09-13 RX ORDER — TIZANIDINE 4 MG/1
4 TABLET ORAL EVERY 8 HOURS PRN
Qty: 90 TABLET | Refills: 3 | Status: SHIPPED | OUTPATIENT
Start: 2024-09-13

## 2024-09-13 RX ORDER — KETOROLAC TROMETHAMINE 30 MG/ML
60 INJECTION, SOLUTION INTRAMUSCULAR; INTRAVENOUS
Status: COMPLETED | OUTPATIENT
Start: 2024-09-13 | End: 2024-09-13

## 2024-09-13 RX ADMIN — KETOROLAC TROMETHAMINE 60 MG: 30 INJECTION, SOLUTION INTRAMUSCULAR; INTRAVENOUS at 02:09

## 2024-09-13 RX ADMIN — CYANOCOBALAMIN 1000 MCG: 1000 INJECTION, SOLUTION INTRAMUSCULAR; SUBCUTANEOUS at 02:09

## 2024-09-13 RX ADMIN — METHYLPREDNISOLONE ACETATE 160 MG: 80 INJECTION, SUSPENSION INTRA-ARTICULAR; INTRALESIONAL; INTRAMUSCULAR; SOFT TISSUE at 02:09

## 2024-09-13 ASSESSMENT — ROUTINE ASSESSMENT OF PATIENT INDEX DATA (RAPID3)
PSYCHOLOGICAL DISTRESS SCORE: 3.3
FATIGUE SCORE: 1.1
MDHAQ FUNCTION SCORE: 1
TOTAL RAPID3 SCORE: 4.94
PAIN SCORE: 6.5
PATIENT GLOBAL ASSESSMENT SCORE: 5

## 2024-09-13 NOTE — PROGRESS NOTES
Subjective:       Patient ID: Osiris Billingsley is a 53 y.o. female.    Chief Complaint: Disease Management      Mrs. Billingsley is a 53 year old female who presents to clinic for follow up on rheumatoid arthritis, psoriatic arthritis, and UC. She is doing fair overall on Humira. She has not been compliant with treatment. She had covid infection 1-2 weeks ago. She reports pain in her R 1st IP and DIP and 2nd/3rd MCPs on the L hand which is constant and aching.  She reports severe R posterior hip pain that is worse with hip flexion and external rotation. X-ray ordered, but not yet completed.     She is taking norco per for severe pain. Tizanidine helps with neck/headache pain. Taking ibuprofen PRN for pain.    She continues to have easy bruising.     PCP added lipitor for HLP.    Current treatment:  1. Humira  2. Norco    Prior treatment:  Humira  Enbrel    Review of Systems   Constitutional: Positive for activity change. Negative for appetite change, chills, fatigue and fever.   Eyes: Negative for visual disturbance.   Respiratory: Negative for cough.    Cardiovascular: Negative for chest pain.   Gastrointestinal: Negative for abdominal pain and constipation.   Musculoskeletal: Positive for arthralgias and joint swelling.   Skin: Negative for rash.   Allergic/Immunologic: Positive for immunocompromised state.   Neurological: Negative for dizziness, weakness and light-headedness.   Psychiatric/Behavioral: Negative for anxiety.         Objective:     Vitals:    24 1349   BP: 128/84   Pulse: 81         Past Medical History:   Diagnosis Date    Colitis     Dry eyes     Dry mouth     Neuromuscular disorder     Premature menopause 1999    Rheumatoid arthritis      Past Surgical History:   Procedure Laterality Date     SECTION            Physical Exam   Constitutional: She is well-developed, well-nourished, and in no distress.   Eyes: Right conjunctiva is not injected. Left conjunctiva is not injected.    Cardiovascular: Normal rate and regular rhythm.  Exam reveals no decreased pulses.    Pulmonary/Chest: Effort normal.  Neurological: Gait normal.   Skin: No rash noted.   Psychiatric: Affect normal.   Musculoskeletal: Tenderness and edema present.   R posterior HIP TTP , pain with external rotation hip  Synovitis MCPs, PIPs       Component      Latest Ref Rng 8/12/2024   WBC      3.90 - 12.70 K/uL 5.86    RBC      4.00 - 5.40 M/uL 4.19    Hemoglobin      12.0 - 16.0 g/dL 12.8    Hematocrit      37.0 - 48.5 % 40.9    MCV      82 - 98 fL 98    MCH      27.0 - 31.0 pg 30.5    MCHC      32.0 - 36.0 g/dL 31.3 (L)    RDW      11.5 - 14.5 % 13.1    Platelet Count      150 - 450 K/uL 268    MPV      9.2 - 12.9 fL 11.5    Immature Granulocytes      0.0 - 0.5 % 0.3    Gran # (ANC)      1.8 - 7.7 K/uL 2.5    Immature Grans (Abs)      0.00 - 0.04 K/uL 0.02    Lymph #      1.0 - 4.8 K/uL 2.5    Mono #      0.3 - 1.0 K/uL 0.5    Eos #      0.0 - 0.5 K/uL 0.3    Baso #      0.00 - 0.20 K/uL 0.04    nRBC      0 /100 WBC 0    Gran %      38.0 - 73.0 % 43.3    Lymph %      18.0 - 48.0 % 42.2    Mono %      4.0 - 15.0 % 8.4    Eos %      0.0 - 8.0 % 5.1    Basophil %      0.0 - 1.9 % 0.7    Differential Method Automated    Sodium      136 - 145 mmol/L 139    Potassium      3.5 - 5.1 mmol/L 4.0    Chloride      95 - 110 mmol/L 104    CO2      23 - 29 mmol/L 26    Glucose      70 - 110 mg/dL 74    BUN      6 - 20 mg/dL 12    Creatinine      0.5 - 1.4 mg/dL 0.8    Calcium      8.7 - 10.5 mg/dL 9.9    PROTEIN TOTAL      6.0 - 8.4 g/dL 7.3    Albumin      3.5 - 5.2 g/dL 3.9    BILIRUBIN TOTAL      0.1 - 1.0 mg/dL 0.5    ALP      55 - 135 U/L 67    AST      10 - 40 U/L 15    ALT      10 - 44 U/L 14    eGFR      >60 mL/min/1.73 m^2 >60.0    Anion Gap      8 - 16 mmol/L 9    Cholesterol Total      120 - 199 mg/dL 329 (H)    Triglycerides      30 - 150 mg/dL 295 (H)    HDL      40 - 75 mg/dL 56    LDL Cholesterol      63.0 - 159.0 mg/dL  214.0 (H)    HDL/Cholesterol Ratio      20.0 - 50.0 % 17.0 (L)    Total Cholesterol/HDL Ratio      2.0 - 5.0  5.9 (H)    Non-HDL Cholesterol      mg/dL 273    Hemoglobin A1C External      4.0 - 5.6 % 5.4    Estimated Avg Glucose      68 - 131 mg/dL 108    CRP      0.0 - 8.2 mg/L 0.5    Sed Rate      0 - 20 mm/Hr 14         Assessment:       1. Seronegative rheumatoid arthritis    2. Psoriatic arthritis    3. Neck pain    4. Immunocompromised state due to drug therapy    5. Chronic pain syndrome          Plan:       Seronegative rheumatoid arthritis  -     ketorolac injection 60 mg  -     methylPREDNISolone acetate injection 160 mg  -     cyanocobalamin injection 1,000 mcg  -     CBC Auto Differential; Future; Expected date: 09/13/2024  -     Comprehensive Metabolic Panel; Future; Expected date: 09/13/2024  -     C-Reactive Protein; Future; Expected date: 09/13/2024  -     Sedimentation rate; Future; Expected date: 09/13/2024  -     tiZANidine (ZANAFLEX) 4 MG tablet; Take 1 tablet (4 mg total) by mouth every 8 (eight) hours as needed (neck pain).  Dispense: 90 tablet; Refill: 3  -     ibuprofen (ADVIL,MOTRIN) 800 MG tablet; Take 1 tablet (800 mg total) by mouth 3 (three) times daily as needed for Pain.  Dispense: 90 tablet; Refill: 3    Psoriatic arthritis  -     ketorolac injection 60 mg  -     methylPREDNISolone acetate injection 160 mg  -     cyanocobalamin injection 1,000 mcg  -     CBC Auto Differential; Future; Expected date: 09/13/2024  -     Comprehensive Metabolic Panel; Future; Expected date: 09/13/2024  -     C-Reactive Protein; Future; Expected date: 09/13/2024  -     Cancel: Sjogrens syndrome-A extractable nuclear antibody; Future; Expected date: 09/13/2024  -     Sedimentation rate; Future; Expected date: 09/13/2024  -     tiZANidine (ZANAFLEX) 4 MG tablet; Take 1 tablet (4 mg total) by mouth every 8 (eight) hours as needed (neck pain).  Dispense: 90 tablet; Refill: 3  -     ibuprofen (ADVIL,MOTRIN) 800  MG tablet; Take 1 tablet (800 mg total) by mouth 3 (three) times daily as needed for Pain.  Dispense: 90 tablet; Refill: 3    Neck pain  -     tiZANidine (ZANAFLEX) 4 MG tablet; Take 1 tablet (4 mg total) by mouth every 8 (eight) hours as needed (neck pain).  Dispense: 90 tablet; Refill: 3    Immunocompromised state due to drug therapy    Chronic pain syndrome          Seronegative rheumatoid arthritis, psoriatic arthritis, psoriasis, ulcerative colitis, and uveitis  --history of ARLIN 1:160 homogenous  --IBS w/constipation  --chronic pain syndrome  --hyperlipidemia    Plan:  1. Toradol 60, depo 160, b12 today  2. Cont humira, may consider adding HCQ  3. Cont ibuprofen prn, tizanidine prn  4. Cont norco prn.  I have checked [place] prescription monitoring program site and no unusual or abnormal behavior has occurred pt understand the risk and benefits of taking opioid medications and has decided to continue the medication. Pended for MD      Follow up:   4 mo Dr. Devi

## 2024-09-20 RX ORDER — HYDROCODONE BITARTRATE AND ACETAMINOPHEN 7.5; 325 MG/1; MG/1
1 TABLET ORAL EVERY 8 HOURS PRN
Qty: 90 TABLET | Refills: 0 | Status: SHIPPED | OUTPATIENT
Start: 2024-10-13

## 2024-09-20 RX ORDER — HYDROCODONE BITARTRATE AND ACETAMINOPHEN 7.5; 325 MG/1; MG/1
1 TABLET ORAL EVERY 8 HOURS PRN
Qty: 90 TABLET | Refills: 0 | Status: SHIPPED | OUTPATIENT
Start: 2024-09-20

## 2024-10-02 DIAGNOSIS — L40.50 PSORIATIC ARTHRITIS: ICD-10-CM

## 2024-10-02 DIAGNOSIS — M06.00 SERONEGATIVE RHEUMATOID ARTHRITIS: ICD-10-CM

## 2024-10-02 DIAGNOSIS — M54.2 NECK PAIN: ICD-10-CM

## 2024-10-02 RX ORDER — TIZANIDINE 4 MG/1
4 TABLET ORAL EVERY 8 HOURS PRN
Qty: 90 TABLET | Refills: 3 | Status: CANCELLED | OUTPATIENT
Start: 2024-10-02

## 2024-10-07 DIAGNOSIS — G47.00 INSOMNIA, UNSPECIFIED TYPE: ICD-10-CM

## 2024-10-07 RX ORDER — TRAZODONE HYDROCHLORIDE 100 MG/1
100 TABLET ORAL NIGHTLY
Qty: 90 TABLET | Refills: 3 | Status: SHIPPED | OUTPATIENT
Start: 2024-10-07

## 2024-10-07 NOTE — TELEPHONE ENCOUNTER
Pharmacy requesting refill on Trazodone 100mg  Pt's LOV 09/13/2024  Pt's NOV 01/03/2025  Medication pending

## 2024-10-14 ENCOUNTER — PATIENT MESSAGE (OUTPATIENT)
Dept: RHEUMATOLOGY | Facility: CLINIC | Age: 54
End: 2024-10-14
Payer: COMMERCIAL

## 2024-10-14 DIAGNOSIS — M54.2 NECK PAIN: ICD-10-CM

## 2024-10-14 DIAGNOSIS — M06.00 SERONEGATIVE RHEUMATOID ARTHRITIS: ICD-10-CM

## 2024-10-14 DIAGNOSIS — L40.50 PSORIATIC ARTHRITIS: ICD-10-CM

## 2024-10-17 RX ORDER — TIZANIDINE 4 MG/1
4 TABLET ORAL EVERY 8 HOURS PRN
Qty: 90 TABLET | Refills: 3 | Status: SHIPPED | OUTPATIENT
Start: 2024-10-17

## 2024-10-29 ENCOUNTER — HOSPITAL ENCOUNTER (OUTPATIENT)
Dept: RADIOLOGY | Facility: HOSPITAL | Age: 54
Discharge: HOME | End: 2024-10-29
Payer: COMMERCIAL

## 2024-10-29 ENCOUNTER — HOSPITAL ENCOUNTER (OUTPATIENT)
Dept: RADIOLOGY | Facility: HOSPITAL | Age: 54
Discharge: HOME | End: 2024-10-29
Attending: INTERNAL MEDICINE
Payer: COMMERCIAL

## 2024-10-29 VITALS — HEIGHT: 62 IN | BODY MASS INDEX: 24.66 KG/M2 | WEIGHT: 134 LBS

## 2024-10-29 DIAGNOSIS — G89.4 CHRONIC PAIN SYNDROME: ICD-10-CM

## 2024-10-29 DIAGNOSIS — L40.50 PSORIATIC ARTHRITIS: ICD-10-CM

## 2024-10-29 DIAGNOSIS — M54.2 NECK PAIN: ICD-10-CM

## 2024-10-29 DIAGNOSIS — K51.919 ULCERATIVE COLITIS WITH COMPLICATION, UNSPECIFIED LOCATION: ICD-10-CM

## 2024-10-29 DIAGNOSIS — M06.00 SERONEGATIVE RHEUMATOID ARTHRITIS: ICD-10-CM

## 2024-10-29 DIAGNOSIS — M47.817 LUMBAR AND SACRAL OSTEOARTHRITIS: ICD-10-CM

## 2024-10-29 DIAGNOSIS — Z12.31 ENCOUNTER FOR SCREENING MAMMOGRAM FOR BREAST CANCER: ICD-10-CM

## 2024-10-29 PROCEDURE — 73521 X-RAY EXAM HIPS BI 2 VIEWS: CPT | Mod: 26,,, | Performed by: RADIOLOGY

## 2024-10-29 PROCEDURE — 77067 SCR MAMMO BI INCL CAD: CPT | Mod: 26,,, | Performed by: RADIOLOGY

## 2024-10-29 PROCEDURE — 73521 X-RAY EXAM HIPS BI 2 VIEWS: CPT | Mod: TC,PO

## 2024-10-29 PROCEDURE — 77063 BREAST TOMOSYNTHESIS BI: CPT | Mod: 26,,, | Performed by: RADIOLOGY

## 2024-10-29 PROCEDURE — 77067 SCR MAMMO BI INCL CAD: CPT | Mod: TC,PO

## 2024-12-27 ENCOUNTER — PATIENT MESSAGE (OUTPATIENT)
Dept: RHEUMATOLOGY | Facility: CLINIC | Age: 54
End: 2024-12-27
Payer: COMMERCIAL

## 2024-12-27 ENCOUNTER — LAB VISIT (OUTPATIENT)
Dept: LAB | Facility: HOSPITAL | Age: 54
End: 2024-12-27
Payer: COMMERCIAL

## 2024-12-27 DIAGNOSIS — L40.50 PSORIATIC ARTHRITIS: ICD-10-CM

## 2024-12-27 DIAGNOSIS — M06.00 SERONEGATIVE RHEUMATOID ARTHRITIS: ICD-10-CM

## 2024-12-27 LAB
ALBUMIN SERPL BCP-MCNC: 3.9 G/DL (ref 3.5–5.2)
ALP SERPL-CCNC: 53 U/L (ref 40–150)
ALT SERPL W/O P-5'-P-CCNC: 15 U/L (ref 10–44)
ANION GAP SERPL CALC-SCNC: 6 MMOL/L (ref 8–16)
AST SERPL-CCNC: 14 U/L (ref 10–40)
BASOPHILS # BLD AUTO: 0.02 K/UL (ref 0–0.2)
BASOPHILS NFR BLD: 0.3 % (ref 0–1.9)
BILIRUB SERPL-MCNC: 0.5 MG/DL (ref 0.1–1)
BUN SERPL-MCNC: 13 MG/DL (ref 6–20)
CALCIUM SERPL-MCNC: 9.4 MG/DL (ref 8.7–10.5)
CHLORIDE SERPL-SCNC: 106 MMOL/L (ref 95–110)
CO2 SERPL-SCNC: 28 MMOL/L (ref 23–29)
CREAT SERPL-MCNC: 0.8 MG/DL (ref 0.5–1.4)
CRP SERPL-MCNC: 0.4 MG/L (ref 0–8.2)
DIFFERENTIAL METHOD BLD: ABNORMAL
EOSINOPHIL # BLD AUTO: 0.2 K/UL (ref 0–0.5)
EOSINOPHIL NFR BLD: 2.9 % (ref 0–8)
ERYTHROCYTE [DISTWIDTH] IN BLOOD BY AUTOMATED COUNT: 13.2 % (ref 11.5–14.5)
ERYTHROCYTE [SEDIMENTATION RATE] IN BLOOD BY WESTERGREN METHOD: 17 MM/HR (ref 0–20)
EST. GFR  (NO RACE VARIABLE): >60 ML/MIN/1.73 M^2
GLUCOSE SERPL-MCNC: 96 MG/DL (ref 70–110)
HCT VFR BLD AUTO: 39.9 % (ref 37–48.5)
HGB BLD-MCNC: 12.7 G/DL (ref 12–16)
IMM GRANULOCYTES # BLD AUTO: 0.02 K/UL (ref 0–0.04)
IMM GRANULOCYTES NFR BLD AUTO: 0.3 % (ref 0–0.5)
LYMPHOCYTES # BLD AUTO: 3 K/UL (ref 1–4.8)
LYMPHOCYTES NFR BLD: 52 % (ref 18–48)
MCH RBC QN AUTO: 29.7 PG (ref 27–31)
MCHC RBC AUTO-ENTMCNC: 31.8 G/DL (ref 32–36)
MCV RBC AUTO: 93 FL (ref 82–98)
MONOCYTES # BLD AUTO: 0.5 K/UL (ref 0.3–1)
MONOCYTES NFR BLD: 8.2 % (ref 4–15)
NEUTROPHILS # BLD AUTO: 2.1 K/UL (ref 1.8–7.7)
NEUTROPHILS NFR BLD: 36.3 % (ref 38–73)
NRBC BLD-RTO: 0 /100 WBC
PLATELET # BLD AUTO: 291 K/UL (ref 150–450)
PMV BLD AUTO: 10.7 FL (ref 9.2–12.9)
POTASSIUM SERPL-SCNC: 4.3 MMOL/L (ref 3.5–5.1)
PROT SERPL-MCNC: 7.1 G/DL (ref 6–8.4)
RBC # BLD AUTO: 4.27 M/UL (ref 4–5.4)
SODIUM SERPL-SCNC: 140 MMOL/L (ref 136–145)
WBC # BLD AUTO: 5.85 K/UL (ref 3.9–12.7)

## 2024-12-27 PROCEDURE — 85651 RBC SED RATE NONAUTOMATED: CPT | Performed by: PHYSICIAN ASSISTANT

## 2024-12-27 PROCEDURE — 80053 COMPREHEN METABOLIC PANEL: CPT | Performed by: PHYSICIAN ASSISTANT

## 2024-12-27 PROCEDURE — 86140 C-REACTIVE PROTEIN: CPT | Performed by: PHYSICIAN ASSISTANT

## 2024-12-27 PROCEDURE — 36415 COLL VENOUS BLD VENIPUNCTURE: CPT | Performed by: PHYSICIAN ASSISTANT

## 2024-12-27 PROCEDURE — 85025 COMPLETE CBC W/AUTO DIFF WBC: CPT | Performed by: PHYSICIAN ASSISTANT

## 2025-01-02 ENCOUNTER — PATIENT MESSAGE (OUTPATIENT)
Dept: RHEUMATOLOGY | Facility: CLINIC | Age: 55
End: 2025-01-02
Payer: COMMERCIAL

## 2025-01-02 DIAGNOSIS — E66.3 OVERWEIGHT WITH BODY MASS INDEX (BMI) OF 27 TO 27.9 IN ADULT: ICD-10-CM

## 2025-01-02 DIAGNOSIS — L40.50 PSORIATIC ARTHRITIS: ICD-10-CM

## 2025-01-02 DIAGNOSIS — E78.5 HYPERLIPIDEMIA, UNSPECIFIED HYPERLIPIDEMIA TYPE: ICD-10-CM

## 2025-01-03 ENCOUNTER — OFFICE VISIT (OUTPATIENT)
Dept: RHEUMATOLOGY | Facility: CLINIC | Age: 55
End: 2025-01-03
Payer: COMMERCIAL

## 2025-01-03 ENCOUNTER — PATIENT MESSAGE (OUTPATIENT)
Dept: RHEUMATOLOGY | Facility: CLINIC | Age: 55
End: 2025-01-03

## 2025-01-03 DIAGNOSIS — L40.50 PSORIATIC ARTHRITIS: ICD-10-CM

## 2025-01-03 DIAGNOSIS — E66.811 OBESITY (BMI 30.0-34.9): ICD-10-CM

## 2025-01-03 DIAGNOSIS — G89.4 CHRONIC PAIN SYNDROME: ICD-10-CM

## 2025-01-03 DIAGNOSIS — Z79.899 HIGH RISK MEDICATION USE: Primary | ICD-10-CM

## 2025-01-03 PROCEDURE — 1159F MED LIST DOCD IN RCRD: CPT | Mod: CPTII,95,, | Performed by: INTERNAL MEDICINE

## 2025-01-03 PROCEDURE — 1160F RVW MEDS BY RX/DR IN RCRD: CPT | Mod: CPTII,95,, | Performed by: INTERNAL MEDICINE

## 2025-01-03 PROCEDURE — 98006 SYNCH AUDIO-VIDEO EST MOD 30: CPT | Mod: 95,,, | Performed by: INTERNAL MEDICINE

## 2025-01-03 RX ORDER — TIRZEPATIDE 7.5 MG/.5ML
7.5 INJECTION, SOLUTION SUBCUTANEOUS
Qty: 2 ML | Refills: 5 | Status: SHIPPED | OUTPATIENT
Start: 2025-01-03 | End: 2025-07-02

## 2025-01-03 RX ORDER — TIRZEPATIDE 5 MG/.5ML
5 INJECTION, SOLUTION SUBCUTANEOUS
Qty: 2 ML | Refills: 2 | Status: SHIPPED | OUTPATIENT
Start: 2025-01-03 | End: 2025-01-10 | Stop reason: SDUPTHER

## 2025-01-03 RX ORDER — TIRZEPATIDE 2.5 MG/.5ML
2.5 INJECTION, SOLUTION SUBCUTANEOUS
Qty: 2 ML | Refills: 3 | Status: SHIPPED | OUTPATIENT
Start: 2025-01-03 | End: 2025-01-10 | Stop reason: SDUPTHER

## 2025-01-03 RX ORDER — HYDROCODONE BITARTRATE AND ACETAMINOPHEN 7.5; 325 MG/1; MG/1
1 TABLET ORAL EVERY 8 HOURS PRN
Qty: 90 TABLET | Refills: 0 | Status: SHIPPED | OUTPATIENT
Start: 2025-02-03 | End: 2025-03-05

## 2025-01-03 RX ORDER — HYDROCODONE BITARTRATE AND ACETAMINOPHEN 7.5; 325 MG/1; MG/1
1 TABLET ORAL EVERY 8 HOURS PRN
Qty: 90 TABLET | Refills: 0 | Status: SHIPPED | OUTPATIENT
Start: 2025-03-01

## 2025-01-03 RX ORDER — TIRZEPATIDE 10 MG/.5ML
10 INJECTION, SOLUTION SUBCUTANEOUS
Qty: 2 ML | Refills: 5 | Status: SHIPPED | OUTPATIENT
Start: 2025-01-03 | End: 2025-01-10 | Stop reason: SDUPTHER

## 2025-01-03 RX ORDER — TIRZEPATIDE 2.5 MG/.5ML
2.5 INJECTION, SOLUTION SUBCUTANEOUS
Qty: 2 ML | Refills: 3 | Status: SHIPPED | OUTPATIENT
Start: 2025-01-03 | End: 2025-01-03 | Stop reason: SDUPTHER

## 2025-01-03 RX ORDER — HYDROCODONE BITARTRATE AND ACETAMINOPHEN 7.5; 325 MG/1; MG/1
1 TABLET ORAL EVERY 8 HOURS PRN
Qty: 90 TABLET | Refills: 0 | Status: SHIPPED | OUTPATIENT
Start: 2025-01-03 | End: 2025-02-02

## 2025-01-03 NOTE — PROGRESS NOTES
Subjective:     Patient ID:  Osiris Billingsley    Chief Complaint:  Disease Management     History of Present Illness:  Pt is a 54 y.o. female o presents to clinic for follow up on rheumatoid arthritis, psoriatic arthritis, and UC. She is doing fair overall on Humira. She has not been compliant with treatment. She has had a sig amount of weight gain.. She reports pain in her R 1st IP and DIP and 2nd/3rd MCPs on the L hand which is constant and aching.  She reports severe R posterior hip pain that is worse with hip flexion and external rotation.copleted.      She is taking norco per for severe pain. Tizanidine helps with neck/headache pain. Taking ibuprofen PRN for pain.        Current treatment:  1. Humira  2. Norco    Rheumatologic History:   - Diagnosis/es:  - Positive serologies:  - Infectious screening labs:  - Previous Treatments:  - Current Treatments:     Interval History:   Hospitalization since last office visit: No    Patient Active Problem List    Diagnosis Date Noted    Psoriatic arthritis 2019    Ulcerative colitis with complication 2019    Uveitis 2019    Seronegative rheumatoid arthritis 2019     Past Surgical History:   Procedure Laterality Date     SECTION       Social History     Tobacco Use    Smoking status: Never    Smokeless tobacco: Never   Substance Use Topics    Alcohol use: No    Drug use: No     No family history on file.  Review of patient's allergies indicates:   Allergen Reactions    Xeljanz [tofacitinib] Other (See Comments)     Elevated lipids    Decadron [dexamethasone]      Insomnia      Enbrel [etanercept] Hives    Fremanezumab-vfrm     Sulfa (sulfonamide antibiotics)        Review of Systems   Review of Systems   Constitutional:  Negative for activity change, appetite change, chills, diaphoresis, fatigue, fever and unexpected weight change.   HENT:  Negative for congestion, dental problem, ear discharge, ear pain, facial swelling, mouth sores,  nosebleeds, postnasal drip, rhinorrhea, sinus pressure, sneezing, sore throat, tinnitus, trouble swallowing and voice change.    Eyes:  Negative for photophobia, pain, discharge, redness and itching.   Respiratory:  Negative for apnea, cough, chest tightness, shortness of breath and wheezing.    Cardiovascular:  Positive for leg swelling. Negative for chest pain and palpitations.   Gastrointestinal:  Negative for abdominal distention, abdominal pain, constipation, diarrhea, nausea and vomiting.   Endocrine: Negative for cold intolerance, heat intolerance, polydipsia and polyuria.   Genitourinary:  Negative for decreased urine volume, difficulty urinating, dysuria, flank pain, frequency, hematuria and urgency.   Musculoskeletal:  Positive for arthralgias, back pain, gait problem, joint swelling, myalgias, neck pain and neck stiffness.   Skin:  Negative for pallor, rash and wound.   Allergic/Immunologic: Negative for immunocompromised state.   Neurological:  Negative for dizziness, tremors, weakness, numbness and headaches.   Hematological:  Negative for adenopathy. Does not bruise/bleed easily.   Psychiatric/Behavioral:  Negative for sleep disturbance. The patient is not nervous/anxious.         Current Medications:  Current Outpatient Medications   Medication Instructions    acyclovir 5% (ZOVIRAX) 5 % ointment No dose, route, or frequency recorded.    atorvastatin (LIPITOR) 20 mg, Oral, Daily    clonazePAM (KLONOPIN) 0.5 mg, Oral, 2 times daily PRN    EScitalopram oxalate (LEXAPRO) 20 mg, Oral, Daily    famotidine (PEPCID) 20 mg, Oral, 2 times daily    HUMIRA(CF) PEN 40 mg, Subcutaneous, Every 14 days    HYDROcodone-acetaminophen (NORCO) 7.5-325 mg per tablet 1 tablet, Oral, Every 8 hours PRN    HYDROcodone-acetaminophen (NORCO) 7.5-325 mg per tablet 1 tablet, Oral, Every 8 hours PRN    [START ON 3/1/2025] HYDROcodone-acetaminophen (NORCO) 7.5-325 mg per tablet 1 tablet, Oral, Every 8 hours PRN    [START ON  2/3/2025] HYDROcodone-acetaminophen (NORCO) 7.5-325 mg per tablet 1 tablet, Oral, Every 8 hours PRN    HYDROcodone-acetaminophen (NORCO) 7.5-325 mg per tablet 1 tablet, Oral, Every 8 hours PRN    ibuprofen (ADVIL,MOTRIN) 800 mg, Oral, 3 times daily PRN    LIDOcaine-prilocaine (EMLA) cream Topical (Top), As needed (PRN)    omeprazole (PRILOSEC) 40 mg, Oral, Every morning    progesterone (PROMETRIUM) 200 mg, Oral, Nightly    rizatriptan (MAXALT) 10 mg, Oral, As needed (PRN)    sumatriptan (IMITREX) 100 mg, Oral    tiZANidine (ZANAFLEX) 4 mg, Oral, Every 8 hours PRN    traZODone (DESYREL) 100 mg, Oral, Nightly    valACYclovir (VALTREX) 1,000 mg, Oral, Every 12 hours    VYVANSE 40 mg, Oral, Daily    ZEPBOUND 7.5 mg, Subcutaneous, Every 7 days    ZEPBOUND 10 mg, Subcutaneous, Every 7 days    ZEPBOUND 2.5 mg, Subcutaneous, Every 7 days    ZEPBOUND 5 mg, Subcutaneous, Every 7 days    ZEPBOUND 7.5 mg, Subcutaneous, Every 7 days         Objective:     There were no vitals filed for this visit.   There is no height or weight on file to calculate BMI.     Physical Examinations:  Physical Exam   Constitutional: She is oriented to person, place, and time.   Neurological: She is alert and oriented to person, place, and time.   Psychiatric: Mood, affect and judgment normal.        Disease Assessment Scores:  Patient's Global Assessment of arthritis (0-10): 2  Physician's Global Assessment of arthritis (0-10): 1  Number of Tender Joints (0-28): 2  Number of Swollen Joints (0-28): 1        5/3/2024    10:32 AM   Rapid3 Question Responses and Scores   MDHAQ Score 1   Psychologic Score 5.5   Pain Score 7   When you awakened in the morning OVER THE LAST WEEK, did you feel stiff? Yes   If Yes, please indicate the number of hours until you are as limber as you will be for the day 2   Fatigue Score 10   Global Health Score 8   RAPID3 Score 6.11       Monitoring Lab Results:  Lab Results   Component Value Date    WBC 5.85 12/27/2024    RBC  "4.27 12/27/2024    HGB 12.7 12/27/2024    HCT 39.9 12/27/2024    MCV 93 12/27/2024    MCH 29.7 12/27/2024    MCHC 31.8 (L) 12/27/2024    RDW 13.2 12/27/2024     12/27/2024        Lab Results   Component Value Date     12/27/2024    K 4.3 12/27/2024     12/27/2024    CO2 28 12/27/2024    GLU 96 12/27/2024    BUN 13 12/27/2024    CREATININE 0.8 12/27/2024    CALCIUM 9.4 12/27/2024    PROT 7.1 12/27/2024    ALBUMIN 3.9 12/27/2024    BILITOT 0.5 12/27/2024    ALKPHOS 53 12/27/2024    AST 14 12/27/2024    ALT 15 12/27/2024    ANIONGAP 6 (L) 12/27/2024    EGFRNORACEVR >60 12/27/2024       Lab Results   Component Value Date    SEDRATE 17 12/27/2024    CRP 0.4 12/27/2024        Lab Results   Component Value Date    EPYXWHOK14NG 38 06/05/2023    VTXIHKWC98 588 05/01/2015        Lab Results   Component Value Date    CHOL 329 (H) 08/12/2024    HDL 56 08/12/2024    LDLCALC 214.0 (H) 08/12/2024    TRIG 295 (H) 08/12/2024       Lab Results   Component Value Date    RF <13.0 06/05/2023    CCPANTIBODIE <0.5 06/05/2023     Lab Results   Component Value Date    ANASCREEN Negative <1:80 06/05/2023    DSDNA Negative 1:10 07/14/2017     Lab Results   Component Value Date    HLABB27 Negative 01/09/2024       Infectious Disease Screening:  Lab Results   Component Value Date    HEPBSAG Non-reactive 10/05/2023    HEPBCAB Non-reactive 10/05/2023    HEPBIGM Negative 06/05/2018     Lab Results   Component Value Date    HEPCAB Non-reactive 10/05/2023     Lab Results   Component Value Date    TBGOLDPLUS Negative 10/05/2023     No results found for: "QUANTIFERON", "SVCMT", "QUANTAGVALUE", "QUANTNILVALU", "QUANTMITOGEN", "QFTTBAG", "QINT"     Imaging: DEXA, Xrays, MRIs, CTs, etc    Old & Outside Medical Records:  Reviewed old and all outside medical records available in Care Everywhere     Assessment:     Encounter Diagnoses   Name Primary?    High risk medication use Yes    Psoriatic arthritis     Obesity (BMI 30.0-34.9)     " Chronic pain syndrome         Plan:      Encounter Diagnoses   Name Primary?    High risk medication use Yes    Psoriatic arthritis     Obesity (BMI 30.0-34.9)     Chronic pain syndrome    High risk medication use    Psoriatic arthritis  -     HYDROcodone-acetaminophen (NORCO) 7.5-325 mg per tablet; Take 1 tablet by mouth every 8 (eight) hours as needed for Pain.  Dispense: 90 tablet; Refill: 0  -     HYDROcodone-acetaminophen (NORCO) 7.5-325 mg per tablet; Take 1 tablet by mouth every 8 (eight) hours as needed for Pain.  Dispense: 90 tablet; Refill: 0  -     HYDROcodone-acetaminophen (NORCO) 7.5-325 mg per tablet; Take 1 tablet by mouth every 8 (eight) hours as needed for Pain.  Dispense: 90 tablet; Refill: 0    Obesity (BMI 30.0-34.9)  -     Discontinue: tirzepatide, weight loss, (ZEPBOUND) 2.5 mg/0.5 mL PnIj; Inject 2.5 mg into the skin every 7 days.  Dispense: 2 mL; Refill: 3  -     Discontinue: tirzepatide, weight loss, (ZEPBOUND) 5 mg/0.5 mL PnIj; Inject 5 mg into the skin every 7 days.  Dispense: 2 mL; Refill: 2  -     tirzepatide, weight loss, (ZEPBOUND) 7.5 mg/0.5 mL PnIj; Inject 7.5 mg into the skin every 7 days.  Dispense: 2 mL; Refill: 5  -     Discontinue: tirzepatide, weight loss, (ZEPBOUND) 10 mg/0.5 mL PnIj; Inject 10 mg into the skin every 7 days.  Dispense: 2 mL; Refill: 5  -     Discontinue: tirzepatide, weight loss, (ZEPBOUND) 2.5 mg/0.5 mL PnIj; Inject 2.5 mg into the skin every 7 days.  Dispense: 2 mL; Refill: 3    Chronic pain syndrome  -     HYDROcodone-acetaminophen (NORCO) 7.5-325 mg per tablet; Take 1 tablet by mouth every 8 (eight) hours as needed for Pain.  Dispense: 90 tablet; Refill: 0  -     HYDROcodone-acetaminophen (NORCO) 7.5-325 mg per tablet; Take 1 tablet by mouth every 8 (eight) hours as needed for Pain.  Dispense: 90 tablet; Refill: 0  -     HYDROcodone-acetaminophen (NORCO) 7.5-325 mg per tablet; Take 1 tablet by mouth every 8 (eight) hours as needed for Pain.  Dispense: 90  tablet; Refill: 0          1. Norco refill  2. Continue Humira  3. F/u 4 months     Follow-up 4 months    The patient location is: home  The chief complaint leading to consultation is: psa    Visit type: audiovisual    Face to Face time with patient: 30   minutes of total time spent on the encounter, which includes face to face time and non-face to face time preparing to see the patient (eg, review of tests), Obtaining and/or reviewing separately obtained history, Documenting clinical information in the electronic or other health record, Independently interpreting results (not separately reported) and communicating results to the patient/family/caregiver, or Care coordination (not separately reported).         Each patient to whom he or she provides medical services by telemedicine is:  (1) informed of the relationship between the physician and patient and the respective role of any other health care provider with respect to management of the patient; and (2) notified that he or she may decline to receive medical services by telemedicine and may withdraw from such care at any time.    Notes:

## 2025-01-04 ENCOUNTER — PATIENT MESSAGE (OUTPATIENT)
Dept: RHEUMATOLOGY | Facility: CLINIC | Age: 55
End: 2025-01-04
Payer: COMMERCIAL

## 2025-01-08 RX ORDER — TIRZEPATIDE 7.5 MG/.5ML
7.5 INJECTION, SOLUTION SUBCUTANEOUS
Qty: 12 PEN | Refills: 5 | Status: SHIPPED | OUTPATIENT
Start: 2025-01-08 | End: 2025-01-10 | Stop reason: SDUPTHER

## 2025-01-09 ENCOUNTER — PATIENT MESSAGE (OUTPATIENT)
Dept: RHEUMATOLOGY | Facility: CLINIC | Age: 55
End: 2025-01-09
Payer: COMMERCIAL

## 2025-01-09 DIAGNOSIS — E78.5 HYPERLIPIDEMIA, UNSPECIFIED HYPERLIPIDEMIA TYPE: ICD-10-CM

## 2025-01-09 DIAGNOSIS — L40.50 PSORIATIC ARTHRITIS: ICD-10-CM

## 2025-01-09 DIAGNOSIS — E66.811 OBESITY (BMI 30.0-34.9): ICD-10-CM

## 2025-01-09 DIAGNOSIS — E66.3 OVERWEIGHT WITH BODY MASS INDEX (BMI) OF 27 TO 27.9 IN ADULT: ICD-10-CM

## 2025-01-10 RX ORDER — TIRZEPATIDE 7.5 MG/.5ML
7.5 INJECTION, SOLUTION SUBCUTANEOUS
Qty: 12 PEN | Refills: 5 | Status: SHIPPED | OUTPATIENT
Start: 2025-01-10

## 2025-01-10 RX ORDER — TIRZEPATIDE 5 MG/.5ML
5 INJECTION, SOLUTION SUBCUTANEOUS
Qty: 2 ML | Refills: 2 | Status: SHIPPED | OUTPATIENT
Start: 2025-01-10 | End: 2025-04-10

## 2025-01-10 RX ORDER — TIRZEPATIDE 10 MG/.5ML
10 INJECTION, SOLUTION SUBCUTANEOUS
Qty: 2 ML | Refills: 5 | Status: SHIPPED | OUTPATIENT
Start: 2025-01-10 | End: 2025-07-09

## 2025-01-10 RX ORDER — TIRZEPATIDE 2.5 MG/.5ML
2.5 INJECTION, SOLUTION SUBCUTANEOUS
Qty: 2 ML | Refills: 3 | Status: SHIPPED | OUTPATIENT
Start: 2025-01-10 | End: 2025-05-02

## 2025-01-23 DIAGNOSIS — G89.4 CHRONIC PAIN SYNDROME: ICD-10-CM

## 2025-01-23 DIAGNOSIS — L40.50 PSORIATIC ARTHRITIS: ICD-10-CM

## 2025-01-27 RX ORDER — HYDROCODONE BITARTRATE AND ACETAMINOPHEN 7.5; 325 MG/1; MG/1
1 TABLET ORAL EVERY 8 HOURS PRN
Qty: 90 TABLET | Refills: 0 | OUTPATIENT
Start: 2025-01-27

## 2025-01-31 DIAGNOSIS — E66.811 OBESITY (BMI 30.0-34.9): ICD-10-CM

## 2025-02-04 RX ORDER — TIRZEPATIDE 2.5 MG/.5ML
2.5 INJECTION, SOLUTION SUBCUTANEOUS
Qty: 2 ML | Refills: 3 | Status: SHIPPED | OUTPATIENT
Start: 2025-02-04 | End: 2025-02-06

## 2025-02-06 ENCOUNTER — PATIENT MESSAGE (OUTPATIENT)
Dept: RHEUMATOLOGY | Facility: CLINIC | Age: 55
End: 2025-02-06
Payer: COMMERCIAL

## 2025-02-06 DIAGNOSIS — E66.811 OBESITY (BMI 30.0-34.9): Primary | ICD-10-CM

## 2025-02-06 RX ORDER — TIRZEPATIDE 5 MG/.5ML
5 INJECTION, SOLUTION SUBCUTANEOUS
Qty: 2 ML | Refills: 3 | Status: SHIPPED | OUTPATIENT
Start: 2025-02-06

## 2025-02-06 RX ORDER — TIRZEPATIDE 2.5 MG/.5ML
2.5 INJECTION, SOLUTION SUBCUTANEOUS
Qty: 2 ML | Refills: 3 | Status: SHIPPED | OUTPATIENT
Start: 2025-02-06

## 2025-02-21 ENCOUNTER — PATIENT MESSAGE (OUTPATIENT)
Dept: RHEUMATOLOGY | Facility: CLINIC | Age: 55
End: 2025-02-21
Payer: COMMERCIAL

## 2025-03-10 ENCOUNTER — PATIENT MESSAGE (OUTPATIENT)
Dept: RHEUMATOLOGY | Facility: CLINIC | Age: 55
End: 2025-03-10
Payer: COMMERCIAL

## 2025-03-17 NOTE — TELEPHONE ENCOUNTER
Pharmacy requesting refill on Linzess  Pt's LOV 01/03/2025  Pt's NOV 09/08/2025  Medication pending

## 2025-04-08 ENCOUNTER — PATIENT MESSAGE (OUTPATIENT)
Dept: RHEUMATOLOGY | Facility: CLINIC | Age: 55
End: 2025-04-08
Payer: COMMERCIAL

## 2025-04-09 DIAGNOSIS — L40.50 PSORIATIC ARTHRITIS: ICD-10-CM

## 2025-04-09 DIAGNOSIS — M06.00 SERONEGATIVE RHEUMATOID ARTHRITIS: ICD-10-CM

## 2025-04-09 DIAGNOSIS — K51.919 ULCERATIVE COLITIS WITH COMPLICATION, UNSPECIFIED LOCATION: ICD-10-CM

## 2025-04-09 DIAGNOSIS — M47.817 LUMBAR AND SACRAL OSTEOARTHRITIS: ICD-10-CM

## 2025-04-10 RX ORDER — CLONAZEPAM 0.5 MG/1
0.5 TABLET ORAL 2 TIMES DAILY PRN
Qty: 60 TABLET | Refills: 3 | Status: SHIPPED | OUTPATIENT
Start: 2025-04-10

## 2025-04-17 ENCOUNTER — PATIENT MESSAGE (OUTPATIENT)
Dept: RHEUMATOLOGY | Facility: CLINIC | Age: 55
End: 2025-04-17
Payer: COMMERCIAL

## 2025-04-23 ENCOUNTER — PATIENT MESSAGE (OUTPATIENT)
Dept: RHEUMATOLOGY | Facility: CLINIC | Age: 55
End: 2025-04-23
Payer: COMMERCIAL

## 2025-04-23 DIAGNOSIS — B02.9 HERPES ZOSTER WITHOUT COMPLICATION: ICD-10-CM

## 2025-04-23 DIAGNOSIS — G89.4 CHRONIC PAIN SYNDROME: ICD-10-CM

## 2025-04-23 DIAGNOSIS — L40.50 PSORIATIC ARTHRITIS: ICD-10-CM

## 2025-04-23 RX ORDER — VALACYCLOVIR HYDROCHLORIDE 1 G/1
1000 TABLET, FILM COATED ORAL EVERY 12 HOURS
Qty: 60 TABLET | Refills: 1 | Status: CANCELLED | OUTPATIENT
Start: 2025-04-23

## 2025-04-23 NOTE — TELEPHONE ENCOUNTER
Pharmacy requesting refill on Valacyclovir 1gm  Pt's LOV 01/03/2025  Pt's NOV 09/08/2025  Medication pending

## 2025-04-24 RX ORDER — HYDROCODONE BITARTRATE AND ACETAMINOPHEN 7.5; 325 MG/1; MG/1
1 TABLET ORAL EVERY 8 HOURS PRN
Qty: 90 TABLET | Refills: 0 | Status: SHIPPED | OUTPATIENT
Start: 2025-04-24

## 2025-04-24 RX ORDER — VALACYCLOVIR HYDROCHLORIDE 1 G/1
TABLET, FILM COATED ORAL
Qty: 42 TABLET | Refills: 1 | Status: SHIPPED | OUTPATIENT
Start: 2025-04-24

## 2025-06-26 ENCOUNTER — PATIENT MESSAGE (OUTPATIENT)
Dept: RHEUMATOLOGY | Facility: CLINIC | Age: 55
End: 2025-06-26
Payer: COMMERCIAL

## 2025-07-17 DIAGNOSIS — G89.4 CHRONIC PAIN SYNDROME: ICD-10-CM

## 2025-07-17 DIAGNOSIS — L40.50 PSORIATIC ARTHRITIS: ICD-10-CM

## 2025-07-17 DIAGNOSIS — B02.9 HERPES ZOSTER WITHOUT COMPLICATION: ICD-10-CM

## 2025-07-21 RX ORDER — VALACYCLOVIR HYDROCHLORIDE 1 G/1
TABLET, FILM COATED ORAL
Qty: 42 TABLET | Refills: 1 | Status: SHIPPED | OUTPATIENT
Start: 2025-07-21

## 2025-07-21 RX ORDER — HYDROCODONE BITARTRATE AND ACETAMINOPHEN 7.5; 325 MG/1; MG/1
1 TABLET ORAL EVERY 8 HOURS PRN
Qty: 90 TABLET | Refills: 0 | Status: SHIPPED | OUTPATIENT
Start: 2025-07-21

## 2025-08-17 ENCOUNTER — PATIENT MESSAGE (OUTPATIENT)
Dept: RHEUMATOLOGY | Facility: CLINIC | Age: 55
End: 2025-08-17
Payer: COMMERCIAL

## 2025-08-18 ENCOUNTER — HOSPITAL ENCOUNTER (OUTPATIENT)
Dept: RADIOLOGY | Facility: HOSPITAL | Age: 55
Discharge: HOME | End: 2025-08-18
Payer: COMMERCIAL

## 2025-08-18 ENCOUNTER — OFFICE VISIT (OUTPATIENT)
Dept: ORTHOPEDICS | Facility: CLINIC | Age: 55
End: 2025-08-18
Payer: COMMERCIAL

## 2025-08-18 VITALS — BODY MASS INDEX: 25.21 KG/M2 | WEIGHT: 137 LBS | HEIGHT: 62 IN

## 2025-08-18 DIAGNOSIS — M65.311 TRIGGER FINGER OF RIGHT THUMB: Primary | ICD-10-CM

## 2025-08-18 PROCEDURE — 1160F RVW MEDS BY RX/DR IN RCRD: CPT | Mod: CPTII,S$GLB,,

## 2025-08-18 PROCEDURE — 1159F MED LIST DOCD IN RCRD: CPT | Mod: CPTII,S$GLB,,

## 2025-08-18 PROCEDURE — 3008F BODY MASS INDEX DOCD: CPT | Mod: CPTII,S$GLB,,

## 2025-08-18 PROCEDURE — 73130 X-RAY EXAM OF HAND: CPT | Mod: 26,RT,, | Performed by: RADIOLOGY

## 2025-08-18 PROCEDURE — 73130 X-RAY EXAM OF HAND: CPT | Mod: TC,PN,RT

## 2025-08-18 PROCEDURE — 99203 OFFICE O/P NEW LOW 30 MIN: CPT | Mod: 25,S$GLB,,

## 2025-08-18 PROCEDURE — 99999 PR PBB SHADOW E&M-EST. PATIENT-LVL IV: CPT | Mod: PBBFAC,,,

## 2025-08-18 PROCEDURE — 20550 NJX 1 TENDON SHEATH/LIGAMENT: CPT | Mod: RT,S$GLB,,

## 2025-08-18 RX ORDER — TRIAMCINOLONE ACETONIDE 40 MG/ML
40 INJECTION, SUSPENSION INTRA-ARTICULAR; INTRAMUSCULAR
Status: DISCONTINUED | OUTPATIENT
Start: 2025-08-18 | End: 2025-08-18 | Stop reason: HOSPADM

## 2025-08-18 RX ADMIN — TRIAMCINOLONE ACETONIDE 40 MG: 40 INJECTION, SUSPENSION INTRA-ARTICULAR; INTRAMUSCULAR at 02:08

## 2025-08-24 ENCOUNTER — PATIENT MESSAGE (OUTPATIENT)
Dept: RHEUMATOLOGY | Facility: CLINIC | Age: 55
End: 2025-08-24
Payer: COMMERCIAL

## 2025-08-25 DIAGNOSIS — M06.00 SERONEGATIVE RHEUMATOID ARTHRITIS: ICD-10-CM

## 2025-08-25 DIAGNOSIS — F32.9 MAJOR DEPRESSION, CHRONIC: ICD-10-CM

## 2025-08-25 DIAGNOSIS — L40.50 PSORIATIC ARTHRITIS: Primary | ICD-10-CM

## 2025-08-25 RX ORDER — ESCITALOPRAM OXALATE 20 MG/1
20 TABLET ORAL DAILY
Qty: 90 TABLET | Refills: 3 | Status: SHIPPED | OUTPATIENT
Start: 2025-08-25 | End: 2026-08-25

## 2025-08-27 ENCOUNTER — LAB VISIT (OUTPATIENT)
Dept: LAB | Facility: HOSPITAL | Age: 55
End: 2025-08-27
Attending: INTERNAL MEDICINE
Payer: COMMERCIAL

## 2025-08-27 DIAGNOSIS — M06.00 SERONEGATIVE RHEUMATOID ARTHRITIS: ICD-10-CM

## 2025-08-27 DIAGNOSIS — L40.50 PSORIATIC ARTHRITIS: ICD-10-CM

## 2025-08-27 LAB
ABSOLUTE EOSINOPHIL (OHS): 0.21 K/UL
ABSOLUTE MONOCYTE (OHS): 0.43 K/UL (ref 0.3–1)
ABSOLUTE NEUTROPHIL COUNT (OHS): 2.54 K/UL (ref 1.8–7.7)
ALBUMIN SERPL BCP-MCNC: 4.4 G/DL (ref 3.5–5.2)
ALP SERPL-CCNC: 74 UNIT/L (ref 40–150)
ALT SERPL W/O P-5'-P-CCNC: 18 UNIT/L (ref 0–55)
ANION GAP (OHS): 9 MMOL/L (ref 8–16)
AST SERPL-CCNC: 18 UNIT/L (ref 0–50)
BASOPHILS # BLD AUTO: 0.03 K/UL
BASOPHILS NFR BLD AUTO: 0.5 %
BILIRUB SERPL-MCNC: 1 MG/DL (ref 0.1–1)
BUN SERPL-MCNC: 18 MG/DL (ref 6–20)
CALCIUM SERPL-MCNC: 9.5 MG/DL (ref 8.7–10.5)
CHLORIDE SERPL-SCNC: 104 MMOL/L (ref 95–110)
CO2 SERPL-SCNC: 25 MMOL/L (ref 23–29)
CREAT SERPL-MCNC: 0.9 MG/DL (ref 0.5–1.4)
CRP SERPL-MCNC: 0.6 MG/L
ERYTHROCYTE [DISTWIDTH] IN BLOOD BY AUTOMATED COUNT: 13.2 % (ref 11.5–14.5)
ERYTHROCYTE [SEDIMENTATION RATE] IN BLOOD: 13 MM/HR
GFR SERPLBLD CREATININE-BSD FMLA CKD-EPI: >60 ML/MIN/1.73/M2
GLUCOSE SERPL-MCNC: 80 MG/DL (ref 70–110)
HCT VFR BLD AUTO: 41.6 % (ref 37–48.5)
HGB BLD-MCNC: 13.5 GM/DL (ref 12–16)
IMM GRANULOCYTES # BLD AUTO: 0.02 K/UL (ref 0–0.04)
IMM GRANULOCYTES NFR BLD AUTO: 0.3 % (ref 0–0.5)
LYMPHOCYTES # BLD AUTO: 2.67 K/UL (ref 1–4.8)
MCH RBC QN AUTO: 29.9 PG (ref 27–31)
MCHC RBC AUTO-ENTMCNC: 32.5 G/DL (ref 32–36)
MCV RBC AUTO: 92 FL (ref 82–98)
NUCLEATED RBC (/100WBC) (OHS): 0 /100 WBC
PLATELET # BLD AUTO: 291 K/UL (ref 150–450)
PMV BLD AUTO: 10.8 FL (ref 9.2–12.9)
POTASSIUM SERPL-SCNC: 4.1 MMOL/L (ref 3.5–5.1)
PROT SERPL-MCNC: 7.8 GM/DL (ref 6–8.4)
RBC # BLD AUTO: 4.51 M/UL (ref 4–5.4)
RELATIVE EOSINOPHIL (OHS): 3.6 %
RELATIVE LYMPHOCYTE (OHS): 45.3 % (ref 18–48)
RELATIVE MONOCYTE (OHS): 7.3 % (ref 4–15)
RELATIVE NEUTROPHIL (OHS): 43 % (ref 38–73)
SODIUM SERPL-SCNC: 138 MMOL/L (ref 136–145)
WBC # BLD AUTO: 5.9 K/UL (ref 3.9–12.7)

## 2025-08-27 PROCEDURE — 36415 COLL VENOUS BLD VENIPUNCTURE: CPT | Mod: PO

## 2025-08-27 PROCEDURE — 86140 C-REACTIVE PROTEIN: CPT

## 2025-08-27 PROCEDURE — 85651 RBC SED RATE NONAUTOMATED: CPT | Mod: PO

## 2025-08-27 PROCEDURE — 80053 COMPREHEN METABOLIC PANEL: CPT

## 2025-08-27 PROCEDURE — 85025 COMPLETE CBC W/AUTO DIFF WBC: CPT
